# Patient Record
Sex: FEMALE | Race: WHITE | Employment: FULL TIME | URBAN - METROPOLITAN AREA
[De-identification: names, ages, dates, MRNs, and addresses within clinical notes are randomized per-mention and may not be internally consistent; named-entity substitution may affect disease eponyms.]

---

## 2017-05-04 DIAGNOSIS — E03.9 HYPOTHYROIDISM, UNSPECIFIED TYPE: Primary | ICD-10-CM

## 2017-05-04 RX ORDER — LEVOTHYROXINE SODIUM 0.12 MG/1
TABLET ORAL
Qty: 30 TABLET | Refills: 2 | Status: SHIPPED | OUTPATIENT
Start: 2017-05-04 | End: 2017-06-14 | Stop reason: ALTCHOICE

## 2017-06-14 ENCOUNTER — OFFICE VISIT (OUTPATIENT)
Dept: INTERNAL MEDICINE CLINIC | Facility: CLINIC | Age: 62
End: 2017-06-14

## 2017-06-14 VITALS
OXYGEN SATURATION: 96 % | BODY MASS INDEX: 37.11 KG/M2 | WEIGHT: 189 LBS | SYSTOLIC BLOOD PRESSURE: 124 MMHG | HEIGHT: 60 IN | RESPIRATION RATE: 17 BRPM | DIASTOLIC BLOOD PRESSURE: 86 MMHG | HEART RATE: 81 BPM

## 2017-06-14 DIAGNOSIS — Z72.0 TOBACCO USE: ICD-10-CM

## 2017-06-14 DIAGNOSIS — E03.9 HYPOTHYROIDISM, UNSPECIFIED TYPE: ICD-10-CM

## 2017-06-14 DIAGNOSIS — C50.911 MALIGNANT NEOPLASM OF RIGHT FEMALE BREAST, UNSPECIFIED ESTROGEN RECEPTOR STATUS, UNSPECIFIED SITE OF BREAST (HCC): Primary | ICD-10-CM

## 2017-06-14 DIAGNOSIS — R07.81 RIB PAIN ON RIGHT SIDE: ICD-10-CM

## 2017-06-14 DIAGNOSIS — G89.29 CHRONIC BILATERAL LOW BACK PAIN WITHOUT SCIATICA: ICD-10-CM

## 2017-06-14 DIAGNOSIS — E78.5 HYPERLIPIDEMIA, UNSPECIFIED HYPERLIPIDEMIA TYPE: ICD-10-CM

## 2017-06-14 DIAGNOSIS — M54.50 CHRONIC BILATERAL LOW BACK PAIN WITHOUT SCIATICA: ICD-10-CM

## 2017-06-14 PROCEDURE — 99214 OFFICE O/P EST MOD 30 MIN: CPT | Performed by: FAMILY MEDICINE

## 2017-06-14 PROCEDURE — 80061 LIPID PANEL: CPT | Performed by: FAMILY MEDICINE

## 2017-06-14 PROCEDURE — 81015 MICROSCOPIC EXAM OF URINE: CPT | Performed by: FAMILY MEDICINE

## 2017-06-14 PROCEDURE — 80050 GENERAL HEALTH PANEL: CPT | Performed by: FAMILY MEDICINE

## 2017-06-14 NOTE — PATIENT INSTRUCTIONS
Please make appt with oncology- Dr Gerber Adams or Dr Denisa Sethi [facility])    Suspect these pains are muscular and arthritis of lower spine, but want you to please start with xrays/ mammogram and then I might order CT chest    Please also make appt for full physical

## 2017-06-14 NOTE — PROGRESS NOTES
Yusuf Acosta is a 63year old female. CC:  Patient presents with:  Lump: Pt presents to clinic for c/o lump on right side of body near ribcage x 4wks. Low Back Pain: Pt also c/o ongoing low back pain->currently 3/10/      HPI:    1.  Lump R side ches artery stenosis      R , s/p radiation   • History of blood transfusion 2005   • Biliary calculus 1978     Removed gallbladder   • Cirrhosis of liver ([facility] [address] 2013   • Stroke [facility]) 2010 and 2014     TIA   • Disorder of liver    • Obesity    • Thyroid disease lesions  EXTREMITIES:  Bilaterally warm, no edema, no rashes  LYMPH:  No cervical lymphadenopathy  MUSCULOSKELETAL: No abnormalities on inspection. No spinal tenderness. +ttp over lumbar muscles on left and right. No tenderness over SI joint.  Negative str Future  - Comp Metabolic Panel (14) [E]  - Lipid Panel [E]    Pt to make appt for full PE and pap   There are no diagnoses linked to this encounter. No orders of the defined types were placed in this encounter.      NAKIA SCREENING BILAT (CPT=77067)

## 2017-06-19 NOTE — PROGRESS NOTES
Quick Note:    D/w pt  Liver enyzmes stable- hx cirrhosis s/p chemo  LDL not at goal- pt admits not taking statin regularly. Has hx TIA and carotid stenosis, LDL goal 70. Pt will take daily now and recheck in 2 mos.  Refill of atorvastatin 40 sent  ______

## 2017-07-18 ENCOUNTER — TELEPHONE (OUTPATIENT)
Dept: INTERNAL MEDICINE CLINIC | Facility: CLINIC | Age: 62
End: 2017-07-18

## 2017-07-19 ENCOUNTER — HOSPITAL ENCOUNTER (OUTPATIENT)
Dept: GENERAL RADIOLOGY | Age: 62
Discharge: HOME | End: 2017-07-19
Attending: FAMILY MEDICINE
Payer: COMMERCIAL

## 2017-07-19 DIAGNOSIS — R07.81 RIB PAIN ON RIGHT SIDE: ICD-10-CM

## 2017-07-19 DIAGNOSIS — M54.50 CHRONIC BILATERAL LOW BACK PAIN WITHOUT SCIATICA: ICD-10-CM

## 2017-07-19 DIAGNOSIS — G89.29 CHRONIC BILATERAL LOW BACK PAIN WITHOUT SCIATICA: ICD-10-CM

## 2017-07-19 PROCEDURE — 71101 X-RAY EXAM UNILAT RIBS/CHEST: CPT | Performed by: FAMILY MEDICINE

## 2017-07-19 PROCEDURE — 72110 X-RAY EXAM L-2 SPINE 4/>VWS: CPT | Performed by: FAMILY MEDICINE

## 2017-07-20 NOTE — PROGRESS NOTES
Pt also working on making appt with oncology. Pt also having various new pains- collar bone, low back, R side, neck. Asked to please make appt to go over new pains.  Would still recommend CT

## 2017-08-05 DIAGNOSIS — E03.9 HYPOTHYROIDISM, UNSPECIFIED TYPE: ICD-10-CM

## 2017-08-05 RX ORDER — LEVOTHYROXINE SODIUM 0.12 MG/1
TABLET ORAL
Qty: 30 TABLET | Refills: 2 | Status: SHIPPED | OUTPATIENT
Start: 2017-08-05 | End: 2017-11-02

## 2017-08-10 ENCOUNTER — TELEPHONE (OUTPATIENT)
Dept: HEMATOLOGY/ONCOLOGY | Facility: HOSPITAL | Age: 62
End: 2017-08-10

## 2017-08-17 ENCOUNTER — TELEPHONE (OUTPATIENT)
Dept: HEMATOLOGY/ONCOLOGY | Facility: HOSPITAL | Age: 62
End: 2017-08-17

## 2017-08-17 ENCOUNTER — TELEPHONE (OUTPATIENT)
Dept: INTERNAL MEDICINE CLINIC | Facility: CLINIC | Age: 62
End: 2017-08-17

## 2017-08-17 NOTE — TELEPHONE ENCOUNTER
Spoke to Pancho Rea at Dr. Will Frost' office and informed her that we have made multiple attempts (7/11 - initial intake with patient stating she would drop off records, 8/1, 8/10, 8/16) and left messages for the patient to schedule her consult with Dr. Tyler Gauthier.   We hav

## 2017-08-17 NOTE — TELEPHONE ENCOUNTER
The [facility] has been trying to reach the patient to set up an appt without success. They will no longer be trying to reach the patient.

## 2017-08-17 NOTE — TELEPHONE ENCOUNTER
Pt's  verified  \Called Pt and n/a l/m to reach out to Cancer center to schedule her appt with them and to call our office if she has any questions

## 2017-08-17 NOTE — TELEPHONE ENCOUNTER
Can you please call pt and tell her cancer center has been trying to reach her. I would really like her to get seen there so ask her to please call back and sched appt. Thank you!

## 2017-09-12 ENCOUNTER — TELEPHONE (OUTPATIENT)
Dept: HEMATOLOGY/ONCOLOGY | Facility: HOSPITAL | Age: 62
End: 2017-09-12

## 2017-09-12 NOTE — TELEPHONE ENCOUNTER
I attempted to reach the patient today. I left a voice mail message asking her to please call me back. I need to speak with her regarding her history of right breast cancer. I need specifics so we may obtain her records.  We will need her records/imaging be

## 2017-09-20 ENCOUNTER — TELEPHONE (OUTPATIENT)
Dept: HEMATOLOGY/ONCOLOGY | Facility: HOSPITAL | Age: 62
End: 2017-09-20

## 2017-09-20 NOTE — TELEPHONE ENCOUNTER
I attempted to reach [facility] regarding scheduling a consult appt with Dr Matt Glass. I left a  msg asking her to please call me back. I am holding an appt on 9/29/2017 at 1pm on Dr Santiago Seals schedule.

## 2017-09-21 ENCOUNTER — HOSPITAL ENCOUNTER (OUTPATIENT)
Dept: CT IMAGING | Facility: HOSPITAL | Age: 62
Discharge: HOME | End: 2017-09-21
Attending: FAMILY MEDICINE
Payer: COMMERCIAL

## 2017-09-21 ENCOUNTER — APPOINTMENT (OUTPATIENT)
Dept: MAMMOGRAPHY | Age: 62
End: 2017-09-21
Attending: FAMILY MEDICINE
Payer: COMMERCIAL

## 2017-09-21 DIAGNOSIS — M54.6 ACUTE RIGHT-SIDED THORACIC BACK PAIN: ICD-10-CM

## 2017-09-21 PROBLEM — K74.69 OTHER CIRRHOSIS OF LIVER (HCC): Status: ACTIVE | Noted: 2017-09-21

## 2017-09-21 LAB — CREAT BLD-MCNC: 0.7 MG/DL (ref 0.5–1.5)

## 2017-09-21 PROCEDURE — 74177 CT ABD & PELVIS W/CONTRAST: CPT | Performed by: FAMILY MEDICINE

## 2017-09-21 PROCEDURE — 82565 ASSAY OF CREATININE: CPT

## 2017-09-21 PROCEDURE — 71260 CT THORAX DX C+: CPT | Performed by: FAMILY MEDICINE

## 2017-09-22 NOTE — PROGRESS NOTES
Henrique Mccabe,    I just left you a voicemail. CT is overall stable with no signs of metastatic disease which is good. Please 1. Make appt with oncology as you should be seeing them yearly. 2. Please make appt at my office for fasting blood draw.  You are due

## 2017-09-28 ENCOUNTER — HOSPITAL ENCOUNTER (OUTPATIENT)
Dept: MAMMOGRAPHY | Age: 62
Discharge: HOME | End: 2017-09-28
Attending: FAMILY MEDICINE
Payer: COMMERCIAL

## 2017-09-28 DIAGNOSIS — C50.911 MALIGNANT NEOPLASM OF RIGHT FEMALE BREAST, UNSPECIFIED ESTROGEN RECEPTOR STATUS, UNSPECIFIED SITE OF BREAST (HCC): ICD-10-CM

## 2017-09-28 PROCEDURE — 77067 SCR MAMMO BI INCL CAD: CPT | Performed by: FAMILY MEDICINE

## 2017-10-12 PROBLEM — Z17.0 MALIGNANT NEOPLASM OF RIGHT BREAST IN FEMALE, ESTROGEN RECEPTOR POSITIVE  (HCC): Status: ACTIVE | Noted: 2017-10-12

## 2017-10-12 PROBLEM — Z17.0 MALIGNANT NEOPLASM OF RIGHT BREAST IN FEMALE, ESTROGEN RECEPTOR POSITIVE: Status: ACTIVE | Noted: 2017-10-12

## 2017-10-12 PROBLEM — C50.911 MALIGNANT NEOPLASM OF RIGHT BREAST IN FEMALE, ESTROGEN RECEPTOR POSITIVE (HCC): Status: ACTIVE | Noted: 2017-10-12

## 2017-10-12 PROBLEM — C50.911 MALIGNANT NEOPLASM OF RIGHT BREAST IN FEMALE, ESTROGEN RECEPTOR POSITIVE: Status: ACTIVE | Noted: 2017-10-12

## 2017-10-12 PROBLEM — Z17.0 MALIGNANT NEOPLASM OF RIGHT BREAST IN FEMALE, ESTROGEN RECEPTOR POSITIVE (HCC): Status: ACTIVE | Noted: 2017-10-12

## 2017-10-12 PROBLEM — C50.911 MALIGNANT NEOPLASM OF RIGHT BREAST IN FEMALE, ESTROGEN RECEPTOR POSITIVE  (HCC): Status: ACTIVE | Noted: 2017-10-12

## 2017-10-13 ENCOUNTER — OFFICE VISIT (OUTPATIENT)
Dept: HEMATOLOGY/ONCOLOGY | Facility: HOSPITAL | Age: 62
End: 2017-10-13
Attending: INTERNAL MEDICINE
Payer: COMMERCIAL

## 2017-10-13 VITALS
RESPIRATION RATE: 16 BRPM | HEIGHT: 60 IN | BODY MASS INDEX: 37.69 KG/M2 | DIASTOLIC BLOOD PRESSURE: 56 MMHG | SYSTOLIC BLOOD PRESSURE: 132 MMHG | WEIGHT: 192 LBS | TEMPERATURE: 98 F | HEART RATE: 91 BPM

## 2017-10-13 DIAGNOSIS — Z17.0 MALIGNANT NEOPLASM OF RIGHT BREAST IN FEMALE, ESTROGEN RECEPTOR POSITIVE, UNSPECIFIED SITE OF BREAST (HCC): Primary | ICD-10-CM

## 2017-10-13 DIAGNOSIS — C50.911 MALIGNANT NEOPLASM OF RIGHT BREAST IN FEMALE, ESTROGEN RECEPTOR POSITIVE, UNSPECIFIED SITE OF BREAST (HCC): Primary | ICD-10-CM

## 2017-10-13 DIAGNOSIS — Z90.11 STATUS POST RIGHT MASTECTOMY: ICD-10-CM

## 2017-10-13 DIAGNOSIS — Z12.31 SCREENING MAMMOGRAM, ENCOUNTER FOR: ICD-10-CM

## 2017-10-13 PROCEDURE — 99244 OFF/OP CNSLTJ NEW/EST MOD 40: CPT | Performed by: INTERNAL MEDICINE

## 2017-10-13 NOTE — PROGRESS NOTES
HPI     Ruthann So is a 63year old female here for establishing care for Malignant neoplasm of right breast in female, estrogen receptor positive, unspecified site of breast (hcc)  (primary encounter diagnosis)    OSH records reviewed and summarized Decreased vision, has not had eyes checked in a while. Respiratory: Positive for cough (due to smoking.) and shortness of breath (DEVINE with activity. ). Negative for chest tightness and wheezing.     Cardiovascular: Negative for chest pain, palpitation during a mastectomy.   Penicillins                 Past Medical History:   Diagnosis Date   • Biliary calculus 1978    Removed gallbladder   • Breast cancer ([facility] [address]    • Carotid artery stenosis     R , s/p radiation   • Cirrhosis of liver (HCC) 201 Left Ear: External ear normal.   Nose: Nose normal.   Mouth/Throat: Oropharynx is clear and moist.   Eyes: Conjunctivae and EOM are normal. Pupils are equal, round, and reactive to light. No scleral icterus. Neck: Normal range of motion. Neck supple.  No Yearly surveillance with L breast mammogram due end of Sept of 2018. Monthly SBE and chest wall inspection. Yearly CBE. Call if new symptoms or changes, will have imaging then. Diet and exercise and smoking cessation recommended.     Discussed w VASCULATURE:            Normal variant four-vessel configuration with the left vertebral artery arising directly from the arch is seen; no dissection is evident. Right common carotid artery stent is partially imaged.   LUNGS/PLEURA:           Peripheral ret PELVIC ORGANS:         No visible mass. Pelvic organs appropriate for patient age. Deep pelvic calcifications likely represent phleboliths. VASCULATURE:            No aneurysm is detected.  Heavy atherosclerotic vascular calcifications of the abdominal 9. Presumed postsurgical changes along the right lower quadrant abdominal wall with focal loss of subcutaneous soft tissue. 10. Predominately facet related degenerative changes involving the lower lumbar spine. 11. Lesser incidental findings as above.   P

## 2017-11-02 DIAGNOSIS — E03.9 HYPOTHYROIDISM, UNSPECIFIED TYPE: ICD-10-CM

## 2017-11-02 RX ORDER — LEVOTHYROXINE SODIUM 0.12 MG/1
TABLET ORAL
Qty: 30 TABLET | Refills: 2 | Status: SHIPPED | OUTPATIENT
Start: 2017-11-02 | End: 2018-02-01

## 2018-02-01 DIAGNOSIS — E03.9 HYPOTHYROIDISM, UNSPECIFIED TYPE: ICD-10-CM

## 2018-02-01 RX ORDER — LEVOTHYROXINE SODIUM 0.12 MG/1
TABLET ORAL
Qty: 30 TABLET | Refills: 2 | Status: SHIPPED | OUTPATIENT
Start: 2018-02-01 | End: 2018-05-08

## 2018-03-07 ENCOUNTER — TELEPHONE (OUTPATIENT)
Dept: HEMATOLOGY/ONCOLOGY | Facility: HOSPITAL | Age: 63
End: 2018-03-07

## 2018-03-07 NOTE — TELEPHONE ENCOUNTER
Edna Joaquin would like to speak with the nurse. She stated she has discovered a new lump which is sore and tender to the touch. She is concerned about the lump and is seeking further instructions before she schedules an appointment to see Dr Steph Reyna.  Please Advise

## 2018-03-07 NOTE — TELEPHONE ENCOUNTER
Returned phone call. Pt states \" I am nervous because I now have a pebble sized area under my left armpit and it is very tender to touch. I am concerned because this is how felt and started when I was diagnosed with right breast cancer. \" Emotional suppor

## 2018-03-08 ENCOUNTER — OFFICE VISIT (OUTPATIENT)
Dept: HEMATOLOGY/ONCOLOGY | Facility: HOSPITAL | Age: 63
End: 2018-03-08
Attending: INTERNAL MEDICINE
Payer: COMMERCIAL

## 2018-03-08 VITALS
DIASTOLIC BLOOD PRESSURE: 64 MMHG | HEART RATE: 99 BPM | HEIGHT: 60 IN | RESPIRATION RATE: 16 BRPM | BODY MASS INDEX: 37.89 KG/M2 | TEMPERATURE: 99 F | SYSTOLIC BLOOD PRESSURE: 145 MMHG | WEIGHT: 193 LBS

## 2018-03-08 DIAGNOSIS — R22.32 AXILLARY MASS, LEFT: Primary | ICD-10-CM

## 2018-03-08 DIAGNOSIS — C50.411 MALIGNANT NEOPLASM OF UPPER-OUTER QUADRANT OF RIGHT BREAST IN FEMALE, ESTROGEN RECEPTOR POSITIVE (HCC): ICD-10-CM

## 2018-03-08 DIAGNOSIS — Z17.0 MALIGNANT NEOPLASM OF UPPER-OUTER QUADRANT OF RIGHT BREAST IN FEMALE, ESTROGEN RECEPTOR POSITIVE (HCC): ICD-10-CM

## 2018-03-08 PROCEDURE — 99213 OFFICE O/P EST LOW 20 MIN: CPT | Performed by: PHYSICIAN ASSISTANT

## 2018-03-08 NOTE — PATIENT INSTRUCTIONS
1.  Schedule left breast ultrasound by calling [phone]    2. Follow-up appt (after US) in approx 2 weeks    3.   Call as needed

## 2018-03-08 NOTE — PROGRESS NOTES
S:  63year old female reports palpating a left axillary mass x 8 weeks. As time passes, she doesn't notice that the mass is enlarging, but notes the area is more tender. She denies new trauma, change in razor or change in topical tolietries.       Uzair

## 2018-03-09 PROBLEM — R22.32 AXILLARY MASS, LEFT: Status: ACTIVE | Noted: 2018-03-09

## 2018-03-13 ENCOUNTER — HOSPITAL ENCOUNTER (OUTPATIENT)
Dept: MAMMOGRAPHY | Facility: HOSPITAL | Age: 63
Discharge: HOME | End: 2018-03-13
Attending: PHYSICIAN ASSISTANT
Payer: COMMERCIAL

## 2018-03-13 ENCOUNTER — HOSPITAL ENCOUNTER (OUTPATIENT)
Dept: ULTRASOUND IMAGING | Facility: HOSPITAL | Age: 63
Discharge: HOME | End: 2018-03-13
Attending: PHYSICIAN ASSISTANT
Payer: COMMERCIAL

## 2018-03-13 DIAGNOSIS — C50.911 MALIGNANT NEOPLASM OF RIGHT BREAST IN FEMALE, ESTROGEN RECEPTOR POSITIVE, UNSPECIFIED SITE OF BREAST (HCC): Primary | ICD-10-CM

## 2018-03-13 DIAGNOSIS — Z17.0 MALIGNANT NEOPLASM OF RIGHT BREAST IN FEMALE, ESTROGEN RECEPTOR POSITIVE, UNSPECIFIED SITE OF BREAST (HCC): ICD-10-CM

## 2018-03-13 DIAGNOSIS — Z17.0 MALIGNANT NEOPLASM OF RIGHT BREAST IN FEMALE, ESTROGEN RECEPTOR POSITIVE, UNSPECIFIED SITE OF BREAST (HCC): Primary | ICD-10-CM

## 2018-03-13 DIAGNOSIS — C50.911 MALIGNANT NEOPLASM OF RIGHT BREAST IN FEMALE, ESTROGEN RECEPTOR POSITIVE, UNSPECIFIED SITE OF BREAST (HCC): ICD-10-CM

## 2018-03-13 DIAGNOSIS — R22.32 AXILLARY MASS, LEFT: ICD-10-CM

## 2018-03-13 PROCEDURE — 77065 DX MAMMO INCL CAD UNI: CPT | Performed by: PHYSICIAN ASSISTANT

## 2018-03-13 PROCEDURE — 76642 ULTRASOUND BREAST LIMITED: CPT | Performed by: PHYSICIAN ASSISTANT

## 2018-05-07 ENCOUNTER — TELEPHONE (OUTPATIENT)
Dept: INTERNAL MEDICINE CLINIC | Facility: CLINIC | Age: 63
End: 2018-05-07

## 2018-05-07 DIAGNOSIS — Z00.00 PHYSICAL EXAM: Primary | ICD-10-CM

## 2018-05-07 RX ORDER — ACETAMINOPHEN AND CODEINE PHOSPHATE 300; 30 MG/1; MG/1
1-2 TABLET ORAL EVERY 6 HOURS PRN
Qty: 30 TABLET | Refills: 0 | Status: SHIPPED | OUTPATIENT
Start: 2018-05-07 | End: 2018-10-31

## 2018-05-07 NOTE — TELEPHONE ENCOUNTER
Patient states she is due for labwork, wants everything ordered inclding thyroid, please advise, she wants to come tomorrow.

## 2018-05-08 DIAGNOSIS — E03.9 HYPOTHYROIDISM, UNSPECIFIED TYPE: ICD-10-CM

## 2018-05-08 RX ORDER — LEVOTHYROXINE SODIUM 0.12 MG/1
TABLET ORAL
Qty: 90 TABLET | Refills: 1 | Status: SHIPPED | OUTPATIENT
Start: 2018-05-08 | End: 2018-10-31

## 2018-05-09 ENCOUNTER — NURSE ONLY (OUTPATIENT)
Dept: INTERNAL MEDICINE CLINIC | Facility: CLINIC | Age: 63
End: 2018-05-09

## 2018-05-09 DIAGNOSIS — Z00.00 PHYSICAL EXAM: ICD-10-CM

## 2018-05-09 PROCEDURE — 80050 GENERAL HEALTH PANEL: CPT | Performed by: FAMILY MEDICINE

## 2018-05-09 PROCEDURE — 82105 ALPHA-FETOPROTEIN SERUM: CPT | Performed by: FAMILY MEDICINE

## 2018-05-09 PROCEDURE — 80061 LIPID PANEL: CPT | Performed by: FAMILY MEDICINE

## 2018-05-09 PROCEDURE — 36415 COLL VENOUS BLD VENIPUNCTURE: CPT | Performed by: FAMILY MEDICINE

## 2018-05-14 PROBLEM — D69.6 THROMBOCYTOPENIA (HCC): Status: ACTIVE | Noted: 2018-05-14

## 2018-07-02 ENCOUNTER — TELEPHONE (OUTPATIENT)
Dept: GASTROENTEROLOGY | Facility: CLINIC | Age: 63
End: 2018-07-02

## 2018-07-02 NOTE — TELEPHONE ENCOUNTER
----- Message from Iliana Ambrocio RN sent at 1/4/2016  1:24 PM CST -----  Regarding: colonoscopy recall  Colonoscopy recall for Aug 2018

## 2018-10-31 ENCOUNTER — OFFICE VISIT (OUTPATIENT)
Dept: INTERNAL MEDICINE CLINIC | Facility: CLINIC | Age: 63
End: 2018-10-31
Payer: COMMERCIAL

## 2018-10-31 VITALS
SYSTOLIC BLOOD PRESSURE: 140 MMHG | BODY MASS INDEX: 36.71 KG/M2 | DIASTOLIC BLOOD PRESSURE: 60 MMHG | HEIGHT: 60 IN | OXYGEN SATURATION: 98 % | WEIGHT: 187 LBS | HEART RATE: 90 BPM

## 2018-10-31 DIAGNOSIS — H92.02 EAR PAIN, LEFT: ICD-10-CM

## 2018-10-31 DIAGNOSIS — Z12.11 SCREENING FOR COLON CANCER: ICD-10-CM

## 2018-10-31 DIAGNOSIS — Z00.00 PHYSICAL EXAM: ICD-10-CM

## 2018-10-31 DIAGNOSIS — D69.6 THROMBOCYTOPENIA (HCC): ICD-10-CM

## 2018-10-31 DIAGNOSIS — R03.0 ELEVATED BLOOD PRESSURE READING: ICD-10-CM

## 2018-10-31 DIAGNOSIS — E78.5 HYPERLIPIDEMIA, UNSPECIFIED HYPERLIPIDEMIA TYPE: Primary | ICD-10-CM

## 2018-10-31 DIAGNOSIS — E03.9 HYPOTHYROIDISM, UNSPECIFIED TYPE: ICD-10-CM

## 2018-10-31 PROCEDURE — 80061 LIPID PANEL: CPT | Performed by: FAMILY MEDICINE

## 2018-10-31 PROCEDURE — 80050 GENERAL HEALTH PANEL: CPT | Performed by: FAMILY MEDICINE

## 2018-10-31 PROCEDURE — 99214 OFFICE O/P EST MOD 30 MIN: CPT | Performed by: FAMILY MEDICINE

## 2018-10-31 RX ORDER — ATORVASTATIN CALCIUM 40 MG/1
40 TABLET, FILM COATED ORAL NIGHTLY
Qty: 90 TABLET | Refills: 3 | Status: SHIPPED | OUTPATIENT
Start: 2018-10-31 | End: 2019-08-15

## 2018-10-31 RX ORDER — LEVOTHYROXINE SODIUM 0.12 MG/1
TABLET ORAL
Qty: 90 TABLET | Refills: 0 | Status: SHIPPED | OUTPATIENT
Start: 2018-10-31 | End: 2019-02-05

## 2018-10-31 RX ORDER — NAPROXEN 500 MG/1
500 TABLET ORAL 2 TIMES DAILY PRN
Qty: 20 TABLET | Refills: 0 | Status: SHIPPED | OUTPATIENT
Start: 2018-10-31 | End: 2018-11-06 | Stop reason: ALTCHOICE

## 2018-10-31 RX ORDER — CEFDINIR 300 MG/1
300 CAPSULE ORAL 2 TIMES DAILY
Qty: 20 CAPSULE | Refills: 0 | Status: SHIPPED | OUTPATIENT
Start: 2018-10-31 | End: 2019-08-15 | Stop reason: ALTCHOICE

## 2018-10-31 NOTE — PROGRESS NOTES
Franky Valencia is a 66year old female.     CC:  Patient presents with:  Ear Problem: c/o bump in left ear and painful behind the ear x4wks      HPI:    Lump inside  Left but hurts behind left ear x 4 weeks  Ear feels clogged but not painful   When presses (transient ischemic attack)       Past Surgical History:   Procedure Laterality Date   • BREAST RECONSTRUCTION  2005    R Side, Multiple Procedures    • CHEMOTHERAPY     • CHOLECYSTECTOMY  1978   • MASTECTOMY RIGHT     • OTHER      masectomy   • RADIATION bilaterally  PSYCH:  Alert and oriented x 3, affect appropriate  SKIN: No rashes, no lesions  EXTREMITIES:  Bilaterally warm, no edema, no rashes  LYMPH:  No cervical lymphadenopathy  MUSCULOSKELETAL: Normal ambulation and movement  NEURO: A & O x 3, no fo

## 2018-11-06 ENCOUNTER — OFFICE VISIT (OUTPATIENT)
Dept: HEMATOLOGY/ONCOLOGY | Facility: HOSPITAL | Age: 63
End: 2018-11-06
Attending: INTERNAL MEDICINE
Payer: COMMERCIAL

## 2018-11-06 VITALS
HEART RATE: 81 BPM | RESPIRATION RATE: 18 BRPM | BODY MASS INDEX: 36.91 KG/M2 | HEIGHT: 60 IN | SYSTOLIC BLOOD PRESSURE: 146 MMHG | WEIGHT: 188 LBS | TEMPERATURE: 98 F | DIASTOLIC BLOOD PRESSURE: 61 MMHG

## 2018-11-06 DIAGNOSIS — D69.6 THROMBOCYTOPENIA (HCC): ICD-10-CM

## 2018-11-06 DIAGNOSIS — Z12.31 SCREENING MAMMOGRAM, ENCOUNTER FOR: ICD-10-CM

## 2018-11-06 DIAGNOSIS — R16.1 SPLENOMEGALY: ICD-10-CM

## 2018-11-06 DIAGNOSIS — Z90.11 STATUS POST RIGHT MASTECTOMY: ICD-10-CM

## 2018-11-06 DIAGNOSIS — Z17.0 MALIGNANT NEOPLASM OF RIGHT BREAST IN FEMALE, ESTROGEN RECEPTOR POSITIVE, UNSPECIFIED SITE OF BREAST (HCC): Primary | ICD-10-CM

## 2018-11-06 DIAGNOSIS — C50.911 MALIGNANT NEOPLASM OF RIGHT BREAST IN FEMALE, ESTROGEN RECEPTOR POSITIVE, UNSPECIFIED SITE OF BREAST (HCC): Primary | ICD-10-CM

## 2018-11-06 PROCEDURE — 99214 OFFICE O/P EST MOD 30 MIN: CPT | Performed by: INTERNAL MEDICINE

## 2018-11-06 RX ORDER — ASPIRIN 325 MG
325 TABLET ORAL DAILY
COMMUNITY

## 2018-11-06 NOTE — PROGRESS NOTES
JERRY Rajan is a 66year old female here for Malignant neoplasm of right breast in female, estrogen receptor positive, unspecified site of breast (hcc)  (primary encounter diagnosis)  Screening mammogram, encounter for  Status post right mas Misc. Devices [facility]) Does not apply Misc Please provide Four (4) Bras to patient per year Disp: 4 each Rfl: 0     Allergies:     Blue Dye                ANAPHYLAXIS    Comment:Patient reports not IV dye used during CT             angiogram/Cerebr Alcohol use: No        Alcohol/week: 0.0 oz      Drug use: No      Sexual activity: Not on file    Other Topics      Concerns:         Service: Not Asked        Blood Transfusions: Not Asked        Caffeine Concern: Yes          Coffee        O R chest mastectomy with skin graft. No skin nodules. Abdominal: Soft. Bowel sounds are normal. She exhibits no distension and no mass. There is no hepatosplenomegaly. There is tenderness (LUQ). There is no rebound and no guarding.    Musculoskeletal: Sh Thrombocytopenia: Present since at least 2016. No other cytopenias. Elevated MPV. Discussed with the patient that likely secondary to ITP. Recommend to have follow-up with CBC every 6 months. Patient instructed to call if any unusual bleeding.   She al A radiopaque triangle marker locates the palpable abnormality in the left axilla. A single compression view in the MLO plane could be obtained. The skin marker overlies an incompletely visualized 3.5 cm fatty replaced lymph node.  This lymph no 80.0 - 100.0 fL 90.1 90.0 90.9 91.8   MCH      27.0 - 32.0 pg 31.0 30.0 30.1 30.2   MCHC      32.0 - 37.0 g/dl 34.4 33.4 33.2 32.9   RDW      11.0 - 15.0 % 13.5 13.5 13.4    Platelet Count      845 - 400 K/ (L) 90 (L) 122 (L) 130 (L)   MEAN PLATE

## 2018-11-15 ENCOUNTER — OFFICE VISIT (OUTPATIENT)
Dept: AUDIOLOGY | Facility: CLINIC | Age: 63
End: 2018-11-15
Payer: COMMERCIAL

## 2018-11-15 ENCOUNTER — OFFICE VISIT (OUTPATIENT)
Dept: OTOLARYNGOLOGY | Facility: CLINIC | Age: 63
End: 2018-11-15
Payer: COMMERCIAL

## 2018-11-15 VITALS
WEIGHT: 188 LBS | BODY MASS INDEX: 36.91 KG/M2 | DIASTOLIC BLOOD PRESSURE: 90 MMHG | TEMPERATURE: 97 F | HEIGHT: 60 IN | SYSTOLIC BLOOD PRESSURE: 130 MMHG

## 2018-11-15 DIAGNOSIS — H90.3 SENSORINEURAL HEARING LOSS, BILATERAL: ICD-10-CM

## 2018-11-15 DIAGNOSIS — R42 DIZZINESS: Primary | ICD-10-CM

## 2018-11-15 DIAGNOSIS — R42 DIZZINESS: ICD-10-CM

## 2018-11-15 PROCEDURE — 99243 OFF/OP CNSLTJ NEW/EST LOW 30: CPT | Performed by: OTOLARYNGOLOGY

## 2018-11-15 PROCEDURE — 92557 COMPREHENSIVE HEARING TEST: CPT | Performed by: AUDIOLOGIST

## 2018-11-15 PROCEDURE — 99212 OFFICE O/P EST SF 10 MIN: CPT | Performed by: OTOLARYNGOLOGY

## 2018-11-15 PROCEDURE — 92567 TYMPANOMETRY: CPT | Performed by: AUDIOLOGIST

## 2018-11-15 RX ORDER — MELOXICAM 15 MG/1
15 TABLET ORAL DAILY
Qty: 30 TABLET | Refills: 3 | Status: SHIPPED | OUTPATIENT
Start: 2018-11-15 | End: 2019-08-15 | Stop reason: ALTCHOICE

## 2018-11-15 NOTE — PROGRESS NOTES
Liudmila Haywood is a 66year old female.   Patient presents with:  Ear Problem: left ear pain for for 4 weeks  Dizziness: on and off dizziness since august      HISTORY OF PRESENT ILLNESS  She presents with a history of left ear pain for the past 4 weeks wit Blood Transfusions: Not Asked        Caffeine Concern: Yes          Coffee        Occupational Exposure: Not Asked        Hobby Hazards: Not Asked        Sleep Concern: Not Asked        Stress Concern: Not Asked        Weight Concern: Not Asked        Sp pigment change and rash. Hema/Lymph Negative Easy bleeding and easy bruising.            PHYSICAL EXAM    /90   Temp 97.4 °F (36.3 °C) (Tympanic)   Ht 5' (1.524 m)   Wt 188 lb (85.3 kg)   BMI 36.72 kg/m²        Constitutional Normal Overall appearan Misc, Please provide Four (4) Bras to patient per year, Disp: 4 each, Rfl: 0  •  cefdinir 300 MG Oral Cap, Take 1 capsule (300 mg total) by mouth 2 (two) times daily. , Disp: 20 capsule, Rfl: 0  ASSESSMENT AND PLAN    1.  Dizziness  Left ear pain appears to

## 2018-11-15 NOTE — PROGRESS NOTES
AUDIOGRAM     Becky Palomino was referred for testing by Stanford Michel for dizziness. 9/12/1955  [id number]      Otoscopic inspection: right ear no cerumen; left ear no cerumen.        Tests/Procedures  Patient was tested via  standard insert ea

## 2018-11-29 ENCOUNTER — HOSPITAL ENCOUNTER (OUTPATIENT)
Dept: ULTRASOUND IMAGING | Age: 63
Discharge: HOME | End: 2018-11-29
Attending: INTERNAL MEDICINE
Payer: COMMERCIAL

## 2018-11-29 DIAGNOSIS — R16.1 SPLENOMEGALY: ICD-10-CM

## 2018-11-29 PROCEDURE — 76705 ECHO EXAM OF ABDOMEN: CPT | Performed by: INTERNAL MEDICINE

## 2018-12-04 ENCOUNTER — TELEPHONE (OUTPATIENT)
Dept: HEMATOLOGY/ONCOLOGY | Facility: HOSPITAL | Age: 63
End: 2018-12-04

## 2018-12-04 NOTE — TELEPHONE ENCOUNTER
Returned phone call per Dr. Araceli Darby request and left message that ultrasound showed spleen normal in size. Instructed to call [phone] if has further questions or concerns.

## 2018-12-04 NOTE — TELEPHONE ENCOUNTER
Please let Salina Michelle know that the Ultrasound of the abdomen showed that the spleen is normal in size.

## 2019-02-05 DIAGNOSIS — E03.9 HYPOTHYROIDISM, UNSPECIFIED TYPE: ICD-10-CM

## 2019-02-05 RX ORDER — LEVOTHYROXINE SODIUM 0.12 MG/1
TABLET ORAL
Qty: 90 TABLET | Refills: 0 | Status: SHIPPED | OUTPATIENT
Start: 2019-02-05 | End: 2019-05-08

## 2019-05-08 DIAGNOSIS — E03.9 HYPOTHYROIDISM, UNSPECIFIED TYPE: ICD-10-CM

## 2019-05-08 RX ORDER — LEVOTHYROXINE SODIUM 0.12 MG/1
TABLET ORAL
Qty: 90 TABLET | Refills: 0 | Status: SHIPPED | OUTPATIENT
Start: 2019-05-08 | End: 2019-08-06

## 2019-08-06 DIAGNOSIS — E03.9 HYPOTHYROIDISM, UNSPECIFIED TYPE: ICD-10-CM

## 2019-08-06 RX ORDER — LEVOTHYROXINE SODIUM 0.12 MG/1
TABLET ORAL
Qty: 90 TABLET | Refills: 0 | Status: SHIPPED | OUTPATIENT
Start: 2019-08-06 | End: 2019-11-07

## 2019-08-15 ENCOUNTER — OFFICE VISIT (OUTPATIENT)
Dept: INTERNAL MEDICINE CLINIC | Facility: CLINIC | Age: 64
End: 2019-08-15
Payer: COMMERCIAL

## 2019-08-15 VITALS
DIASTOLIC BLOOD PRESSURE: 78 MMHG | BODY MASS INDEX: 37.89 KG/M2 | WEIGHT: 193 LBS | TEMPERATURE: 98 F | HEART RATE: 90 BPM | HEIGHT: 60 IN | OXYGEN SATURATION: 99 % | SYSTOLIC BLOOD PRESSURE: 128 MMHG

## 2019-08-15 DIAGNOSIS — R07.9 CHEST PAIN, UNSPECIFIED TYPE: ICD-10-CM

## 2019-08-15 DIAGNOSIS — R53.83 OTHER FATIGUE: ICD-10-CM

## 2019-08-15 DIAGNOSIS — R06.02 SHORTNESS OF BREATH: ICD-10-CM

## 2019-08-15 DIAGNOSIS — E78.5 HYPERLIPIDEMIA, UNSPECIFIED HYPERLIPIDEMIA TYPE: ICD-10-CM

## 2019-08-15 DIAGNOSIS — M25.551 RIGHT HIP PAIN: ICD-10-CM

## 2019-08-15 DIAGNOSIS — M54.50 LOW BACK PAIN, UNSPECIFIED BACK PAIN LATERALITY, UNSPECIFIED CHRONICITY, UNSPECIFIED WHETHER SCIATICA PRESENT: Primary | ICD-10-CM

## 2019-08-15 LAB
ALBUMIN SERPL-MCNC: 3.9 G/DL (ref 3.4–5)
ALBUMIN/GLOB SERPL: 1 {RATIO} (ref 1–2)
ALP LIVER SERPL-CCNC: 176 U/L (ref 50–130)
ALT SERPL-CCNC: 60 U/L (ref 13–56)
ANION GAP SERPL CALC-SCNC: 7 MMOL/L (ref 0–18)
AST SERPL-CCNC: 55 U/L (ref 15–37)
BASOPHILS # BLD AUTO: 0.04 X10(3) UL (ref 0–0.2)
BASOPHILS NFR BLD AUTO: 0.7 %
BILIRUB SERPL-MCNC: 0.5 MG/DL (ref 0.1–2)
BLOOD URINE: NEGATIVE
BUN BLD-MCNC: 19 MG/DL (ref 7–18)
BUN/CREAT SERPL: 29.2 (ref 10–20)
CALCIUM BLD-MCNC: 9.4 MG/DL (ref 8.5–10.1)
CHLORIDE SERPL-SCNC: 109 MMOL/L (ref 98–112)
CHOLEST SMN-MCNC: 203 MG/DL (ref ?–200)
CO2 SERPL-SCNC: 25 MMOL/L (ref 21–32)
CONTROL RUN WITHIN 24 HOURS?: YES
CREAT BLD-MCNC: 0.65 MG/DL (ref 0.55–1.02)
DEPRECATED RDW RBC AUTO: 44.5 FL (ref 35.1–46.3)
EOSINOPHIL # BLD AUTO: 0.21 X10(3) UL (ref 0–0.7)
EOSINOPHIL NFR BLD AUTO: 3.7 %
ERYTHROCYTE [DISTWIDTH] IN BLOOD BY AUTOMATED COUNT: 13.1 % (ref 11–15)
EST. AVERAGE GLUCOSE BLD GHB EST-MCNC: 103 MG/DL (ref 68–126)
GLOBULIN PLAS-MCNC: 4 G/DL (ref 2.8–4.4)
GLUCOSE BLD-MCNC: 99 MG/DL (ref 70–99)
GLUCOSE URINE: NEGATIVE
HBA1C MFR BLD HPLC: 5.2 % (ref ?–5.7)
HCT VFR BLD AUTO: 46.7 % (ref 35–48)
HDLC SERPL-MCNC: 57 MG/DL (ref 40–59)
HGB BLD-MCNC: 15.5 G/DL (ref 12–16)
IMM GRANULOCYTES # BLD AUTO: 0.02 X10(3) UL (ref 0–1)
IMM GRANULOCYTES NFR BLD: 0.4 %
LDLC SERPL CALC-MCNC: 129 MG/DL (ref ?–100)
LYMPHOCYTES # BLD AUTO: 0.75 X10(3) UL (ref 1–4)
LYMPHOCYTES NFR BLD AUTO: 13.4 %
M PROTEIN MFR SERPL ELPH: 7.9 G/DL (ref 6.4–8.2)
MCH RBC QN AUTO: 30.5 PG (ref 26–34)
MCHC RBC AUTO-ENTMCNC: 33.2 G/DL (ref 31–37)
MCV RBC AUTO: 91.9 FL (ref 80–100)
MONOCYTES # BLD AUTO: 0.46 X10(3) UL (ref 0.1–1)
MONOCYTES NFR BLD AUTO: 8.2 %
NEUTROPHILS # BLD AUTO: 4.13 X10 (3) UL (ref 1.5–7.7)
NEUTROPHILS # BLD AUTO: 4.13 X10(3) UL (ref 1.5–7.7)
NEUTROPHILS NFR BLD AUTO: 73.6 %
NITRITE URINE: NEGATIVE
NONHDLC SERPL-MCNC: 146 MG/DL (ref ?–130)
OSMOLALITY SERPL CALC.SUM OF ELEC: 294 MOSM/KG (ref 275–295)
PATIENT FASTING: YES
PATIENT FASTING: YES
PH URINE: 5 (ref 5–8)
PLATELET # BLD AUTO: 105 10(3)UL (ref 150–450)
POTASSIUM SERPL-SCNC: 4 MMOL/L (ref 3.5–5.1)
RBC # BLD AUTO: 5.08 X10(6)UL (ref 3.8–5.3)
SODIUM SERPL-SCNC: 141 MMOL/L (ref 136–145)
SPEC GRAVITY: 1.03 (ref 1–1.03)
TRIGL SERPL-MCNC: 84 MG/DL (ref 30–149)
TSI SER-ACNC: 0.91 MIU/ML (ref 0.36–3.74)
UROBILINOGEN URINE: 0.2
VLDLC SERPL CALC-MCNC: 17 MG/DL (ref 0–30)
WBC # BLD AUTO: 5.6 X10(3) UL (ref 4–11)

## 2019-08-15 PROCEDURE — 80050 GENERAL HEALTH PANEL: CPT | Performed by: FAMILY MEDICINE

## 2019-08-15 PROCEDURE — 83036 HEMOGLOBIN GLYCOSYLATED A1C: CPT | Performed by: FAMILY MEDICINE

## 2019-08-15 PROCEDURE — 36415 COLL VENOUS BLD VENIPUNCTURE: CPT | Performed by: FAMILY MEDICINE

## 2019-08-15 PROCEDURE — 80061 LIPID PANEL: CPT | Performed by: FAMILY MEDICINE

## 2019-08-15 PROCEDURE — 93000 ELECTROCARDIOGRAM COMPLETE: CPT | Performed by: FAMILY MEDICINE

## 2019-08-15 PROCEDURE — 87086 URINE CULTURE/COLONY COUNT: CPT | Performed by: FAMILY MEDICINE

## 2019-08-15 PROCEDURE — 99214 OFFICE O/P EST MOD 30 MIN: CPT | Performed by: FAMILY MEDICINE

## 2019-08-15 RX ORDER — ATORVASTATIN CALCIUM 40 MG/1
40 TABLET, FILM COATED ORAL NIGHTLY
Qty: 90 TABLET | Refills: 3 | Status: SHIPPED | OUTPATIENT
Start: 2019-08-15 | End: 2021-06-10

## 2019-08-15 NOTE — PATIENT INSTRUCTIONS
TO ER IF CHEST PAIN, SHORT OF BREATH OR FEELING WORSE    Pt tolerated blood draw well in left arm.  CV

## 2019-08-15 NOTE — PROGRESS NOTES
Elaina Benitez is a 66year old female.     CC:  Patient presents with:  Back Pain: patient presents for low back pain/ at times into shoulders-  Fever: fever off & on x 4 wks      HPI:  Feels like has fever but doesn't x 4 weeks  Different than hot flashes [facility]) 2013   • Disorder of liver    • History of blood transfusion 2005   • Obesity    • Other and unspecified hyperlipidemia    • S/P TRAM (transverse rectus abdominis muscle) flap breast reconstruction    • Stroke ([facility] [address] 2010 and 2014    TIA   • Thyroid d and lids normal  HENT: Moist oral mucosa, normal teeth  NECK: No lymphadenopathy or masses  CAR:  RRR, no murmurs, S1 and S2 normal  PULM: Clear to auscultation bilaterally  GI: Soft, non-tender, no rebound, no guarding.  Surgical scars  PSYCH:  Alert and o DIFFERENTIAL WITH PLATELET  - TSH W REFLEX TO FREE T4  - HEMOGLOBIN A1C  - CBC W/ DIFFERENTIAL  - XR LUMBAR SPINE (MIN 4 VIEWS) (CPT=72110); Future    5.  Hyperlipidemia, unspecified hyperlipidemia type  Needs to restart statin    - atorvastatin 40 MG Oral

## 2019-09-03 ENCOUNTER — HOSPITAL ENCOUNTER (OUTPATIENT)
Dept: GENERAL RADIOLOGY | Age: 64
Discharge: HOME | End: 2019-09-03
Attending: FAMILY MEDICINE
Payer: COMMERCIAL

## 2019-09-03 ENCOUNTER — HOSPITAL ENCOUNTER (OUTPATIENT)
Dept: MAMMOGRAPHY | Age: 64
Discharge: HOME | End: 2019-09-03
Attending: INTERNAL MEDICINE
Payer: COMMERCIAL

## 2019-09-03 DIAGNOSIS — Z90.11 STATUS POST RIGHT MASTECTOMY: ICD-10-CM

## 2019-09-03 DIAGNOSIS — Z12.31 SCREENING MAMMOGRAM, ENCOUNTER FOR: ICD-10-CM

## 2019-09-03 DIAGNOSIS — M25.551 RIGHT HIP PAIN: ICD-10-CM

## 2019-09-03 DIAGNOSIS — M54.50 LOW BACK PAIN, UNSPECIFIED BACK PAIN LATERALITY, UNSPECIFIED CHRONICITY, UNSPECIFIED WHETHER SCIATICA PRESENT: ICD-10-CM

## 2019-09-03 DIAGNOSIS — R53.83 OTHER FATIGUE: ICD-10-CM

## 2019-09-03 PROCEDURE — 77063 BREAST TOMOSYNTHESIS BI: CPT | Performed by: INTERNAL MEDICINE

## 2019-09-03 PROCEDURE — 73502 X-RAY EXAM HIP UNI 2-3 VIEWS: CPT | Performed by: FAMILY MEDICINE

## 2019-09-03 PROCEDURE — 72110 X-RAY EXAM L-2 SPINE 4/>VWS: CPT | Performed by: FAMILY MEDICINE

## 2019-09-03 PROCEDURE — 77067 SCR MAMMO BI INCL CAD: CPT | Performed by: INTERNAL MEDICINE

## 2019-09-03 PROCEDURE — 71046 X-RAY EXAM CHEST 2 VIEWS: CPT | Performed by: FAMILY MEDICINE

## 2019-11-07 DIAGNOSIS — E03.9 HYPOTHYROIDISM, UNSPECIFIED TYPE: ICD-10-CM

## 2019-11-07 RX ORDER — LEVOTHYROXINE SODIUM 125 UG/1
TABLET ORAL
Qty: 90 TABLET | Refills: 0 | Status: SHIPPED | OUTPATIENT
Start: 2019-11-07 | End: 2020-01-30

## 2020-01-30 DIAGNOSIS — E03.9 HYPOTHYROIDISM, UNSPECIFIED TYPE: ICD-10-CM

## 2020-01-30 RX ORDER — LEVOTHYROXINE SODIUM 125 UG/1
TABLET ORAL
Qty: 90 TABLET | Refills: 0 | Status: SHIPPED | OUTPATIENT
Start: 2020-01-30 | End: 2020-04-28

## 2020-03-28 ENCOUNTER — TELEPHONE (OUTPATIENT)
Dept: FAMILY MEDICINE CLINIC | Facility: CLINIC | Age: 65
End: 2020-03-28

## 2020-03-28 DIAGNOSIS — R07.81 RIB PAIN ON RIGHT SIDE: Primary | ICD-10-CM

## 2020-03-28 PROCEDURE — 99213 OFFICE O/P EST LOW 20 MIN: CPT | Performed by: FAMILY MEDICINE

## 2020-03-28 NOTE — TELEPHONE ENCOUNTER
Virtual/Telephone Check-In    Ruben Cole verbally consents to a Virtual/Telephone Check-In service on 03/28/20. Patient understands and accepts financial responsibility for any deductible, co-insurance and/or co-pays associated with this service.     D

## 2020-03-28 NOTE — TELEPHONE ENCOUNTER
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  Per patient she has left side rib cage pain that started Eng

## 2020-04-28 DIAGNOSIS — E03.9 HYPOTHYROIDISM, UNSPECIFIED TYPE: ICD-10-CM

## 2020-04-28 RX ORDER — LEVOTHYROXINE SODIUM 125 UG/1
TABLET ORAL
Qty: 90 TABLET | Refills: 0 | Status: SHIPPED | OUTPATIENT
Start: 2020-04-28 | End: 2020-07-29

## 2020-07-29 DIAGNOSIS — E03.9 HYPOTHYROIDISM, UNSPECIFIED TYPE: ICD-10-CM

## 2020-07-29 RX ORDER — LEVOTHYROXINE SODIUM 125 UG/1
TABLET ORAL
Qty: 90 TABLET | Refills: 0 | Status: SHIPPED | OUTPATIENT
Start: 2020-07-29 | End: 2020-10-27

## 2020-08-31 ENCOUNTER — APPOINTMENT (OUTPATIENT)
Dept: CT IMAGING | Facility: HOSPITAL | Age: 65
End: 2020-08-31
Attending: EMERGENCY MEDICINE
Payer: COMMERCIAL

## 2020-08-31 ENCOUNTER — HOSPITAL ENCOUNTER (EMERGENCY)
Facility: HOSPITAL | Age: 65
Discharge: HOME | End: 2020-08-31
Attending: EMERGENCY MEDICINE
Payer: COMMERCIAL

## 2020-08-31 VITALS
HEIGHT: 60 IN | OXYGEN SATURATION: 99 % | RESPIRATION RATE: 18 BRPM | DIASTOLIC BLOOD PRESSURE: 65 MMHG | BODY MASS INDEX: 36.12 KG/M2 | TEMPERATURE: 98 F | SYSTOLIC BLOOD PRESSURE: 128 MMHG | HEART RATE: 84 BPM | WEIGHT: 184 LBS

## 2020-08-31 DIAGNOSIS — M54.59 INTRACTABLE LOW BACK PAIN: Primary | ICD-10-CM

## 2020-08-31 LAB
ALBUMIN SERPL-MCNC: 3.8 G/DL (ref 3.4–5)
ALP LIVER SERPL-CCNC: 195 U/L (ref 50–130)
ALT SERPL-CCNC: 46 U/L (ref 13–56)
ANION GAP SERPL CALC-SCNC: 4 MMOL/L (ref 0–18)
AST SERPL-CCNC: 49 U/L (ref 15–37)
BASOPHILS # BLD AUTO: 0.05 X10(3) UL (ref 0–0.2)
BASOPHILS NFR BLD AUTO: 0.8 %
BILIRUB DIRECT SERPL-MCNC: 0.1 MG/DL (ref 0–0.2)
BILIRUB SERPL-MCNC: 0.5 MG/DL (ref 0.1–2)
BILIRUB UR QL: NEGATIVE
BUN BLD-MCNC: 17 MG/DL (ref 7–18)
BUN/CREAT SERPL: 25.8 (ref 10–20)
CALCIUM BLD-MCNC: 10 MG/DL (ref 8.5–10.1)
CHLORIDE SERPL-SCNC: 107 MMOL/L (ref 98–112)
CO2 SERPL-SCNC: 28 MMOL/L (ref 21–32)
COLOR UR: YELLOW
CREAT BLD-MCNC: 0.66 MG/DL (ref 0.55–1.02)
DEPRECATED RDW RBC AUTO: 42.2 FL (ref 35.1–46.3)
EOSINOPHIL # BLD AUTO: 0.22 X10(3) UL (ref 0–0.7)
EOSINOPHIL NFR BLD AUTO: 3.7 %
ERYTHROCYTE [DISTWIDTH] IN BLOOD BY AUTOMATED COUNT: 12.8 % (ref 11–15)
GLUCOSE BLD-MCNC: 90 MG/DL (ref 70–99)
GLUCOSE UR-MCNC: NEGATIVE MG/DL
HCT VFR BLD AUTO: 47.7 % (ref 35–48)
HGB BLD-MCNC: 16 G/DL (ref 12–16)
HGB UR QL STRIP.AUTO: NEGATIVE
IMM GRANULOCYTES # BLD AUTO: 0.02 X10(3) UL (ref 0–1)
IMM GRANULOCYTES NFR BLD: 0.3 %
KETONES UR-MCNC: NEGATIVE MG/DL
LYMPHOCYTES # BLD AUTO: 0.97 X10(3) UL (ref 1–4)
LYMPHOCYTES NFR BLD AUTO: 16.2 %
M PROTEIN MFR SERPL ELPH: 8.4 G/DL (ref 6.4–8.2)
MCH RBC QN AUTO: 30.3 PG (ref 26–34)
MCHC RBC AUTO-ENTMCNC: 33.5 G/DL (ref 31–37)
MCV RBC AUTO: 90.3 FL (ref 80–100)
MONOCYTES # BLD AUTO: 0.52 X10(3) UL (ref 0.1–1)
MONOCYTES NFR BLD AUTO: 8.7 %
NEUTROPHILS # BLD AUTO: 4.22 X10 (3) UL (ref 1.5–7.7)
NEUTROPHILS # BLD AUTO: 4.22 X10(3) UL (ref 1.5–7.7)
NEUTROPHILS NFR BLD AUTO: 70.3 %
NITRITE UR QL STRIP.AUTO: NEGATIVE
OSMOLALITY SERPL CALC.SUM OF ELEC: 289 MOSM/KG (ref 275–295)
PH UR: 5 [PH] (ref 5–8)
PLATELET # BLD AUTO: 115 10(3)UL (ref 150–450)
POTASSIUM SERPL-SCNC: 4 MMOL/L (ref 3.5–5.1)
PROT UR-MCNC: NEGATIVE MG/DL
RBC # BLD AUTO: 5.28 X10(6)UL (ref 3.8–5.3)
RBC #/AREA URNS AUTO: 2 /HPF
SODIUM SERPL-SCNC: 139 MMOL/L (ref 136–145)
SP GR UR STRIP: 1.02 (ref 1–1.03)
UROBILINOGEN UR STRIP-ACNC: <2
WBC # BLD AUTO: 6 X10(3) UL (ref 4–11)
WBC #/AREA URNS AUTO: 3 /HPF

## 2020-08-31 PROCEDURE — 85025 COMPLETE CBC W/AUTO DIFF WBC: CPT | Performed by: EMERGENCY MEDICINE

## 2020-08-31 PROCEDURE — 74177 CT ABD & PELVIS W/CONTRAST: CPT | Performed by: EMERGENCY MEDICINE

## 2020-08-31 PROCEDURE — 72128 CT CHEST SPINE W/O DYE: CPT | Performed by: EMERGENCY MEDICINE

## 2020-08-31 PROCEDURE — 99284 EMERGENCY DEPT VISIT MOD MDM: CPT

## 2020-08-31 PROCEDURE — 80048 BASIC METABOLIC PNL TOTAL CA: CPT | Performed by: EMERGENCY MEDICINE

## 2020-08-31 PROCEDURE — 80076 HEPATIC FUNCTION PANEL: CPT | Performed by: EMERGENCY MEDICINE

## 2020-08-31 PROCEDURE — 81001 URINALYSIS AUTO W/SCOPE: CPT | Performed by: EMERGENCY MEDICINE

## 2020-08-31 PROCEDURE — 36415 COLL VENOUS BLD VENIPUNCTURE: CPT

## 2020-08-31 RX ORDER — LIDOCAINE 50 MG/G
1 PATCH TOPICAL EVERY 24 HOURS
Qty: 7 PATCH | Refills: 0 | Status: SHIPPED | OUTPATIENT
Start: 2020-08-31 | End: 2020-09-07

## 2020-08-31 NOTE — ED INITIAL ASSESSMENT (HPI)
Patient presents to ER with c/o lower back pain x 1 week. States the back pressure/pain is causing her to feel like she always have to have a bowel movement or urinate. Denies incontinence. Denies injury or trauma.

## 2020-08-31 NOTE — ED PROVIDER NOTES
Patient Seen in: [facility] AND [facility] Emergency Department      History   Patient presents with:  Back Pain    Stated Complaint: lower back pain     HPI    Patient is a 61-year-old female who presents with mid to lower back pain x1 week.   She states it is c History    Tobacco Use      Smoking status: Light Tobacco Smoker        Years: 30.00      Smokeless tobacco: Never Used    Alcohol use: No      Alcohol/week: 0.0 standard drinks    Drug use:  No             Review of Systems    Positive for stated complaint Abnormal; Notable for the following components:    .0 (*)     Lymphocyte Absolute 0.97 (*)     All other components within normal limits   CBC WITH DIFFERENTIAL WITH PLATELET    Narrative:      The following orders were created for panel order CBC WI morphology with sequela of portal venous hypertension including mild splenomegaly and increasing perisplenic varices. No ascites. No discrete suspicious liver lesions. 3. Uncomplicated colonic diverticulosis.   4. No biliary ductal dilatation in this post

## 2020-10-26 DIAGNOSIS — E03.9 HYPOTHYROIDISM, UNSPECIFIED TYPE: ICD-10-CM

## 2020-10-27 RX ORDER — LEVOTHYROXINE SODIUM 125 UG/1
TABLET ORAL
Qty: 90 TABLET | Refills: 0 | Status: SHIPPED | OUTPATIENT
Start: 2020-10-27 | End: 2021-01-27

## 2021-01-26 DIAGNOSIS — E03.9 HYPOTHYROIDISM, UNSPECIFIED TYPE: ICD-10-CM

## 2021-01-27 RX ORDER — LEVOTHYROXINE SODIUM 125 UG/1
TABLET ORAL
Qty: 90 TABLET | Refills: 0 | Status: SHIPPED | OUTPATIENT
Start: 2021-01-27 | End: 2021-04-27

## 2021-03-11 ENCOUNTER — TELEPHONE (OUTPATIENT)
Dept: INTERNAL MEDICINE CLINIC | Facility: CLINIC | Age: 66
End: 2021-03-11

## 2021-03-11 DIAGNOSIS — Z12.31 ENCOUNTER FOR SCREENING MAMMOGRAM FOR MALIGNANT NEOPLASM OF BREAST: Primary | ICD-10-CM

## 2021-03-11 NOTE — TELEPHONE ENCOUNTER
Patient missed getting her mammogram due to Covid. She's asking if a mammogram order could please be placed.

## 2021-03-17 DIAGNOSIS — Z23 NEED FOR VACCINATION: ICD-10-CM

## 2021-03-18 ENCOUNTER — HOSPITAL ENCOUNTER (OUTPATIENT)
Dept: MAMMOGRAPHY | Age: 66
Discharge: HOME | End: 2021-03-18
Attending: FAMILY MEDICINE
Payer: COMMERCIAL

## 2021-03-18 DIAGNOSIS — Z12.31 ENCOUNTER FOR SCREENING MAMMOGRAM FOR MALIGNANT NEOPLASM OF BREAST: ICD-10-CM

## 2021-03-18 PROCEDURE — 77067 SCR MAMMO BI INCL CAD: CPT | Performed by: FAMILY MEDICINE

## 2021-03-18 PROCEDURE — 77063 BREAST TOMOSYNTHESIS BI: CPT | Performed by: FAMILY MEDICINE

## 2021-03-25 ENCOUNTER — HOSPITAL ENCOUNTER (OUTPATIENT)
Dept: ULTRASOUND IMAGING | Facility: HOSPITAL | Age: 66
Discharge: HOME | End: 2021-03-25
Attending: FAMILY MEDICINE
Payer: COMMERCIAL

## 2021-03-25 ENCOUNTER — HOSPITAL ENCOUNTER (OUTPATIENT)
Dept: MAMMOGRAPHY | Facility: HOSPITAL | Age: 66
Discharge: HOME | End: 2021-03-25
Attending: FAMILY MEDICINE
Payer: COMMERCIAL

## 2021-03-25 DIAGNOSIS — R92.8 ABNORMAL MAMMOGRAM: ICD-10-CM

## 2021-03-25 PROCEDURE — 76642 ULTRASOUND BREAST LIMITED: CPT | Performed by: FAMILY MEDICINE

## 2021-03-25 PROCEDURE — 77065 DX MAMMO INCL CAD UNI: CPT | Performed by: FAMILY MEDICINE

## 2021-03-25 PROCEDURE — 77061 BREAST TOMOSYNTHESIS UNI: CPT | Performed by: FAMILY MEDICINE

## 2021-04-12 ENCOUNTER — OFFICE VISIT (OUTPATIENT)
Dept: INTERNAL MEDICINE CLINIC | Facility: CLINIC | Age: 66
End: 2021-04-12
Payer: COMMERCIAL

## 2021-04-12 ENCOUNTER — HOSPITAL ENCOUNTER (OUTPATIENT)
Dept: GENERAL RADIOLOGY | Age: 66
Discharge: HOME | End: 2021-04-12
Attending: FAMILY MEDICINE
Payer: COMMERCIAL

## 2021-04-12 VITALS
BODY MASS INDEX: 38.6 KG/M2 | SYSTOLIC BLOOD PRESSURE: 128 MMHG | OXYGEN SATURATION: 98 % | DIASTOLIC BLOOD PRESSURE: 70 MMHG | HEART RATE: 81 BPM | HEIGHT: 60 IN | WEIGHT: 196.63 LBS

## 2021-04-12 DIAGNOSIS — Z72.0 TOBACCO USE: ICD-10-CM

## 2021-04-12 DIAGNOSIS — Z85.3 HISTORY OF BREAST CANCER: ICD-10-CM

## 2021-04-12 DIAGNOSIS — Z00.00 PHYSICAL EXAM: Primary | ICD-10-CM

## 2021-04-12 DIAGNOSIS — E03.9 HYPOTHYROIDISM, UNSPECIFIED TYPE: ICD-10-CM

## 2021-04-12 DIAGNOSIS — Z86.73 HISTORY OF TRANSIENT ISCHEMIC ATTACK (TIA): ICD-10-CM

## 2021-04-12 DIAGNOSIS — E78.5 HYPERLIPIDEMIA, UNSPECIFIED HYPERLIPIDEMIA TYPE: ICD-10-CM

## 2021-04-12 DIAGNOSIS — R07.89 STERNAL PAIN: ICD-10-CM

## 2021-04-12 DIAGNOSIS — E66.9 CLASS 2 OBESITY WITHOUT SERIOUS COMORBIDITY WITH BODY MASS INDEX (BMI) OF 38.0 TO 38.9 IN ADULT, UNSPECIFIED OBESITY TYPE: ICD-10-CM

## 2021-04-12 DIAGNOSIS — I65.23 BILATERAL CAROTID ARTERY STENOSIS: ICD-10-CM

## 2021-04-12 DIAGNOSIS — Z12.11 COLON CANCER SCREENING: ICD-10-CM

## 2021-04-12 PROCEDURE — 80061 LIPID PANEL: CPT | Performed by: FAMILY MEDICINE

## 2021-04-12 PROCEDURE — 82306 VITAMIN D 25 HYDROXY: CPT | Performed by: FAMILY MEDICINE

## 2021-04-12 PROCEDURE — 99396 PREV VISIT EST AGE 40-64: CPT | Performed by: FAMILY MEDICINE

## 2021-04-12 PROCEDURE — 3008F BODY MASS INDEX DOCD: CPT | Performed by: FAMILY MEDICINE

## 2021-04-12 PROCEDURE — 3074F SYST BP LT 130 MM HG: CPT | Performed by: FAMILY MEDICINE

## 2021-04-12 PROCEDURE — 36415 COLL VENOUS BLD VENIPUNCTURE: CPT | Performed by: FAMILY MEDICINE

## 2021-04-12 PROCEDURE — 71046 X-RAY EXAM CHEST 2 VIEWS: CPT | Performed by: FAMILY MEDICINE

## 2021-04-12 PROCEDURE — 93000 ELECTROCARDIOGRAM COMPLETE: CPT | Performed by: FAMILY MEDICINE

## 2021-04-12 PROCEDURE — 3078F DIAST BP <80 MM HG: CPT | Performed by: FAMILY MEDICINE

## 2021-04-12 PROCEDURE — 82977 ASSAY OF GGT: CPT | Performed by: FAMILY MEDICINE

## 2021-04-12 PROCEDURE — 80050 GENERAL HEALTH PANEL: CPT | Performed by: FAMILY MEDICINE

## 2021-04-12 PROCEDURE — 99406 BEHAV CHNG SMOKING 3-10 MIN: CPT | Performed by: FAMILY MEDICINE

## 2021-04-12 NOTE — PROGRESS NOTES
HPI:   Liudmila Haywood is a 63year old female who presents for a complete physical exam.  Last mammo: UTD  Last pap:  Overdue x 8 years   Previous colonoscopy:  Had one   Exercise: no but on feet at work     HL- off cholesterol medicine  Is on aspirin Value   08/15/2019 57   10/31/2018 59   05/09/2018 60   06/14/2017 51     HDL CHOLESTEROL (P) (mg/dL)   Date Value   08/26/2016 52     LDL Cholesterol (mg/dL)   Date Value   08/15/2019 129 (H)   10/31/2018 133 (H)   05/09/2018 138 (H)   06/14/2017 120 (H) noted in the the HPI      EXAM:   /70 (BP Location: Left arm, Patient Position: Sitting, Cuff Size: adult)   Pulse 81   Ht 5' (1.524 m)   Wt 196 lb 9.6 oz (89.2 kg)   LMP 01/01/2005 (Exact Date)   SpO2 98%   BMI 38.40 kg/m²   Body mass index is 38.4 plan.  · The patient is asked to return for CPX in 1 yr. Orders Placed This Encounter      CBC With Differential With Platelet      Comp Metabolic Panel (14)      TSH W Reflex To Free T4      Vitamin D, 25-Hydroxy      Lipid Panel    1.  Physical exam  Alycia Gan months. She is at risk of  stroke and heart attack given her medical issues. Patient voices understanding of this. - US CAROTID DOPPLER BILAT - DIAG IMG (CPT=93880); Future  - CT CALCIUM SCORING; Future    8.  Bilateral carotid artery stenosis      - US

## 2021-04-16 NOTE — PROGRESS NOTES
LVM for patient. Want to make sure she understands instructions from my MyChart message and see if she has any questions. Emphasized the importance of making these follow-up appointments with the specialist and with myself.   I can see that she read my my

## 2021-04-26 DIAGNOSIS — E03.9 HYPOTHYROIDISM, UNSPECIFIED TYPE: ICD-10-CM

## 2021-04-27 RX ORDER — LEVOTHYROXINE SODIUM 125 UG/1
TABLET ORAL
Qty: 90 TABLET | Refills: 0 | Status: SHIPPED | OUTPATIENT
Start: 2021-04-27 | End: 2021-07-27

## 2021-05-06 ENCOUNTER — HOSPITAL ENCOUNTER (OUTPATIENT)
Dept: ULTRASOUND IMAGING | Age: 66
Discharge: HOME | End: 2021-05-06
Attending: FAMILY MEDICINE
Payer: COMMERCIAL

## 2021-05-06 DIAGNOSIS — Z87.19 HISTORY OF CIRRHOSIS: ICD-10-CM

## 2021-05-06 DIAGNOSIS — R16.1 SPLENOMEGALY: ICD-10-CM

## 2021-05-06 DIAGNOSIS — R74.8 ELEVATED LIVER ENZYMES: ICD-10-CM

## 2021-05-06 PROCEDURE — 76700 US EXAM ABDOM COMPLETE: CPT | Performed by: FAMILY MEDICINE

## 2021-06-10 ENCOUNTER — HOSPITAL ENCOUNTER (OUTPATIENT)
Dept: GENERAL RADIOLOGY | Age: 66
Discharge: HOME | End: 2021-06-10
Attending: FAMILY MEDICINE
Payer: COMMERCIAL

## 2021-06-10 ENCOUNTER — OFFICE VISIT (OUTPATIENT)
Dept: INTERNAL MEDICINE CLINIC | Facility: CLINIC | Age: 66
End: 2021-06-10
Payer: COMMERCIAL

## 2021-06-10 VITALS
DIASTOLIC BLOOD PRESSURE: 86 MMHG | OXYGEN SATURATION: 99 % | SYSTOLIC BLOOD PRESSURE: 124 MMHG | BODY MASS INDEX: 36.71 KG/M2 | WEIGHT: 187 LBS | RESPIRATION RATE: 17 BRPM | HEIGHT: 60 IN | HEART RATE: 86 BPM

## 2021-06-10 DIAGNOSIS — M76.61 TENDONITIS, ACHILLES, RIGHT: ICD-10-CM

## 2021-06-10 DIAGNOSIS — Z85.3 HISTORY OF BREAST CANCER: ICD-10-CM

## 2021-06-10 DIAGNOSIS — R05.3 CHRONIC COUGH: ICD-10-CM

## 2021-06-10 DIAGNOSIS — M79.671 RIGHT FOOT PAIN: ICD-10-CM

## 2021-06-10 DIAGNOSIS — F17.200 SMOKER: ICD-10-CM

## 2021-06-10 DIAGNOSIS — R05.9 COUGH: ICD-10-CM

## 2021-06-10 DIAGNOSIS — E78.5 HYPERLIPIDEMIA, UNSPECIFIED HYPERLIPIDEMIA TYPE: ICD-10-CM

## 2021-06-10 DIAGNOSIS — Z86.73 HISTORY OF TRANSIENT ISCHEMIC ATTACK (TIA): ICD-10-CM

## 2021-06-10 DIAGNOSIS — D69.6 THROMBOCYTOPENIA (HCC): Primary | ICD-10-CM

## 2021-06-10 DIAGNOSIS — R06.00 DYSPNEA ON EXERTION: ICD-10-CM

## 2021-06-10 PROCEDURE — 3074F SYST BP LT 130 MM HG: CPT | Performed by: FAMILY MEDICINE

## 2021-06-10 PROCEDURE — 99215 OFFICE O/P EST HI 40 MIN: CPT | Performed by: FAMILY MEDICINE

## 2021-06-10 PROCEDURE — 3008F BODY MASS INDEX DOCD: CPT | Performed by: FAMILY MEDICINE

## 2021-06-10 PROCEDURE — 3079F DIAST BP 80-89 MM HG: CPT | Performed by: FAMILY MEDICINE

## 2021-06-10 PROCEDURE — 71046 X-RAY EXAM CHEST 2 VIEWS: CPT | Performed by: FAMILY MEDICINE

## 2021-06-10 RX ORDER — ATORVASTATIN CALCIUM 20 MG/1
20 TABLET, FILM COATED ORAL NIGHTLY
Qty: 90 TABLET | Refills: 0 | Status: SHIPPED | OUTPATIENT
Start: 2021-06-10

## 2021-06-10 RX ORDER — AZITHROMYCIN 250 MG/1
TABLET, FILM COATED ORAL
Qty: 6 TABLET | Refills: 0 | Status: SHIPPED | OUTPATIENT
Start: 2021-06-10 | End: 2021-06-15

## 2021-06-10 RX ORDER — ALBUTEROL SULFATE 90 UG/1
2 AEROSOL, METERED RESPIRATORY (INHALATION) EVERY 4 HOURS PRN
Qty: 1 EACH | Refills: 0 | Status: SHIPPED | OUTPATIENT
Start: 2021-06-10 | End: 2022-06-10

## 2021-06-10 NOTE — PROGRESS NOTES
Shi Adan is a 63year old female. CC:  Patient presents with: Follow - Up: Here to discuss recent labs. Foot Injury: F/up on right foot injury?  Finished steroid pack to no relief from podiatrist.      HPI:      Pt with hx breast cancer, liver ci kg)  04/12/21 : 196 lb 9.6 oz (89.2 kg)  08/31/20 : 184 lb (83.5 kg)  08/15/19 : 193 lb (87.5 kg)  11/15/18 : 188 lb (85.3 kg)  11/06/18 : 188 lb (85.3 kg)          Patient Active Problem List:     TIA (transient ischemic attack)     Hypothyroid     Histor • Carotid artery stenosis     R , s/p radiation   • Cirrhosis of liver ([facility] [address] 2013   • Disorder of liver    • History of blood transfusion 2005   • Obesity    • Other and unspecified hyperlipidemia    • S/P TRAM (transverse rectus abdominis muscle) flap b cough  EYE: Bilateral conjunctiva and lids normal  HENT: Moist oral mucosa, normal teeth  NECK: No lymphadenopathy or masses  CAR:  RRR, no murmurs, S1 and S2 normal  PULM: + scattered wheezes more R upper lung   PSYCH:  Alert and oriented x 3, affect appr day, THEN 1 tablet (250 mg total) daily for 4 days. Dispense: 6 tablet; Refill: 0    7.  Dyspnea on exertion  Given her dyspnea on exertion for the past few months I recommend stress echo to look for any cardiac etiology and also pulmonary consult and PFTs INTERNAL  PULMONARY - INTERNAL  CARD ECHO STRESS ECHO/REST AND STRESS(CPT=93350/78937 [facility] [address])

## 2021-06-10 NOTE — PATIENT INSTRUCTIONS
- start atorvastatin  Recheck fasting labs in 6 weeks  ( will add anti inflammatory markers also)    Follow up in 2 mos- will do pap then-     Call if breathing worse

## 2021-06-17 ENCOUNTER — OFFICE VISIT (OUTPATIENT)
Dept: INTERNAL MEDICINE CLINIC | Facility: CLINIC | Age: 66
End: 2021-06-17
Payer: COMMERCIAL

## 2021-06-17 VITALS
HEIGHT: 60 IN | BODY MASS INDEX: 36.85 KG/M2 | HEART RATE: 86 BPM | SYSTOLIC BLOOD PRESSURE: 128 MMHG | DIASTOLIC BLOOD PRESSURE: 81 MMHG | WEIGHT: 187.69 LBS | OXYGEN SATURATION: 96 %

## 2021-06-17 DIAGNOSIS — F17.200 SMOKER: ICD-10-CM

## 2021-06-17 DIAGNOSIS — R06.2 WHEEZING: ICD-10-CM

## 2021-06-17 DIAGNOSIS — E78.5 HYPERLIPIDEMIA, UNSPECIFIED HYPERLIPIDEMIA TYPE: ICD-10-CM

## 2021-06-17 DIAGNOSIS — R06.00 DYSPNEA ON EXERTION: ICD-10-CM

## 2021-06-17 DIAGNOSIS — R92.8 ABNORMAL MAMMOGRAM: ICD-10-CM

## 2021-06-17 DIAGNOSIS — J40 BRONCHITIS: Primary | ICD-10-CM

## 2021-06-17 DIAGNOSIS — M79.671 RIGHT FOOT PAIN: ICD-10-CM

## 2021-06-17 DIAGNOSIS — R05.3 CHRONIC COUGH: ICD-10-CM

## 2021-06-17 PROCEDURE — 3079F DIAST BP 80-89 MM HG: CPT | Performed by: FAMILY MEDICINE

## 2021-06-17 PROCEDURE — 3008F BODY MASS INDEX DOCD: CPT | Performed by: FAMILY MEDICINE

## 2021-06-17 PROCEDURE — 99214 OFFICE O/P EST MOD 30 MIN: CPT | Performed by: FAMILY MEDICINE

## 2021-06-17 PROCEDURE — 3074F SYST BP LT 130 MM HG: CPT | Performed by: FAMILY MEDICINE

## 2021-06-17 RX ORDER — ERGOCALCIFEROL 1.25 MG/1
50000 CAPSULE ORAL WEEKLY
COMMUNITY
Start: 2021-06-12

## 2021-06-17 RX ORDER — METHYLPREDNISOLONE 4 MG/1
TABLET ORAL
Qty: 1 EACH | Refills: 0 | Status: SHIPPED | OUTPATIENT
Start: 2021-06-17

## 2021-06-17 NOTE — PROGRESS NOTES
Gil Racer is a 63year old female. CC:  Patient presents with:   Follow - Up: sore throat/cough  Foot Pain: right foot pain      HPI:      Pt with hx breast cancer, liver cirrhosis, TIA, hypothyroid here for fu  F/u from multiple issues last week  H ANAPHYLAXIS    Comment:Pt also had blue dye at same time and is not sure             which caused her rxn. Sulfa Antibiotics       ANAPHYLAXIS    Comment:SOME TYPE OF BLUE DYE USED IN BIOPSY.  Pt had pcn             at same time and is not sure which MASTECTOMY RIGHT     • OTHER      masectomy   • RADIATION RIGHT     • REMOVAL GALLBLADDER     • STENT PLACEMT RETRO CAROTID      2011--right carotid      Family History   Problem Relation Age of Onset   • Other (Other) Mother         Aneurysm 1974   • Canchris coughing  Cont albuterol prn  Start zpak  If still not better start medrol dose pack  - methylPREDNISolone (MEDROL) 4 MG Oral Tablet Therapy Pack; As directed. Dispense: 1 each; Refill: 0    2.  Wheezing  See#1  Exam improved   No hx asthma  - methylPREDNI

## 2021-06-17 NOTE — PATIENT INSTRUCTIONS
1. Start antibiotic and use inhaler if needed. If breathing not better also start medrol pack   2. I want you to set up an appoint with the pulmonologist.  I placed a referral.  Try to see Dr. Bridgette May or Dr. Taz Romero. Their number is [phone].   You may h

## 2021-06-24 ENCOUNTER — TELEPHONE (OUTPATIENT)
Dept: GASTROENTEROLOGY | Facility: CLINIC | Age: 66
End: 2021-06-24

## 2021-06-24 ENCOUNTER — OFFICE VISIT (OUTPATIENT)
Dept: GASTROENTEROLOGY | Facility: CLINIC | Age: 66
End: 2021-06-24
Payer: COMMERCIAL

## 2021-06-24 VITALS
DIASTOLIC BLOOD PRESSURE: 83 MMHG | SYSTOLIC BLOOD PRESSURE: 142 MMHG | HEIGHT: 60 IN | BODY MASS INDEX: 36.52 KG/M2 | HEART RATE: 94 BPM | WEIGHT: 186 LBS

## 2021-06-24 DIAGNOSIS — R74.8 ABNORMAL LIVER ENZYMES: Primary | ICD-10-CM

## 2021-06-24 PROCEDURE — 3079F DIAST BP 80-89 MM HG: CPT | Performed by: INTERNAL MEDICINE

## 2021-06-24 PROCEDURE — 3077F SYST BP >= 140 MM HG: CPT | Performed by: INTERNAL MEDICINE

## 2021-06-24 PROCEDURE — 3008F BODY MASS INDEX DOCD: CPT | Performed by: INTERNAL MEDICINE

## 2021-06-24 PROCEDURE — 99203 OFFICE O/P NEW LOW 30 MIN: CPT | Performed by: INTERNAL MEDICINE

## 2021-06-24 NOTE — PROGRESS NOTES
Rosales Linares is a 63year old female.     HPI:   Patient presents with:  Consult: elevated liver enzymes    The patient is a 43-year-old female who has a history of biliary calculus, breast cancer on prior tamoxifen therapy, obesity, thyroid disease who h 30.00      Smokeless tobacco: Never Used    Alcohol use: No      Alcohol/week: 0.0 standard drinks    Drug use: No       Medications (Active prior to today's visit):  Current Outpatient Medications   Medication Sig Dispense Refill   • ergocalciferol 1.25 M gallop  Abdomen- Soft and nontender, nondistended  Ext- no clubbing or cyanosis  Skin- no rashes or lesions  Neuro- appropriate response, alert, no confusion  .   ASSESSMENT/PLAN:   Assessment   NAFLD/cirrhosis     The patient likely has underlying fatty li

## 2021-06-24 NOTE — TELEPHONE ENCOUNTER
Scheduled for:  EGD 20109  Provider Name:  Dr Polly Ulrich  Date:  08/31/2021  Location:  [facility]  Sedation:  MAC  Time:  11:00 (pt is aware to arrive at 1000)  Prep:  NPO after midnight  Meds/Allergies Reconciled?:  Physician reviewed  Diagnosis with codes:  Abnorm

## 2021-06-24 NOTE — PATIENT INSTRUCTIONS
Abnormal liver tests  - cirrhosis noted on ultasound and low platelets  - lab work every 6 months + AFP and ultrasound   - special ultrasound now  - healthy weight and diet, exercise  - EGD now with MAC sedation/evaluation for varices  - see me in office a

## 2021-06-28 ENCOUNTER — LAB ENCOUNTER (OUTPATIENT)
Dept: LAB | Age: 66
End: 2021-06-28
Attending: INTERNAL MEDICINE
Payer: COMMERCIAL

## 2021-06-28 DIAGNOSIS — R74.8 ABNORMAL LIVER ENZYMES: ICD-10-CM

## 2021-06-28 LAB
AFP-TM SERPL-MCNC: 4 NG/ML (ref ?–8)
ALBUMIN SERPL-MCNC: 3.8 G/DL (ref 3.4–5)
ALP LIVER SERPL-CCNC: 169 U/L
ALT SERPL-CCNC: 52 U/L
AST SERPL-CCNC: 53 U/L (ref 15–37)
BILIRUB DIRECT SERPL-MCNC: 0.2 MG/DL (ref 0–0.2)
BILIRUB SERPL-MCNC: 0.6 MG/DL (ref 0.1–2)
HAV AB SER QL IA: NONREACTIVE
HBV CORE AB SERPL QL IA: NONREACTIVE
HBV SURFACE AB SER QL: NONREACTIVE
HBV SURFACE AB SERPL IA-ACNC: <3.1 MIU/ML
HBV SURFACE AG SERPL QL IA: NONREACTIVE
HCV AB SERPL QL IA: NONREACTIVE
INR BLD: 1.08 (ref 0.9–1.2)
M PROTEIN MFR SERPL ELPH: 7.4 G/DL (ref 6.4–8.2)
PROTHROMBIN TIME: 13.8 SECONDS (ref 11.8–14.5)

## 2021-06-28 PROCEDURE — 86038 ANTINUCLEAR ANTIBODIES: CPT

## 2021-06-28 PROCEDURE — 84080 ASSAY ALKALINE PHOSPHATASES: CPT | Performed by: INTERNAL MEDICINE

## 2021-06-28 PROCEDURE — 86706 HEP B SURFACE ANTIBODY: CPT

## 2021-06-28 PROCEDURE — 86704 HEP B CORE ANTIBODY TOTAL: CPT

## 2021-06-28 PROCEDURE — 80076 HEPATIC FUNCTION PANEL: CPT

## 2021-06-28 PROCEDURE — 86039 ANTINUCLEAR ANTIBODIES (ANA): CPT

## 2021-06-28 PROCEDURE — 85610 PROTHROMBIN TIME: CPT

## 2021-06-28 PROCEDURE — 80500 HEPATITIS A B + C PROFILE: CPT

## 2021-06-28 PROCEDURE — 86803 HEPATITIS C AB TEST: CPT

## 2021-06-28 PROCEDURE — 82105 ALPHA-FETOPROTEIN SERUM: CPT

## 2021-06-28 PROCEDURE — 86376 MICROSOMAL ANTIBODY EACH: CPT

## 2021-06-28 PROCEDURE — 36415 COLL VENOUS BLD VENIPUNCTURE: CPT

## 2021-06-28 PROCEDURE — 87340 HEPATITIS B SURFACE AG IA: CPT

## 2021-06-28 PROCEDURE — 86255 FLUORESCENT ANTIBODY SCREEN: CPT

## 2021-06-28 PROCEDURE — 86708 HEPATITIS A ANTIBODY: CPT

## 2021-06-28 PROCEDURE — 84075 ASSAY ALKALINE PHOSPHATASE: CPT | Performed by: INTERNAL MEDICINE

## 2021-06-29 LAB
LKM-1 AB SER-ACNC: <20
MITOCHONDRIA AB TITR SER: <20 {TITER}

## 2021-07-01 LAB — NUCLEAR IGG TITR SER IF: POSITIVE {TITER}

## 2021-07-02 LAB
ALK-PHOSPHATASE BONE CALC: 62 U/L
ALK-PHOSPHATASE LIVER CALC: 116 U/L
ALK-PHOSPHATASE OTHER CALC: 0 U/L
ALKALINE PHOSPHATASE: 178 U/L

## 2021-07-03 LAB — ANA NUCLEOLAR TITR SER IF: 160 {TITER}

## 2021-07-08 ENCOUNTER — TELEPHONE (OUTPATIENT)
Dept: GASTROENTEROLOGY | Facility: CLINIC | Age: 66
End: 2021-07-08

## 2021-07-08 ENCOUNTER — TELEPHONE (OUTPATIENT)
Dept: INTERNAL MEDICINE CLINIC | Facility: CLINIC | Age: 66
End: 2021-07-08

## 2021-07-08 NOTE — TELEPHONE ENCOUNTER
Dr. Martinez Gonzalez    Patient had blood drawn 6/28/2021. She is calling for results.     Thank you

## 2021-07-08 NOTE — TELEPHONE ENCOUNTER
Called pt back  rec wait for Dr Tacos Mejia to get back with all results- overall looks ok so far  Pt has ?s re positive LADONNA. Discussed this is nonspecific.  She has been having a lot of joint pains so I recommend she do go see rheumatology for eval  Referral inf

## 2021-07-13 NOTE — TELEPHONE ENCOUNTER
msg left on vm, pt to call back, lab work looks about the same, stable. Slight elevation in alk phos from possible bone however most of this elevation appears to be from liver.   Continue to follow liver tests every 6 months plus alpha-fetoprotein and ultr

## 2021-07-13 NOTE — TELEPHONE ENCOUNTER
Patient contacted, verified and message from Dr. Sofia Villegas given. Patient voiced understanding. Patient is currently out of town but will schedule her ultrasound as soon as she returns.

## 2021-07-26 DIAGNOSIS — E03.9 HYPOTHYROIDISM, UNSPECIFIED TYPE: ICD-10-CM

## 2021-07-27 RX ORDER — LEVOTHYROXINE SODIUM 125 UG/1
TABLET ORAL
Qty: 90 TABLET | Refills: 0 | Status: SHIPPED | OUTPATIENT
Start: 2021-07-27 | End: 2021-10-25

## 2021-08-03 ENCOUNTER — TELEPHONE (OUTPATIENT)
Dept: GASTROENTEROLOGY | Facility: CLINIC | Age: 66
End: 2021-08-03

## 2021-08-12 ENCOUNTER — TELEPHONE (OUTPATIENT)
Dept: GASTROENTEROLOGY | Facility: CLINIC | Age: 66
End: 2021-08-12

## 2021-08-12 DIAGNOSIS — R74.8 ABNORMAL LIVER ENZYMES: Primary | ICD-10-CM

## 2021-08-12 NOTE — TELEPHONE ENCOUNTER
Cancelled for:  EGD 86850  Provider Name:  Dr Paddy Soto  Date:  08/31/2021  Location:  [facility]  Sedation:  MAC  Time:  1100    Prep:  NPO after midnight  Meds/Allergies Reconciled?:  Physician reviewed  Diagnosis with codes:  Abnormal Liver Enzymes R74.8  Was pat

## 2021-09-03 ENCOUNTER — HOSPITAL ENCOUNTER (OUTPATIENT)
Dept: ULTRASOUND IMAGING | Age: 66
Discharge: HOME | End: 2021-09-03
Attending: INTERNAL MEDICINE
Payer: COMMERCIAL

## 2021-09-03 DIAGNOSIS — R74.8 ABNORMAL LIVER ENZYMES: ICD-10-CM

## 2021-09-03 PROCEDURE — 76705 ECHO EXAM OF ABDOMEN: CPT | Performed by: INTERNAL MEDICINE

## 2021-09-03 PROCEDURE — 76981 USE PARENCHYMA: CPT | Performed by: INTERNAL MEDICINE

## 2021-09-20 ENCOUNTER — HOSPITAL ENCOUNTER (OUTPATIENT)
Dept: MAMMOGRAPHY | Facility: HOSPITAL | Age: 66
Discharge: HOME | End: 2021-09-20
Attending: FAMILY MEDICINE
Payer: COMMERCIAL

## 2021-09-20 DIAGNOSIS — R92.8 ABNORMAL MAMMOGRAM: ICD-10-CM

## 2021-09-20 PROCEDURE — 77061 BREAST TOMOSYNTHESIS UNI: CPT | Performed by: FAMILY MEDICINE

## 2021-09-20 PROCEDURE — 77065 DX MAMMO INCL CAD UNI: CPT | Performed by: FAMILY MEDICINE

## 2021-09-27 ENCOUNTER — TELEPHONE (OUTPATIENT)
Dept: GASTROENTEROLOGY | Facility: CLINIC | Age: 66
End: 2021-09-27

## 2021-09-27 DIAGNOSIS — R74.8 ABNORMAL LIVER ENZYMES: Primary | ICD-10-CM

## 2021-09-27 NOTE — TELEPHONE ENCOUNTER
Pt is calling to talk to  about the results of her [address] she had done on 9-3-21.  She has not heard anything, please call

## 2021-09-30 NOTE — TELEPHONE ENCOUNTER
Dr. Aminta Sanches note for US says to see TE. Did you talk to the patient about the ultrasound results?     Thank you

## 2021-10-04 NOTE — TELEPHONE ENCOUNTER
Ultrasound results reviewed with the patient on September 28. She is seeing rheumatology for input regarding autoimmune marker elevation.   Follow-up labs--ordered liver panel for October

## 2021-10-06 NOTE — TELEPHONE ENCOUNTER
Noted.  I mailed lab order and recall letter for repeat lab due this month (October) to patient's home address.     Thank you

## 2021-10-24 DIAGNOSIS — E03.9 HYPOTHYROIDISM, UNSPECIFIED TYPE: ICD-10-CM

## 2021-10-25 RX ORDER — LEVOTHYROXINE SODIUM 125 UG/1
TABLET ORAL
Qty: 90 TABLET | Refills: 0 | Status: SHIPPED | OUTPATIENT
Start: 2021-10-25 | End: 2022-01-24

## 2021-10-25 NOTE — TELEPHONE ENCOUNTER
Left message for patient to call back to let her know that she is due for a Hepatic Function Panel (order already placed).

## 2021-11-03 NOTE — TELEPHONE ENCOUNTER
Left detailed message (ok per FYI) informing patient that she is due for repeat lab work and how to do this. I asked that she return my call. CSS:  Please transfer to RN if patient calls back. Thank you.

## 2021-11-04 NOTE — TELEPHONE ENCOUNTER
I spoke to the patient. Aware she is due for a hepatic function panel. She told me that she will get this done in the next day or two.

## 2021-12-07 ENCOUNTER — LAB ENCOUNTER (OUTPATIENT)
Dept: LAB | Age: 66
End: 2021-12-07
Attending: INTERNAL MEDICINE
Payer: COMMERCIAL

## 2021-12-07 DIAGNOSIS — R74.8 ABNORMAL LIVER ENZYMES: ICD-10-CM

## 2021-12-07 PROCEDURE — 80076 HEPATIC FUNCTION PANEL: CPT

## 2021-12-07 PROCEDURE — 36415 COLL VENOUS BLD VENIPUNCTURE: CPT

## 2022-01-04 ENCOUNTER — TELEPHONE (OUTPATIENT)
Dept: GASTROENTEROLOGY | Facility: CLINIC | Age: 67
End: 2022-01-04

## 2022-01-04 DIAGNOSIS — K74.60 CIRRHOSIS OF LIVER WITHOUT ASCITES, UNSPECIFIED HEPATIC CIRRHOSIS TYPE (HCC): Primary | ICD-10-CM

## 2022-01-05 ENCOUNTER — TELEPHONE (OUTPATIENT)
Dept: GASTROENTEROLOGY | Facility: CLINIC | Age: 67
End: 2022-01-05

## 2022-01-05 ENCOUNTER — TELEMEDICINE (OUTPATIENT)
Dept: INTERNAL MEDICINE CLINIC | Facility: CLINIC | Age: 67
End: 2022-01-05
Payer: COMMERCIAL

## 2022-01-05 DIAGNOSIS — U07.1 COVID: Primary | ICD-10-CM

## 2022-01-05 DIAGNOSIS — K08.89 PAIN, DENTAL: ICD-10-CM

## 2022-01-05 PROCEDURE — 99213 OFFICE O/P EST LOW 20 MIN: CPT | Performed by: FAMILY MEDICINE

## 2022-01-05 RX ORDER — CEFDINIR 300 MG/1
300 CAPSULE ORAL 2 TIMES DAILY
Qty: 14 CAPSULE | Refills: 0 | Status: SHIPPED | OUTPATIENT
Start: 2022-01-05 | End: 2022-01-12

## 2022-01-05 NOTE — TELEPHONE ENCOUNTER
----- Message from Noemi Ballard MD sent at 1/4/2022  5:57 PM CST -----  Lab work stable with continued elevation in AP and mild elevation in AST  Repeat in 6 months with AFP and due for repeat liver ultrasound in March- order placed.

## 2022-01-05 NOTE — PROGRESS NOTES
Virtual Telephone Check-In  Patient verbally consents to a Virtual/Telephone Check-In visit on 1/5/22   Patient understands and accepts financial responsibility for any deductible, co-insurance and/or co-pays associated with this service.     Duration of th Tab Take 1 tablet (20 mg total) by mouth nightly. 90 tablet 0   • Albuterol Sulfate HFA (PROAIR HFA) 108 (90 Base) MCG/ACT Inhalation Aero Soln Inhale 2 puffs into the lungs every 4 (four) hours as needed for Wheezing.  1 each 0   • aspirin 325 MG Oral Tab guidelines  Still rec booster when out of isolation     2.  Pain, dental  ? Related to inflammation from covid  rec make appt with dentist/ brush/ floss/ warm compresses  If worsening pain/ swelling - start antibiotic ( questionable allergy to pcn but has t

## 2022-01-05 NOTE — TELEPHONE ENCOUNTER
Recall for ultrasound due March 2022 entered into patient outreach in [address] [place] [address] Rd. See other telephone encounter for lab recall/further documentation.

## 2022-01-05 NOTE — TELEPHONE ENCOUNTER
----- Message from Chepe Young MD sent at 1/4/2022  5:57 PM CST -----  Lab work stable with continued elevation in AP and mild elevation in AST  Repeat in 6 months with AFP and due for repeat liver ultrasound in March- order placed.

## 2022-01-06 NOTE — TELEPHONE ENCOUNTER
Patient contacted, I reviewed below complete result note over the phone and she voiced understanding. Aware of recalls. Lab recall for labs due 6/7/2022 entered in this encounter under patient outreach.     See other telephone encounter dated 1/5/2022 f

## 2022-01-23 DIAGNOSIS — E03.9 HYPOTHYROIDISM, UNSPECIFIED TYPE: ICD-10-CM

## 2022-01-24 RX ORDER — LEVOTHYROXINE SODIUM 125 UG/1
TABLET ORAL
Qty: 90 TABLET | Refills: 0 | Status: SHIPPED | OUTPATIENT
Start: 2022-01-24

## 2022-02-23 ENCOUNTER — TELEPHONE (OUTPATIENT)
Dept: GASTROENTEROLOGY | Facility: CLINIC | Age: 67
End: 2022-02-23

## 2022-02-23 NOTE — TELEPHONE ENCOUNTER
Left message for patient to call back. I want to let her know that she is due for repeat ultrasound in March and order already placed.

## 2022-02-23 NOTE — TELEPHONE ENCOUNTER
Patient outreach message received:      Recall for ultrasound due March 2022 entered into patient outreach in [address] See other telephone encounter for lab recall/further documentation.

## 2022-02-25 NOTE — TELEPHONE ENCOUNTER
Patient contacted. She is aware that she is due for Ultrasound and order placed.     She told me that she already has the number to central scheduling and will call them to schedule the Ultrasound as ordered by Dr. Sayda John and her mammogram.

## 2022-03-24 ENCOUNTER — HOSPITAL ENCOUNTER (OUTPATIENT)
Dept: MAMMOGRAPHY | Facility: HOSPITAL | Age: 67
Discharge: HOME | End: 2022-03-24
Attending: FAMILY MEDICINE
Payer: COMMERCIAL

## 2022-03-24 DIAGNOSIS — R92.8 ABNORMAL MAMMOGRAM: ICD-10-CM

## 2022-03-24 DIAGNOSIS — N63.0 MASS OF BREAST, UNSPECIFIED LATERALITY: ICD-10-CM

## 2022-03-24 PROCEDURE — 77061 BREAST TOMOSYNTHESIS UNI: CPT | Performed by: FAMILY MEDICINE

## 2022-03-24 PROCEDURE — 77065 DX MAMMO INCL CAD UNI: CPT | Performed by: FAMILY MEDICINE

## 2022-04-28 RX ORDER — LEVOTHYROXINE SODIUM 125 UG/1
TABLET ORAL
Qty: 90 TABLET | Refills: 0 | Status: SHIPPED | OUTPATIENT
Start: 2022-04-28

## 2022-05-20 ENCOUNTER — TELEPHONE (OUTPATIENT)
Dept: GASTROENTEROLOGY | Facility: CLINIC | Age: 67
End: 2022-05-20

## 2022-06-22 ENCOUNTER — APPOINTMENT (OUTPATIENT)
Dept: CT IMAGING | Age: 67
End: 2022-06-22
Attending: EMERGENCY MEDICINE
Payer: COMMERCIAL

## 2022-06-22 ENCOUNTER — HOSPITAL ENCOUNTER (OUTPATIENT)
Age: 67
Discharge: HOME | End: 2022-06-22
Attending: EMERGENCY MEDICINE
Payer: COMMERCIAL

## 2022-06-22 VITALS
HEART RATE: 90 BPM | DIASTOLIC BLOOD PRESSURE: 69 MMHG | RESPIRATION RATE: 18 BRPM | OXYGEN SATURATION: 98 % | SYSTOLIC BLOOD PRESSURE: 147 MMHG | TEMPERATURE: 98 F

## 2022-06-22 DIAGNOSIS — N30.01 ACUTE CYSTITIS WITH HEMATURIA: Primary | ICD-10-CM

## 2022-06-22 LAB
BILIRUB UR QL STRIP: NEGATIVE
GLUCOSE UR STRIP-MCNC: NEGATIVE MG/DL
KETONES UR STRIP-MCNC: NEGATIVE MG/DL
NITRITE UR QL STRIP: POSITIVE
PH UR STRIP: 5 [PH]
PROT UR STRIP-MCNC: 100 MG/DL
SP GR UR STRIP: >=1.03
UROBILINOGEN UR STRIP-ACNC: <2 MG/DL

## 2022-06-22 PROCEDURE — 99214 OFFICE O/P EST MOD 30 MIN: CPT

## 2022-06-22 PROCEDURE — 74176 CT ABD & PELVIS W/O CONTRAST: CPT | Performed by: EMERGENCY MEDICINE

## 2022-06-22 PROCEDURE — 81002 URINALYSIS NONAUTO W/O SCOPE: CPT

## 2022-06-22 PROCEDURE — 87077 CULTURE AEROBIC IDENTIFY: CPT | Performed by: EMERGENCY MEDICINE

## 2022-06-22 PROCEDURE — 87086 URINE CULTURE/COLONY COUNT: CPT | Performed by: EMERGENCY MEDICINE

## 2022-06-22 PROCEDURE — 87186 SC STD MICRODIL/AGAR DIL: CPT | Performed by: EMERGENCY MEDICINE

## 2022-06-22 RX ORDER — NITROFURANTOIN 25; 75 MG/1; MG/1
100 CAPSULE ORAL 2 TIMES DAILY
Qty: 14 CAPSULE | Refills: 0 | Status: SHIPPED | OUTPATIENT
Start: 2022-06-22 | End: 2022-06-29

## 2022-06-22 NOTE — ED INITIAL ASSESSMENT (HPI)
Patient states she started having urinary symptoms about 2 weeks ago, including cloudy urine, pain with urination, frequent urination, along with lower back pain and groin pain. Patient noted some pink color urine in the morning as well.

## 2022-07-21 DIAGNOSIS — E03.9 HYPOTHYROIDISM, UNSPECIFIED TYPE: ICD-10-CM

## 2022-07-21 RX ORDER — LEVOTHYROXINE SODIUM 125 UG/1
TABLET ORAL
Qty: 90 TABLET | Refills: 0 | OUTPATIENT
Start: 2022-07-21

## 2022-07-25 DIAGNOSIS — E03.9 HYPOTHYROIDISM, UNSPECIFIED TYPE: ICD-10-CM

## 2022-07-25 RX ORDER — LEVOTHYROXINE SODIUM 125 UG/1
TABLET ORAL
Qty: 90 TABLET | Refills: 0 | OUTPATIENT
Start: 2022-07-25

## 2022-07-26 RX ORDER — LEVOTHYROXINE SODIUM 0.12 MG/1
125 TABLET ORAL
Qty: 90 TABLET | Refills: 0 | OUTPATIENT
Start: 2022-07-26

## 2022-07-28 DIAGNOSIS — E03.9 HYPOTHYROIDISM, UNSPECIFIED TYPE: ICD-10-CM

## 2022-07-29 DIAGNOSIS — E03.9 HYPOTHYROIDISM, UNSPECIFIED TYPE: ICD-10-CM

## 2022-07-29 NOTE — TELEPHONE ENCOUNTER
Last OV:1-5-22  Last refill:4-28-22    Next Appt:    With [address] [place] [address] Cliff Velez MD)  10/06/2022 at 9:40 AM

## 2022-07-31 RX ORDER — LEVOTHYROXINE SODIUM 125 UG/1
TABLET ORAL
Qty: 90 TABLET | Refills: 0 | Status: SHIPPED | OUTPATIENT
Start: 2022-07-31

## 2022-07-31 RX ORDER — LEVOTHYROXINE SODIUM 0.12 MG/1
125 TABLET ORAL
Qty: 90 TABLET | Refills: 0 | OUTPATIENT
Start: 2022-07-31

## 2022-08-01 RX ORDER — LEVOTHYROXINE SODIUM 0.12 MG/1
125 TABLET ORAL
Qty: 90 TABLET | Refills: 0 | OUTPATIENT
Start: 2022-08-01

## 2022-10-06 ENCOUNTER — OFFICE VISIT (OUTPATIENT)
Dept: INTERNAL MEDICINE CLINIC | Facility: CLINIC | Age: 67
End: 2022-10-06
Payer: MEDICARE

## 2022-10-06 VITALS
RESPIRATION RATE: 17 BRPM | SYSTOLIC BLOOD PRESSURE: 138 MMHG | HEIGHT: 60 IN | OXYGEN SATURATION: 98 % | BODY MASS INDEX: 38.48 KG/M2 | HEART RATE: 97 BPM | WEIGHT: 196 LBS | DIASTOLIC BLOOD PRESSURE: 86 MMHG

## 2022-10-06 DIAGNOSIS — Z90.11 H/O RIGHT MASTECTOMY: ICD-10-CM

## 2022-10-06 DIAGNOSIS — Z85.3 HISTORY OF BILATERAL BREAST CANCER: Primary | ICD-10-CM

## 2022-10-06 DIAGNOSIS — G45.9 TIA (TRANSIENT ISCHEMIC ATTACK): ICD-10-CM

## 2022-10-06 DIAGNOSIS — Z00.00 ENCOUNTER FOR ANNUAL HEALTH EXAMINATION: ICD-10-CM

## 2022-10-06 DIAGNOSIS — Z23 NEED FOR VACCINATION: ICD-10-CM

## 2022-10-06 DIAGNOSIS — M25.50 ARTHRALGIA, UNSPECIFIED JOINT: ICD-10-CM

## 2022-10-06 DIAGNOSIS — K74.69 OTHER CIRRHOSIS OF LIVER (HCC): ICD-10-CM

## 2022-10-06 DIAGNOSIS — Z72.0 TOBACCO USE: ICD-10-CM

## 2022-10-06 DIAGNOSIS — R05.3 CHRONIC COUGH: ICD-10-CM

## 2022-10-06 DIAGNOSIS — R92.2 INCONCLUSIVE MAMMOGRAM: ICD-10-CM

## 2022-10-06 DIAGNOSIS — D69.6 THROMBOCYTOPENIA (HCC): ICD-10-CM

## 2022-10-06 DIAGNOSIS — R06.09 DYSPNEA ON EXERTION: ICD-10-CM

## 2022-10-06 DIAGNOSIS — R76.8 POSITIVE ANA (ANTINUCLEAR ANTIBODY): ICD-10-CM

## 2022-10-06 DIAGNOSIS — Z12.11 COLON CANCER SCREENING: ICD-10-CM

## 2022-10-06 DIAGNOSIS — Z23 FLU VACCINE NEED: ICD-10-CM

## 2022-10-06 DIAGNOSIS — Z78.0 POSTMENOPAUSAL: ICD-10-CM

## 2022-10-06 DIAGNOSIS — R92.8 ABNORMAL MAMMOGRAM: ICD-10-CM

## 2022-10-06 DIAGNOSIS — Z17.0 MALIGNANT NEOPLASM OF RIGHT BREAST IN FEMALE, ESTROGEN RECEPTOR POSITIVE, UNSPECIFIED SITE OF BREAST (HCC): ICD-10-CM

## 2022-10-06 DIAGNOSIS — I65.29 STENOSIS OF CAROTID ARTERY, UNSPECIFIED LATERALITY: ICD-10-CM

## 2022-10-06 DIAGNOSIS — Z13.6 SCREENING FOR CARDIOVASCULAR CONDITION: ICD-10-CM

## 2022-10-06 DIAGNOSIS — C50.911 MALIGNANT NEOPLASM OF RIGHT BREAST IN FEMALE, ESTROGEN RECEPTOR POSITIVE, UNSPECIFIED SITE OF BREAST (HCC): ICD-10-CM

## 2022-10-06 LAB
AFP-TM SERPL-MCNC: 4.7 NG/ML (ref ?–8)
ALBUMIN SERPL-MCNC: 3.8 G/DL (ref 3.4–5)
ALP LIVER SERPL-CCNC: 146 U/L
ALT SERPL-CCNC: 40 U/L
ANION GAP SERPL CALC-SCNC: 7 MMOL/L (ref 0–18)
AST SERPL-CCNC: 41 U/L (ref 15–37)
BASOPHILS # BLD AUTO: 0.04 X10(3) UL (ref 0–0.2)
BASOPHILS NFR BLD AUTO: 0.9 %
BILIRUB DIRECT SERPL-MCNC: 0.2 MG/DL (ref 0–0.2)
BILIRUB SERPL-MCNC: 0.6 MG/DL (ref 0.1–2)
BUN BLD-MCNC: 20 MG/DL (ref 7–18)
BUN/CREAT SERPL: 27.8 (ref 10–20)
CALCIUM BLD-MCNC: 9.1 MG/DL (ref 8.5–10.1)
CHLORIDE SERPL-SCNC: 110 MMOL/L (ref 98–112)
CHOLEST SERPL-MCNC: 195 MG/DL (ref ?–200)
CO2 SERPL-SCNC: 25 MMOL/L (ref 21–32)
CREAT BLD-MCNC: 0.72 MG/DL
DEPRECATED RDW RBC AUTO: 43.8 FL (ref 35.1–46.3)
EOSINOPHIL # BLD AUTO: 0.23 X10(3) UL (ref 0–0.7)
EOSINOPHIL NFR BLD AUTO: 5.5 %
ERYTHROCYTE [DISTWIDTH] IN BLOOD BY AUTOMATED COUNT: 12.6 % (ref 11–15)
ERYTHROCYTE [SEDIMENTATION RATE] IN BLOOD: 27 MM/HR
FASTING PATIENT LIPID ANSWER: YES
FASTING STATUS PATIENT QL REPORTED: YES
GFR SERPLBLD BASED ON 1.73 SQ M-ARVRAT: 93 ML/MIN/1.73M2 (ref 60–?)
GLUCOSE BLD-MCNC: 108 MG/DL (ref 70–99)
HCT VFR BLD AUTO: 46.1 %
HDLC SERPL-MCNC: 63 MG/DL (ref 40–59)
HGB BLD-MCNC: 14.7 G/DL
IMM GRANULOCYTES # BLD AUTO: 0.01 X10(3) UL (ref 0–1)
IMM GRANULOCYTES NFR BLD: 0.2 %
INR BLD: 1.11 (ref 0.85–1.16)
LDLC SERPL CALC-MCNC: 116 MG/DL (ref ?–100)
LYMPHOCYTES # BLD AUTO: 0.63 X10(3) UL (ref 1–4)
LYMPHOCYTES NFR BLD AUTO: 14.9 %
MCH RBC QN AUTO: 29.8 PG (ref 26–34)
MCHC RBC AUTO-ENTMCNC: 31.9 G/DL (ref 31–37)
MCV RBC AUTO: 93.5 FL
MONOCYTES # BLD AUTO: 0.33 X10(3) UL (ref 0.1–1)
MONOCYTES NFR BLD AUTO: 7.8 %
NEUTROPHILS # BLD AUTO: 2.98 X10 (3) UL (ref 1.5–7.7)
NEUTROPHILS # BLD AUTO: 2.98 X10(3) UL (ref 1.5–7.7)
NEUTROPHILS NFR BLD AUTO: 70.7 %
NONHDLC SERPL-MCNC: 132 MG/DL (ref ?–130)
OSMOLALITY SERPL CALC.SUM OF ELEC: 297 MOSM/KG (ref 275–295)
PLATELET # BLD AUTO: 98 10(3)UL (ref 150–450)
POTASSIUM SERPL-SCNC: 4.1 MMOL/L (ref 3.5–5.1)
PROT SERPL-MCNC: 7.7 G/DL (ref 6.4–8.2)
PROTHROMBIN TIME: 14.2 SECONDS (ref 11.6–14.8)
RBC # BLD AUTO: 4.93 X10(6)UL
RHEUMATOID FACT SERPL-ACNC: <10 IU/ML (ref ?–15)
SODIUM SERPL-SCNC: 142 MMOL/L (ref 136–145)
TRIGL SERPL-MCNC: 89 MG/DL (ref 30–149)
TSI SER-ACNC: 0.86 MIU/ML (ref 0.36–3.74)
VLDLC SERPL CALC-MCNC: 15 MG/DL (ref 0–30)
WBC # BLD AUTO: 4.2 X10(3) UL (ref 4–11)

## 2022-10-06 PROCEDURE — 80076 HEPATIC FUNCTION PANEL: CPT | Performed by: FAMILY MEDICINE

## 2022-10-06 PROCEDURE — 85610 PROTHROMBIN TIME: CPT | Performed by: FAMILY MEDICINE

## 2022-10-06 PROCEDURE — 85025 COMPLETE CBC W/AUTO DIFF WBC: CPT | Performed by: FAMILY MEDICINE

## 2022-10-06 PROCEDURE — 86431 RHEUMATOID FACTOR QUANT: CPT | Performed by: FAMILY MEDICINE

## 2022-10-06 PROCEDURE — 80048 BASIC METABOLIC PNL TOTAL CA: CPT | Performed by: FAMILY MEDICINE

## 2022-10-06 PROCEDURE — 80061 LIPID PANEL: CPT | Performed by: FAMILY MEDICINE

## 2022-10-06 PROCEDURE — 82105 ALPHA-FETOPROTEIN SERUM: CPT | Performed by: FAMILY MEDICINE

## 2022-10-06 PROCEDURE — 84443 ASSAY THYROID STIM HORMONE: CPT | Performed by: FAMILY MEDICINE

## 2022-10-06 PROCEDURE — 85652 RBC SED RATE AUTOMATED: CPT | Performed by: FAMILY MEDICINE

## 2022-10-26 NOTE — ADDENDUM NOTE
Addended by: Mauri Colin on: 10/6/2022 07:54 PM     Modules accepted: Orders From: Tunde Joyner  To: Anthony Lopez  Sent: 10/26/2022 9:18 AM CDT  Subject: Prescription Refill     About a year and half ago , you prescribed me 500 mg Valacyclovir for occasional cold sores . It is not on my medication list and I need to get it refilled . At the  [facility] that is listed in my chart

## 2022-11-02 DIAGNOSIS — E03.9 HYPOTHYROIDISM, UNSPECIFIED TYPE: ICD-10-CM

## 2022-11-02 RX ORDER — LEVOTHYROXINE SODIUM 0.12 MG/1
TABLET ORAL
Qty: 90 TABLET | Refills: 0 | Status: SHIPPED | OUTPATIENT
Start: 2022-11-02

## 2022-11-03 ENCOUNTER — HOSPITAL ENCOUNTER (OUTPATIENT)
Dept: CT IMAGING | Age: 67
Discharge: HOME | End: 2022-11-03
Attending: FAMILY MEDICINE
Payer: MEDICARE

## 2022-11-03 DIAGNOSIS — R05.3 CHRONIC COUGH: ICD-10-CM

## 2022-11-03 DIAGNOSIS — F17.200 SMOKER: ICD-10-CM

## 2022-11-03 DIAGNOSIS — Z85.3 HISTORY OF CANCER OF RIGHT BREAST: ICD-10-CM

## 2022-11-03 DIAGNOSIS — R06.02 SHORTNESS OF BREATH: ICD-10-CM

## 2022-11-03 PROCEDURE — 71250 CT THORAX DX C-: CPT | Performed by: FAMILY MEDICINE

## 2022-11-08 PROBLEM — J43.2 CENTRILOBULAR EMPHYSEMA (HCC): Status: ACTIVE | Noted: 2022-11-08

## 2022-11-10 ENCOUNTER — HOSPITAL ENCOUNTER (OUTPATIENT)
Dept: ULTRASOUND IMAGING | Age: 67
Discharge: HOME | End: 2022-11-10
Attending: INTERNAL MEDICINE
Payer: MEDICARE

## 2022-11-10 ENCOUNTER — APPOINTMENT (OUTPATIENT)
Dept: MAMMOGRAPHY | Facility: HOSPITAL | Age: 67
End: 2022-11-10
Attending: FAMILY MEDICINE
Payer: MEDICARE

## 2022-11-10 ENCOUNTER — OFFICE VISIT (OUTPATIENT)
Dept: INTERNAL MEDICINE CLINIC | Facility: CLINIC | Age: 67
End: 2022-11-10
Payer: MEDICARE

## 2022-11-10 VITALS
SYSTOLIC BLOOD PRESSURE: 136 MMHG | RESPIRATION RATE: 17 BRPM | OXYGEN SATURATION: 98 % | BODY MASS INDEX: 37.89 KG/M2 | WEIGHT: 193 LBS | HEIGHT: 60 IN | DIASTOLIC BLOOD PRESSURE: 80 MMHG | HEART RATE: 91 BPM

## 2022-11-10 DIAGNOSIS — M25.561 CHRONIC PAIN OF BOTH KNEES: ICD-10-CM

## 2022-11-10 DIAGNOSIS — R06.02 SHORTNESS OF BREATH: ICD-10-CM

## 2022-11-10 DIAGNOSIS — R07.9 CHEST PAIN, UNSPECIFIED TYPE: ICD-10-CM

## 2022-11-10 DIAGNOSIS — R35.0 URINARY FREQUENCY: ICD-10-CM

## 2022-11-10 DIAGNOSIS — K74.60 CIRRHOSIS OF LIVER WITHOUT ASCITES, UNSPECIFIED HEPATIC CIRRHOSIS TYPE (HCC): ICD-10-CM

## 2022-11-10 DIAGNOSIS — Z86.73 HISTORY OF TRANSIENT ISCHEMIC ATTACK (TIA): ICD-10-CM

## 2022-11-10 DIAGNOSIS — E03.9 HYPOTHYROIDISM, UNSPECIFIED TYPE: ICD-10-CM

## 2022-11-10 DIAGNOSIS — M25.562 CHRONIC PAIN OF BOTH KNEES: ICD-10-CM

## 2022-11-10 DIAGNOSIS — J43.2 CENTRILOBULAR EMPHYSEMA (HCC): Primary | ICD-10-CM

## 2022-11-10 DIAGNOSIS — G45.9 TIA (TRANSIENT ISCHEMIC ATTACK): ICD-10-CM

## 2022-11-10 DIAGNOSIS — E78.5 HYPERLIPIDEMIA, UNSPECIFIED HYPERLIPIDEMIA TYPE: ICD-10-CM

## 2022-11-10 DIAGNOSIS — Z72.0 TOBACCO USE: ICD-10-CM

## 2022-11-10 DIAGNOSIS — G89.29 CHRONIC PAIN OF BOTH KNEES: ICD-10-CM

## 2022-11-10 DIAGNOSIS — D69.6 THROMBOCYTOPENIA (HCC): ICD-10-CM

## 2022-11-10 LAB
BILIRUB UR QL: NEGATIVE
CLARITY UR: CLEAR
COLOR UR: YELLOW
GLUCOSE UR-MCNC: NEGATIVE MG/DL
HGB UR QL STRIP.AUTO: NEGATIVE
KETONES UR-MCNC: NEGATIVE MG/DL
NITRITE UR QL STRIP.AUTO: NEGATIVE
PH UR: 5 [PH] (ref 5–8)
PROT UR-MCNC: 30 MG/DL
SP GR UR STRIP: 1.03 (ref 1–1.03)
UROBILINOGEN UR STRIP-ACNC: <2
VIT C UR-MCNC: NEGATIVE MG/DL

## 2022-11-10 PROCEDURE — 76705 ECHO EXAM OF ABDOMEN: CPT | Performed by: INTERNAL MEDICINE

## 2022-11-10 PROCEDURE — 99215 OFFICE O/P EST HI 40 MIN: CPT | Performed by: FAMILY MEDICINE

## 2022-11-10 PROCEDURE — 87086 URINE CULTURE/COLONY COUNT: CPT | Performed by: FAMILY MEDICINE

## 2022-11-10 PROCEDURE — 93000 ELECTROCARDIOGRAM COMPLETE: CPT | Performed by: FAMILY MEDICINE

## 2022-11-10 PROCEDURE — 81001 URINALYSIS AUTO W/SCOPE: CPT | Performed by: FAMILY MEDICINE

## 2022-11-10 PROCEDURE — 99406 BEHAV CHNG SMOKING 3-10 MIN: CPT | Performed by: FAMILY MEDICINE

## 2022-11-10 RX ORDER — ATORVASTATIN CALCIUM 20 MG/1
20 TABLET, FILM COATED ORAL NIGHTLY
Qty: 90 TABLET | Refills: 0 | Status: SHIPPED | OUTPATIENT
Start: 2022-11-10

## 2022-11-10 RX ORDER — BUDESONIDE AND FORMOTEROL FUMARATE DIHYDRATE 160; 4.5 UG/1; UG/1
1 AEROSOL RESPIRATORY (INHALATION) 2 TIMES DAILY
Qty: 1 EACH | Refills: 1 | Status: SHIPPED | OUTPATIENT
Start: 2022-11-10

## 2022-11-10 RX ORDER — BUPROPION HYDROCHLORIDE 150 MG/1
150 TABLET, EXTENDED RELEASE ORAL 2 TIMES DAILY
Qty: 60 TABLET | Refills: 1 | Status: SHIPPED | OUTPATIENT
Start: 2022-11-10

## 2022-11-16 ENCOUNTER — TELEPHONE (OUTPATIENT)
Dept: GASTROENTEROLOGY | Facility: CLINIC | Age: 67
End: 2022-11-16

## 2022-11-16 DIAGNOSIS — K74.60 CIRRHOSIS OF LIVER WITHOUT ASCITES, UNSPECIFIED HEPATIC CIRRHOSIS TYPE (HCC): Primary | ICD-10-CM

## 2022-11-17 ENCOUNTER — HOSPITAL ENCOUNTER (OUTPATIENT)
Dept: MAMMOGRAPHY | Facility: HOSPITAL | Age: 67
Discharge: HOME | End: 2022-11-17
Attending: FAMILY MEDICINE
Payer: MEDICARE

## 2022-11-17 DIAGNOSIS — R92.8 ABNORMAL MAMMOGRAM: ICD-10-CM

## 2022-11-17 DIAGNOSIS — Z12.31 ENCOUNTER FOR SCREENING MAMMOGRAM FOR MALIGNANT NEOPLASM OF BREAST: ICD-10-CM

## 2022-11-17 PROCEDURE — 77061 BREAST TOMOSYNTHESIS UNI: CPT | Performed by: FAMILY MEDICINE

## 2022-11-17 PROCEDURE — 77065 DX MAMMO INCL CAD UNI: CPT | Performed by: FAMILY MEDICINE

## 2022-11-17 NOTE — TELEPHONE ENCOUNTER
----- Message from Broadus Ahumada, MD sent at 11/16/2022  5:35 PM CST -----  Liver ultrasound shows no mass or lesion, changes of cirrhosis noted     Repeat ultrasound in 6 months time.

## 2022-11-17 NOTE — TELEPHONE ENCOUNTER
6 month liver ultrasound recall entered into patient outreach in [address] [place] [address] Rd. Next due 5/10/2023.

## 2022-11-21 ENCOUNTER — HOSPITAL ENCOUNTER (OUTPATIENT)
Dept: GENERAL RADIOLOGY | Facility: HOSPITAL | Age: 67
Discharge: HOME | End: 2022-11-21
Attending: INTERNAL MEDICINE
Payer: MEDICARE

## 2022-11-21 ENCOUNTER — OFFICE VISIT (OUTPATIENT)
Dept: RHEUMATOLOGY | Facility: CLINIC | Age: 67
End: 2022-11-21
Payer: MEDICARE

## 2022-11-21 ENCOUNTER — LAB ENCOUNTER (OUTPATIENT)
Dept: LAB | Facility: HOSPITAL | Age: 67
End: 2022-11-21
Attending: INTERNAL MEDICINE
Payer: MEDICARE

## 2022-11-21 ENCOUNTER — HOSPITAL ENCOUNTER (OUTPATIENT)
Dept: GENERAL RADIOLOGY | Facility: HOSPITAL | Age: 67
Discharge: HOME | End: 2022-11-21
Attending: FAMILY MEDICINE
Payer: MEDICARE

## 2022-11-21 VITALS
HEART RATE: 99 BPM | DIASTOLIC BLOOD PRESSURE: 82 MMHG | HEIGHT: 60 IN | SYSTOLIC BLOOD PRESSURE: 157 MMHG | WEIGHT: 193 LBS | BODY MASS INDEX: 37.89 KG/M2

## 2022-11-21 DIAGNOSIS — E78.5 HYPERLIPIDEMIA, UNSPECIFIED HYPERLIPIDEMIA TYPE: ICD-10-CM

## 2022-11-21 DIAGNOSIS — G89.29 CHRONIC PAIN OF BOTH KNEES: ICD-10-CM

## 2022-11-21 DIAGNOSIS — R76.8 POSITIVE ANA (ANTINUCLEAR ANTIBODY): ICD-10-CM

## 2022-11-21 DIAGNOSIS — M25.561 CHRONIC PAIN OF BOTH KNEES: ICD-10-CM

## 2022-11-21 DIAGNOSIS — M25.562 CHRONIC PAIN OF BOTH KNEES: ICD-10-CM

## 2022-11-21 DIAGNOSIS — M25.50 POLYARTHRALGIA: ICD-10-CM

## 2022-11-21 DIAGNOSIS — R76.8 POSITIVE ANA (ANTINUCLEAR ANTIBODY): Primary | ICD-10-CM

## 2022-11-21 DIAGNOSIS — R31.29 OTHER MICROSCOPIC HEMATURIA: ICD-10-CM

## 2022-11-21 LAB
ALBUMIN SERPL-MCNC: 4.1 G/DL (ref 3.4–5)
ALBUMIN/GLOB SERPL: 0.9 {RATIO} (ref 1–2)
ALP LIVER SERPL-CCNC: 174 U/L
ALT SERPL-CCNC: 43 U/L
ANION GAP SERPL CALC-SCNC: 7 MMOL/L (ref 0–18)
AST SERPL-CCNC: 42 U/L (ref 15–37)
BILIRUB SERPL-MCNC: 0.6 MG/DL (ref 0.1–2)
BUN BLD-MCNC: 19 MG/DL (ref 7–18)
BUN/CREAT SERPL: 29.2 (ref 10–20)
CALCIUM BLD-MCNC: 9.7 MG/DL (ref 8.5–10.1)
CHLORIDE SERPL-SCNC: 107 MMOL/L (ref 98–112)
CHOLEST SERPL-MCNC: 192 MG/DL (ref ?–200)
CO2 SERPL-SCNC: 27 MMOL/L (ref 21–32)
CREAT BLD-MCNC: 0.65 MG/DL
CREAT UR-SCNC: 145 MG/DL
CRP SERPL-MCNC: 0.36 MG/DL (ref ?–0.3)
ERYTHROCYTE [SEDIMENTATION RATE] IN BLOOD: 31 MM/HR
FASTING PATIENT LIPID ANSWER: YES
FASTING STATUS PATIENT QL REPORTED: YES
GFR SERPLBLD BASED ON 1.73 SQ M-ARVRAT: 98 ML/MIN/1.73M2 (ref 60–?)
GLOBULIN PLAS-MCNC: 4.4 G/DL (ref 2.8–4.4)
GLUCOSE BLD-MCNC: 76 MG/DL (ref 70–99)
HDLC SERPL-MCNC: 68 MG/DL (ref 40–59)
LDLC SERPL CALC-MCNC: 108 MG/DL (ref ?–100)
NONHDLC SERPL-MCNC: 124 MG/DL (ref ?–130)
OSMOLALITY SERPL CALC.SUM OF ELEC: 293 MOSM/KG (ref 275–295)
POTASSIUM SERPL-SCNC: 3.8 MMOL/L (ref 3.5–5.1)
PROT SERPL-MCNC: 8.5 G/DL (ref 6.4–8.2)
PROT UR-MCNC: 7.3 MG/DL
PROT/CREAT UR-RTO: 0.05
SODIUM SERPL-SCNC: 141 MMOL/L (ref 136–145)
TRIGL SERPL-MCNC: 88 MG/DL (ref 30–149)
VLDLC SERPL CALC-MCNC: 15 MG/DL (ref 0–30)

## 2022-11-21 PROCEDURE — 85652 RBC SED RATE AUTOMATED: CPT | Performed by: INTERNAL MEDICINE

## 2022-11-21 PROCEDURE — 82570 ASSAY OF URINE CREATININE: CPT

## 2022-11-21 PROCEDURE — 80053 COMPREHEN METABOLIC PANEL: CPT

## 2022-11-21 PROCEDURE — 86200 CCP ANTIBODY: CPT | Performed by: INTERNAL MEDICINE

## 2022-11-21 PROCEDURE — 73630 X-RAY EXAM OF FOOT: CPT | Performed by: INTERNAL MEDICINE

## 2022-11-21 PROCEDURE — 73564 X-RAY EXAM KNEE 4 OR MORE: CPT | Performed by: FAMILY MEDICINE

## 2022-11-21 PROCEDURE — 80061 LIPID PANEL: CPT

## 2022-11-21 PROCEDURE — 81003 URINALYSIS AUTO W/O SCOPE: CPT

## 2022-11-21 PROCEDURE — 84156 ASSAY OF PROTEIN URINE: CPT

## 2022-11-21 PROCEDURE — 86235 NUCLEAR ANTIGEN ANTIBODY: CPT

## 2022-11-21 PROCEDURE — 99204 OFFICE O/P NEW MOD 45 MIN: CPT | Performed by: INTERNAL MEDICINE

## 2022-11-21 PROCEDURE — 86140 C-REACTIVE PROTEIN: CPT | Performed by: INTERNAL MEDICINE

## 2022-11-21 PROCEDURE — 86225 DNA ANTIBODY NATIVE: CPT

## 2022-11-21 PROCEDURE — 36415 COLL VENOUS BLD VENIPUNCTURE: CPT | Performed by: INTERNAL MEDICINE

## 2022-11-21 PROCEDURE — 81374 HLA I TYPING 1 ANTIGEN LR: CPT

## 2022-11-21 NOTE — PATIENT INSTRUCTIONS
You were seen for joint pain and a positive LADONNA  Lets get blood work and x-rays today  X-rays of the feet and the knees  Come back in about 1 to 2 weeks we discussed results

## 2022-11-22 LAB
BILIRUB UR QL: NEGATIVE
CLARITY UR: CLEAR
ENA RNP IGG SER IA-ACNC: 0.6 U/ML
ENA SM IGG SER IA-ACNC: <0.7 U/ML
ENA SS-A IGG SER IA-ACNC: 0.5 U/ML
ENA SS-B IGG SER IA-ACNC: 0.5 U/ML
GLUCOSE UR-MCNC: NEGATIVE MG/DL
HGB UR QL STRIP.AUTO: NEGATIVE
KETONES UR-MCNC: NEGATIVE MG/DL
LEUKOCYTE ESTERASE UR QL STRIP.AUTO: NEGATIVE
NITRITE UR QL STRIP.AUTO: NEGATIVE
PH UR: 5.5 [PH] (ref 5–8)
PROT UR-MCNC: NEGATIVE MG/DL
SP GR UR STRIP: >=1.03 (ref 1–1.03)
U1 SNRNP IGG SER IA-ACNC: 1.5 U/ML
UROBILINOGEN UR STRIP-ACNC: 0.2

## 2022-11-23 LAB
CCP IGG SERPL-ACNC: 1.1 U/ML (ref 0–6.9)
HLA-B27: NEGATIVE

## 2022-11-25 LAB — DSDNA AB TITR SER: <10 {TITER}

## 2022-11-28 ENCOUNTER — HOSPITAL ENCOUNTER (OUTPATIENT)
Dept: BONE DENSITY | Age: 67
Discharge: HOME | End: 2022-11-28
Attending: FAMILY MEDICINE
Payer: MEDICARE

## 2022-11-28 DIAGNOSIS — Z78.0 POSTMENOPAUSAL: ICD-10-CM

## 2022-11-28 PROCEDURE — 77080 DXA BONE DENSITY AXIAL: CPT | Performed by: FAMILY MEDICINE

## 2022-11-29 PROBLEM — M85.89 OSTEOPENIA OF MULTIPLE SITES: Status: ACTIVE | Noted: 2022-11-29

## 2022-12-05 ENCOUNTER — OFFICE VISIT (OUTPATIENT)
Dept: RHEUMATOLOGY | Facility: CLINIC | Age: 67
End: 2022-12-05
Payer: MEDICARE

## 2022-12-05 VITALS
WEIGHT: 193 LBS | HEIGHT: 60 IN | SYSTOLIC BLOOD PRESSURE: 140 MMHG | DIASTOLIC BLOOD PRESSURE: 84 MMHG | BODY MASS INDEX: 37.89 KG/M2 | HEART RATE: 90 BPM

## 2022-12-05 DIAGNOSIS — R76.8 POSITIVE ANA (ANTINUCLEAR ANTIBODY): Primary | ICD-10-CM

## 2022-12-05 DIAGNOSIS — M25.50 POLYARTHRALGIA: ICD-10-CM

## 2022-12-05 PROCEDURE — 99214 OFFICE O/P EST MOD 30 MIN: CPT | Performed by: INTERNAL MEDICINE

## 2022-12-05 RX ORDER — MELOXICAM 7.5 MG/1
7.5 TABLET ORAL 2 TIMES DAILY
Qty: 60 TABLET | Refills: 0 | Status: SHIPPED | OUTPATIENT
Start: 2022-12-05

## 2022-12-05 NOTE — PATIENT INSTRUCTIONS
You were seen today for joint pain  Work-up for autoimmune diseases were negative  Plan to start her meloxicam twice a day with food.   Do not take with Aleve or Motrin ibuprofen  May have to inject her knees at her next visit  If you continue to have pain we may have to repeat blood work at the next visit

## 2022-12-13 ENCOUNTER — HOSPITAL ENCOUNTER (OUTPATIENT)
Age: 67
Discharge: HOME | End: 2022-12-13
Payer: MEDICARE

## 2022-12-13 ENCOUNTER — APPOINTMENT (OUTPATIENT)
Dept: GENERAL RADIOLOGY | Age: 67
End: 2022-12-13
Payer: MEDICARE

## 2022-12-13 ENCOUNTER — APPOINTMENT (OUTPATIENT)
Dept: ULTRASOUND IMAGING | Age: 67
End: 2022-12-13
Payer: MEDICARE

## 2022-12-13 VITALS
DIASTOLIC BLOOD PRESSURE: 45 MMHG | SYSTOLIC BLOOD PRESSURE: 145 MMHG | TEMPERATURE: 98 F | RESPIRATION RATE: 18 BRPM | HEART RATE: 86 BPM | OXYGEN SATURATION: 99 %

## 2022-12-13 DIAGNOSIS — M25.561 CHRONIC PAIN OF RIGHT KNEE: Primary | ICD-10-CM

## 2022-12-13 DIAGNOSIS — G89.29 CHRONIC PAIN OF RIGHT KNEE: Primary | ICD-10-CM

## 2022-12-13 PROCEDURE — 99213 OFFICE O/P EST LOW 20 MIN: CPT

## 2022-12-13 PROCEDURE — 93971 EXTREMITY STUDY: CPT

## 2022-12-13 PROCEDURE — 99215 OFFICE O/P EST HI 40 MIN: CPT

## 2022-12-13 PROCEDURE — 73590 X-RAY EXAM OF LOWER LEG: CPT

## 2022-12-13 PROCEDURE — 73562 X-RAY EXAM OF KNEE 3: CPT

## 2022-12-13 NOTE — DISCHARGE INSTRUCTIONS
The x-rays were negative. Your ultrasound showed a small fluid collection in the knee, this may be a small effusion or a Baker's cyst.  You should follow-up with a orthopedic surgeon to discuss further treatment if needed. You should also make an appointment to follow-up with your primary care provider. If you develop any redness, swelling, fever or any acutely worsening complaints you could go to the emergency department. Otherwise please sure to follow-up with your primary care provider.

## 2022-12-13 NOTE — ED INITIAL ASSESSMENT (HPI)
Acute on chronic r knee pain with swelling since sept, was seen by specialist, had imaging done, denies numbness or tingling, no calf pain, diff ambulating, denies recent trauma or injury

## 2022-12-15 ENCOUNTER — OFFICE VISIT (OUTPATIENT)
Dept: ORTHOPEDICS CLINIC | Facility: CLINIC | Age: 67
End: 2022-12-15
Payer: MEDICARE

## 2022-12-15 VITALS
DIASTOLIC BLOOD PRESSURE: 76 MMHG | WEIGHT: 194 LBS | SYSTOLIC BLOOD PRESSURE: 145 MMHG | HEART RATE: 82 BPM | BODY MASS INDEX: 38.09 KG/M2 | HEIGHT: 60 IN

## 2022-12-15 DIAGNOSIS — M17.11 PRIMARY OSTEOARTHRITIS OF RIGHT KNEE: Primary | ICD-10-CM

## 2022-12-15 PROCEDURE — 20610 DRAIN/INJ JOINT/BURSA W/O US: CPT | Performed by: ORTHOPAEDIC SURGERY

## 2022-12-15 PROCEDURE — 99204 OFFICE O/P NEW MOD 45 MIN: CPT | Performed by: ORTHOPAEDIC SURGERY

## 2022-12-15 RX ORDER — TRIAMCINOLONE ACETONIDE 40 MG/ML
40 INJECTION, SUSPENSION INTRA-ARTICULAR; INTRAMUSCULAR ONCE
Status: COMPLETED | OUTPATIENT
Start: 2022-12-15 | End: 2022-12-15

## 2022-12-15 RX ADMIN — TRIAMCINOLONE ACETONIDE 40 MG: 40 INJECTION, SUSPENSION INTRA-ARTICULAR; INTRAMUSCULAR at 13:06:00

## 2022-12-15 NOTE — PROGRESS NOTES
Per verbal order from Dr. Pricilla Castro draw up 3ml of 0.5% Marcaine & 2ml 1% lidocaine and 1ml of Kenalog 40 for cortisone injection to right knee. Telma Vargas    Patient provided education handout for cortisone injection.

## 2023-01-19 ENCOUNTER — OFFICE VISIT (OUTPATIENT)
Dept: ORTHOPEDICS CLINIC | Facility: CLINIC | Age: 68
End: 2023-01-19

## 2023-01-19 DIAGNOSIS — S83.241D TEAR OF MEDIAL MENISCUS OF RIGHT KNEE, CURRENT, UNSPECIFIED TEAR TYPE, SUBSEQUENT ENCOUNTER: Primary | ICD-10-CM

## 2023-01-19 PROCEDURE — 99213 OFFICE O/P EST LOW 20 MIN: CPT | Performed by: ORTHOPAEDIC SURGERY

## 2023-01-30 ENCOUNTER — HOSPITAL ENCOUNTER (OUTPATIENT)
Dept: MRI IMAGING | Age: 68
Discharge: HOME | End: 2023-01-30
Attending: ORTHOPAEDIC SURGERY
Payer: MEDICARE

## 2023-01-30 DIAGNOSIS — S83.241D TEAR OF MEDIAL MENISCUS OF RIGHT KNEE, CURRENT, UNSPECIFIED TEAR TYPE, SUBSEQUENT ENCOUNTER: ICD-10-CM

## 2023-01-30 PROCEDURE — 73721 MRI JNT OF LWR EXTRE W/O DYE: CPT | Performed by: ORTHOPAEDIC SURGERY

## 2023-02-10 DIAGNOSIS — E03.9 HYPOTHYROIDISM, UNSPECIFIED TYPE: ICD-10-CM

## 2023-02-11 RX ORDER — LEVOTHYROXINE SODIUM 0.12 MG/1
TABLET ORAL
Qty: 90 TABLET | Refills: 0 | Status: SHIPPED | OUTPATIENT
Start: 2023-02-11

## 2023-03-02 ENCOUNTER — OFFICE VISIT (OUTPATIENT)
Dept: ORTHOPEDICS CLINIC | Facility: CLINIC | Age: 68
End: 2023-03-02

## 2023-03-02 DIAGNOSIS — M17.11 PRIMARY OSTEOARTHRITIS OF RIGHT KNEE: ICD-10-CM

## 2023-03-02 DIAGNOSIS — S83.241D TEAR OF MEDIAL MENISCUS OF RIGHT KNEE, CURRENT, UNSPECIFIED TEAR TYPE, SUBSEQUENT ENCOUNTER: Primary | ICD-10-CM

## 2023-03-02 PROCEDURE — 99213 OFFICE O/P EST LOW 20 MIN: CPT | Performed by: ORTHOPAEDIC SURGERY

## 2023-03-08 ENCOUNTER — TELEPHONE (OUTPATIENT)
Dept: INTERNAL MEDICINE CLINIC | Facility: CLINIC | Age: 68
End: 2023-03-08

## 2023-03-08 NOTE — TELEPHONE ENCOUNTER
Patient calling she states she had a mastectomy 15 years ago and she has a graft site. She has developed a lesion on the site. Patient states it has been going on for about a month. Started using Cortisone but has not gone away. No drainage, but there is a hole that keeps getting bigger. No available appointments.

## 2023-03-09 ENCOUNTER — TELEPHONE (OUTPATIENT)
Dept: ORTHOPEDICS CLINIC | Facility: CLINIC | Age: 68
End: 2023-03-09

## 2023-03-09 NOTE — TELEPHONE ENCOUNTER
Spoke with patient to inform her that I have sent a a request for surgery dates. As soon as I have them available, I will give her a call.

## 2023-03-13 ENCOUNTER — TELEPHONE (OUTPATIENT)
Dept: ORTHOPEDICS CLINIC | Facility: CLINIC | Age: 68
End: 2023-03-13

## 2023-03-14 ENCOUNTER — TELEPHONE (OUTPATIENT)
Dept: ORTHOPEDICS CLINIC | Facility: CLINIC | Age: 68
End: 2023-03-14

## 2023-03-14 DIAGNOSIS — S83.241D TEAR OF MEDIAL MENISCUS OF RIGHT KNEE, CURRENT, UNSPECIFIED TEAR TYPE, SUBSEQUENT ENCOUNTER: Primary | ICD-10-CM

## 2023-03-14 NOTE — TELEPHONE ENCOUNTER
Type of surgery:  Right knee arthroscopy, possible partial meniscectomy vs meniscal repair, possible debridement  Date: 5/1/23  Location: [facility]  Medical Clearance:      *Medical: Yes      *Dental: No      *Other:  Prior Authorization Status: Pending  Workers Comp:  Medacta/Hermila:  [place]: Yes  POV: 5/10/23

## 2023-03-15 ENCOUNTER — OFFICE VISIT (OUTPATIENT)
Dept: INTERNAL MEDICINE CLINIC | Facility: CLINIC | Age: 68
End: 2023-03-15
Payer: MEDICARE

## 2023-03-15 VITALS
BODY MASS INDEX: 38.09 KG/M2 | WEIGHT: 194 LBS | OXYGEN SATURATION: 98 % | HEART RATE: 74 BPM | HEIGHT: 60 IN | SYSTOLIC BLOOD PRESSURE: 134 MMHG | DIASTOLIC BLOOD PRESSURE: 78 MMHG

## 2023-03-15 DIAGNOSIS — Z87.891 SMOKING HISTORY: ICD-10-CM

## 2023-03-15 DIAGNOSIS — Z01.818 PRE-OP EXAM: ICD-10-CM

## 2023-03-15 DIAGNOSIS — L98.9 SKIN LESION: ICD-10-CM

## 2023-03-15 DIAGNOSIS — R06.02 SHORTNESS OF BREATH: ICD-10-CM

## 2023-03-15 DIAGNOSIS — L03.90 CELLULITIS, UNSPECIFIED CELLULITIS SITE: ICD-10-CM

## 2023-03-15 DIAGNOSIS — Z85.3 HISTORY OF BREAST CANCER: Primary | ICD-10-CM

## 2023-03-15 PROCEDURE — 99215 OFFICE O/P EST HI 40 MIN: CPT | Performed by: FAMILY MEDICINE

## 2023-03-15 RX ORDER — DOXYCYCLINE HYCLATE 100 MG
100 TABLET ORAL 2 TIMES DAILY
Qty: 20 TABLET | Refills: 0 | Status: SHIPPED | OUTPATIENT
Start: 2023-03-15

## 2023-03-22 ENCOUNTER — OFFICE VISIT (OUTPATIENT)
Dept: SURGERY | Facility: CLINIC | Age: 68
End: 2023-03-22
Payer: MEDICARE

## 2023-03-22 VITALS
DIASTOLIC BLOOD PRESSURE: 64 MMHG | BODY MASS INDEX: 38.09 KG/M2 | WEIGHT: 194 LBS | HEIGHT: 60 IN | HEART RATE: 85 BPM | TEMPERATURE: 98 F | SYSTOLIC BLOOD PRESSURE: 163 MMHG | RESPIRATION RATE: 16 BRPM | OXYGEN SATURATION: 96 %

## 2023-03-22 DIAGNOSIS — R22.2 CHEST MASS: ICD-10-CM

## 2023-03-22 DIAGNOSIS — C50.911 MALIGNANT NEOPLASM OF RIGHT BREAST IN FEMALE, ESTROGEN RECEPTOR POSITIVE, UNSPECIFIED SITE OF BREAST (HCC): Primary | ICD-10-CM

## 2023-03-22 DIAGNOSIS — Z17.0 MALIGNANT NEOPLASM OF RIGHT BREAST IN FEMALE, ESTROGEN RECEPTOR POSITIVE, UNSPECIFIED SITE OF BREAST (HCC): Primary | ICD-10-CM

## 2023-03-22 PROCEDURE — 88305 TISSUE EXAM BY PATHOLOGIST: CPT | Performed by: SURGERY

## 2023-03-22 PROCEDURE — 87205 SMEAR GRAM STAIN: CPT | Performed by: SURGERY

## 2023-03-22 PROCEDURE — 87186 SC STD MICRODIL/AGAR DIL: CPT | Performed by: SURGERY

## 2023-03-22 PROCEDURE — 87070 CULTURE OTHR SPECIMN AEROBIC: CPT | Performed by: SURGERY

## 2023-03-22 PROCEDURE — 99204 OFFICE O/P NEW MOD 45 MIN: CPT | Performed by: SURGERY

## 2023-03-22 PROCEDURE — 87077 CULTURE AEROBIC IDENTIFY: CPT | Performed by: SURGERY

## 2023-03-23 ENCOUNTER — TELEPHONE (OUTPATIENT)
Dept: SURGERY | Facility: CLINIC | Age: 68
End: 2023-03-23

## 2023-03-23 DIAGNOSIS — C50.911 MALIGNANT NEOPLASM OF RIGHT BREAST IN FEMALE, ESTROGEN RECEPTOR POSITIVE, UNSPECIFIED SITE OF BREAST (HCC): ICD-10-CM

## 2023-03-23 DIAGNOSIS — Z17.0 MALIGNANT NEOPLASM OF RIGHT BREAST IN FEMALE, ESTROGEN RECEPTOR POSITIVE, UNSPECIFIED SITE OF BREAST (HCC): ICD-10-CM

## 2023-03-23 DIAGNOSIS — R22.2 CHEST MASS: Primary | ICD-10-CM

## 2023-03-23 NOTE — TELEPHONE ENCOUNTER
Called patient to inform that per Dr Sailaja Rasmussen \"inform the patient that these pathology results were benign and we will help get her set up to see the wound care specialist\". Patient verbalized understanding.    -------------  Update 1400: Spoke with [facility] from [facility]. She will be reaching out to the patient to schedule an appointment.

## 2023-03-30 ENCOUNTER — HOSPITAL ENCOUNTER (OUTPATIENT)
Dept: GENERAL RADIOLOGY | Facility: HOSPITAL | Age: 68
Discharge: HOME | End: 2023-03-30
Attending: NURSE PRACTITIONER
Payer: MEDICARE

## 2023-03-30 ENCOUNTER — OFFICE VISIT (OUTPATIENT)
Dept: WOUND CARE | Facility: HOSPITAL | Age: 68
End: 2023-03-30
Attending: NURSE PRACTITIONER
Payer: MEDICARE

## 2023-03-30 VITALS
SYSTOLIC BLOOD PRESSURE: 164 MMHG | BODY MASS INDEX: 38.09 KG/M2 | HEIGHT: 60 IN | DIASTOLIC BLOOD PRESSURE: 84 MMHG | RESPIRATION RATE: 20 BRPM | WEIGHT: 194 LBS | OXYGEN SATURATION: 95 % | TEMPERATURE: 100 F | HEART RATE: 101 BPM

## 2023-03-30 DIAGNOSIS — L59.8 RADIATION-INDUCED FIBROSIS OF SOFT TISSUE FROM THERAPEUTIC PROCEDURE: ICD-10-CM

## 2023-03-30 DIAGNOSIS — S21.101A OPEN CHEST WOUND, RIGHT, INITIAL ENCOUNTER: Primary | ICD-10-CM

## 2023-03-30 DIAGNOSIS — Z85.3 HISTORY OF BREAST CANCER: ICD-10-CM

## 2023-03-30 DIAGNOSIS — S21.101A OPEN CHEST WOUND, RIGHT, INITIAL ENCOUNTER: ICD-10-CM
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
## 2023-03-30 DIAGNOSIS — Z90.11 STATUS POST RIGHT MASTECTOMY: ICD-10-CM

## 2023-03-30 PROCEDURE — 99215 OFFICE O/P EST HI 40 MIN: CPT | Performed by: NURSE PRACTITIONER

## 2023-03-30 PROCEDURE — 71046 X-RAY EXAM CHEST 2 VIEWS: CPT | Performed by: NURSE PRACTITIONER

## 2023-04-03 ENCOUNTER — OFFICE VISIT (OUTPATIENT)
Dept: INTERNAL MEDICINE CLINIC | Facility: CLINIC | Age: 68
End: 2023-04-03
Payer: MEDICARE

## 2023-04-03 ENCOUNTER — TELEPHONE (OUTPATIENT)
Dept: ORTHOPEDICS CLINIC | Facility: CLINIC | Age: 68
End: 2023-04-03

## 2023-04-03 VITALS
WEIGHT: 192 LBS | OXYGEN SATURATION: 98 % | DIASTOLIC BLOOD PRESSURE: 84 MMHG | HEART RATE: 89 BPM | BODY MASS INDEX: 37.69 KG/M2 | HEIGHT: 60 IN | SYSTOLIC BLOOD PRESSURE: 146 MMHG

## 2023-04-03 DIAGNOSIS — I10 HYPERTENSION, UNSPECIFIED TYPE: ICD-10-CM

## 2023-04-03 DIAGNOSIS — N64.9 BREAST LESION: ICD-10-CM

## 2023-04-03 DIAGNOSIS — Z01.818 PRE-OP EXAM: Primary | ICD-10-CM

## 2023-04-03 LAB
ALBUMIN SERPL-MCNC: 3.8 G/DL (ref 3.4–5)
ALBUMIN/GLOB SERPL: 0.9 {RATIO} (ref 1–2)
ALP LIVER SERPL-CCNC: 195 U/L
ALT SERPL-CCNC: 37 U/L
ANION GAP SERPL CALC-SCNC: 7 MMOL/L (ref 0–18)
AST SERPL-CCNC: 43 U/L (ref 15–37)
BASOPHILS # BLD AUTO: 0.07 X10(3) UL (ref 0–0.2)
BASOPHILS NFR BLD AUTO: 1.1 %
BILIRUB SERPL-MCNC: 0.7 MG/DL (ref 0.1–2)
BUN BLD-MCNC: 22 MG/DL (ref 7–18)
BUN/CREAT SERPL: 32.8 (ref 10–20)
CALCIUM BLD-MCNC: 9.3 MG/DL (ref 8.5–10.1)
CHLORIDE SERPL-SCNC: 108 MMOL/L (ref 98–112)
CO2 SERPL-SCNC: 27 MMOL/L (ref 21–32)
CREAT BLD-MCNC: 0.67 MG/DL
DEPRECATED RDW RBC AUTO: 43.3 FL (ref 35.1–46.3)
EOSINOPHIL # BLD AUTO: 0.39 X10(3) UL (ref 0–0.7)
EOSINOPHIL NFR BLD AUTO: 6.2 %
ERYTHROCYTE [DISTWIDTH] IN BLOOD BY AUTOMATED COUNT: 12.4 % (ref 11–15)
FASTING STATUS PATIENT QL REPORTED: NO
GFR SERPLBLD BASED ON 1.73 SQ M-ARVRAT: 97 ML/MIN/1.73M2 (ref 60–?)
GLOBULIN PLAS-MCNC: 4.3 G/DL (ref 2.8–4.4)
GLUCOSE BLD-MCNC: 75 MG/DL (ref 70–99)
HCT VFR BLD AUTO: 43.2 %
HGB BLD-MCNC: 14 G/DL
IMM GRANULOCYTES # BLD AUTO: 0.01 X10(3) UL (ref 0–1)
IMM GRANULOCYTES NFR BLD: 0.2 %
LYMPHOCYTES # BLD AUTO: 0.98 X10(3) UL (ref 1–4)
LYMPHOCYTES NFR BLD AUTO: 15.6 %
MCH RBC QN AUTO: 30.5 PG (ref 26–34)
MCHC RBC AUTO-ENTMCNC: 32.4 G/DL (ref 31–37)
MCV RBC AUTO: 94.1 FL
MONOCYTES # BLD AUTO: 0.55 X10(3) UL (ref 0.1–1)
MONOCYTES NFR BLD AUTO: 8.8 %
NEUTROPHILS # BLD AUTO: 4.27 X10 (3) UL (ref 1.5–7.7)
NEUTROPHILS # BLD AUTO: 4.27 X10(3) UL (ref 1.5–7.7)
NEUTROPHILS NFR BLD AUTO: 68.1 %
OSMOLALITY SERPL CALC.SUM OF ELEC: 296 MOSM/KG (ref 275–295)
PLATELET # BLD AUTO: 139 10(3)UL (ref 150–450)
POTASSIUM SERPL-SCNC: 3.8 MMOL/L (ref 3.5–5.1)
PROT SERPL-MCNC: 8.1 G/DL (ref 6.4–8.2)
RBC # BLD AUTO: 4.59 X10(6)UL
SODIUM SERPL-SCNC: 142 MMOL/L (ref 136–145)
WBC # BLD AUTO: 6.3 X10(3) UL (ref 4–11)

## 2023-04-03 PROCEDURE — 99214 OFFICE O/P EST MOD 30 MIN: CPT | Performed by: FAMILY MEDICINE

## 2023-04-03 PROCEDURE — 80053 COMPREHEN METABOLIC PANEL: CPT | Performed by: FAMILY MEDICINE

## 2023-04-03 PROCEDURE — 85025 COMPLETE CBC W/AUTO DIFF WBC: CPT | Performed by: FAMILY MEDICINE

## 2023-04-03 RX ORDER — LOSARTAN POTASSIUM 25 MG/1
25 TABLET ORAL DAILY
Qty: 90 TABLET | Refills: 0 | Status: SHIPPED | OUTPATIENT
Start: 2023-04-03

## 2023-04-03 NOTE — TELEPHONE ENCOUNTER
Good morning Dr Miquel Linton-    Patient will need the following for surgery with Dr Karla Baker, surgery date 5/1/23    CBC/CMP - Good for 3 months  EKG - Good for 1 year    Paperwork was given to patient in the office, to bring with to PCP appointment.     Thank you- Dottie Santo

## 2023-04-03 NOTE — TELEPHONE ENCOUNTER
Per PCP pt will be seen today for pre op visit, asking what tests will be needed. Please call thank you.

## 2023-04-03 NOTE — PATIENT INSTRUCTIONS
Start losartan 25 mg daily for blood pressure    Please let me know what next blood pressure reading is at wound clinic    Please sched stress test and echo

## 2023-04-06 ENCOUNTER — TELEPHONE (OUTPATIENT)
Dept: INTERNAL MEDICINE CLINIC | Facility: CLINIC | Age: 68
End: 2023-04-06

## 2023-04-06 PROBLEM — R74.8 ELEVATED ALKALINE PHOSPHATASE LEVEL: Status: ACTIVE | Noted: 2023-04-06

## 2023-04-10 ENCOUNTER — TELEPHONE (OUTPATIENT)
Dept: INTERNAL MEDICINE CLINIC | Facility: CLINIC | Age: 68
End: 2023-04-10

## 2023-04-11 ENCOUNTER — NURSE ONLY (OUTPATIENT)
Dept: WOUND CARE | Facility: HOSPITAL | Age: 68
End: 2023-04-11
Attending: NURSE PRACTITIONER
Payer: MEDICARE

## 2023-04-11 VITALS
SYSTOLIC BLOOD PRESSURE: 157 MMHG | DIASTOLIC BLOOD PRESSURE: 86 MMHG | TEMPERATURE: 98 F | RESPIRATION RATE: 20 BRPM | HEART RATE: 99 BPM | OXYGEN SATURATION: 96 %

## 2023-04-11 DIAGNOSIS — L59.8 RADIATION-INDUCED FIBROSIS OF SOFT TISSUE FROM THERAPEUTIC PROCEDURE: ICD-10-CM

## 2023-04-11 DIAGNOSIS — Z85.3 HISTORY OF BREAST CANCER: ICD-10-CM

## 2023-04-11 DIAGNOSIS — Z90.11 STATUS POST RIGHT MASTECTOMY: ICD-10-CM
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
## 2023-04-11 DIAGNOSIS — S21.101S: Primary | ICD-10-CM

## 2023-04-11 PROBLEM — S21.101A OPEN CHEST WOUND, RIGHT, INITIAL ENCOUNTER: Status: ACTIVE | Noted: 2023-04-11

## 2023-04-11 PROCEDURE — 99213 OFFICE O/P EST LOW 20 MIN: CPT | Performed by: NURSE PRACTITIONER

## 2023-04-12 ENCOUNTER — TELEPHONE (OUTPATIENT)
Dept: ORTHOPEDICS CLINIC | Facility: CLINIC | Age: 68
End: 2023-04-12

## 2023-04-12 NOTE — TELEPHONE ENCOUNTER
Dr Mar Joseph - Please advise    Patient called, she is wanting to keep us up to date with everything going on. Long story short - patient had breast cancer over 18 years ago, patient ended up with a skin graft after removal. In 2/2023 patient noticed a juliet on skin graft, that turned into a \"whole\". Patient is being seen at the wound clinic, she does not have an infection, a wound vac was placed on on 4/11/23. Patient is wondering if she is still able to have surgery for Right knee arthroscopy, possible partial meniscectomy vs meniscal repair scheduled for 5/1/23.

## 2023-04-14 NOTE — TELEPHONE ENCOUNTER
As long as there is no active infection, likely ok to proceed with knee arthroscopy. I would prefer no open wounds at time of knee surgery.

## 2023-04-17 ENCOUNTER — NURSE ONLY (OUTPATIENT)
Dept: WOUND CARE | Facility: HOSPITAL | Age: 68
End: 2023-04-17
Attending: NURSE PRACTITIONER
Payer: MEDICARE

## 2023-04-17 VITALS
TEMPERATURE: 98 F | DIASTOLIC BLOOD PRESSURE: 84 MMHG | OXYGEN SATURATION: 94 % | HEART RATE: 74 BPM | RESPIRATION RATE: 20 BRPM | SYSTOLIC BLOOD PRESSURE: 153 MMHG

## 2023-04-17 DIAGNOSIS — S21.101S: ICD-10-CM

## 2023-04-17 PROCEDURE — 97597 DBRDMT OPN WND 1ST 20 CM/<: CPT | Performed by: NURSE PRACTITIONER

## 2023-04-17 PROCEDURE — 97607 NEG PRS WND THR NDME<=50SQCM: CPT

## 2023-04-17 NOTE — TELEPHONE ENCOUNTER
Dr. Dereck Peralta team Caitlyn Nunes with patient advised per Dr. Jason Halsted long as there is no active infection, likely ok to proceed with knee arthroscopy. I would prefer no open wounds at time of knee surgery. \"      Patient reports she was told by PCP and wound clinic provider they do not feel she currently has an active bone infection to right breast wound. Reports her PCP Dr. Nirmala Ocampo is waiting for the results of her echo scheduled for 4/20/23 and then she should hopefully be cleared from PCP for upcoming 5/1/23 right knee surgery . Patient states she was told her wound may never completely close to right breast. Reports is following with wound clinic currently using wound vac system.

## 2023-04-18 ENCOUNTER — APPOINTMENT (OUTPATIENT)
Dept: WOUND CARE | Facility: HOSPITAL | Age: 68
End: 2023-04-18
Attending: NURSE PRACTITIONER
Payer: MEDICARE

## 2023-04-20 ENCOUNTER — HOSPITAL ENCOUNTER (OUTPATIENT)
Dept: NUCLEAR MEDICINE | Facility: HOSPITAL | Age: 68
Discharge: HOME | End: 2023-04-20
Attending: FAMILY MEDICINE
Payer: MEDICARE

## 2023-04-20 ENCOUNTER — HOSPITAL ENCOUNTER (OUTPATIENT)
Dept: CV DIAGNOSTICS | Facility: HOSPITAL | Age: 68
Discharge: HOME | End: 2023-04-20
Attending: FAMILY MEDICINE
Payer: MEDICARE

## 2023-04-20 DIAGNOSIS — R06.02 SHORTNESS OF BREATH: ICD-10-CM

## 2023-04-20 DIAGNOSIS — Z01.818 PRE-OP EXAM: ICD-10-CM

## 2023-04-20 LAB
% OF MAX PREDICTED HR: 100 %
MAX DIASTOLIC BP: 63 MMHG
MAX HEART RATE: 98 BPM
MAX PREDICTED HEART RATE: 155 BPM
MAX SYSTOLIC BP: 161 MMHG
MAX WORK LOAD: 10

## 2023-04-20 PROCEDURE — 93016 CV STRESS TEST SUPVJ ONLY: CPT | Performed by: INTERNAL MEDICINE

## 2023-04-20 PROCEDURE — 93018 CV STRESS TEST I&R ONLY: CPT | Performed by: INTERNAL MEDICINE

## 2023-04-20 PROCEDURE — 93306 TTE W/DOPPLER COMPLETE: CPT | Performed by: FAMILY MEDICINE

## 2023-04-20 PROCEDURE — 93017 CV STRESS TEST TRACING ONLY: CPT | Performed by: FAMILY MEDICINE

## 2023-04-20 PROCEDURE — 78452 HT MUSCLE IMAGE SPECT MULT: CPT | Performed by: FAMILY MEDICINE

## 2023-04-20 RX ORDER — REGADENOSON 0.08 MG/ML
INJECTION, SOLUTION INTRAVENOUS
Status: COMPLETED
Start: 2023-04-20 | End: 2023-04-20

## 2023-04-20 RX ADMIN — REGADENOSON 0.4 MG: 0.08 INJECTION, SOLUTION INTRAVENOUS at 11:11:00

## 2023-04-24 ENCOUNTER — NURSE ONLY (OUTPATIENT)
Dept: WOUND CARE | Facility: HOSPITAL | Age: 68
End: 2023-04-24
Attending: NURSE PRACTITIONER
Payer: MEDICARE

## 2023-04-24 ENCOUNTER — TELEPHONE (OUTPATIENT)
Dept: ORTHOPEDICS CLINIC | Facility: CLINIC | Age: 68
End: 2023-04-24

## 2023-04-24 VITALS
RESPIRATION RATE: 19 BRPM | HEART RATE: 100 BPM | OXYGEN SATURATION: 98 % | TEMPERATURE: 99 F | DIASTOLIC BLOOD PRESSURE: 79 MMHG | SYSTOLIC BLOOD PRESSURE: 157 MMHG

## 2023-04-24 DIAGNOSIS — S21.101S: ICD-10-CM

## 2023-04-24 PROCEDURE — 97607 NEG PRS WND THR NDME<=50SQCM: CPT

## 2023-04-24 NOTE — TELEPHONE ENCOUNTER
Received via fax STD paperwork from [facility]. Emailed and faxed to forms dept.

## 2023-04-25 ENCOUNTER — APPOINTMENT (OUTPATIENT)
Dept: WOUND CARE | Facility: HOSPITAL | Age: 68
End: 2023-04-25
Attending: NURSE PRACTITIONER
Payer: MEDICARE

## 2023-04-26 ENCOUNTER — TELEPHONE (OUTPATIENT)
Dept: ORTHOPEDICS CLINIC | Facility: CLINIC | Age: 68
End: 2023-04-26

## 2023-04-26 NOTE — TELEPHONE ENCOUNTER
Called and left a message for patient. Surgery was cancelled on 4/20/23. If she has any additional questions or concerns, she is to call us back at [phone].

## 2023-04-26 NOTE — TELEPHONE ENCOUNTER
Patient states she was just seen by cardiologist and it seems like surgery on 05/01 is being cancel.  Please advise

## 2023-05-01 ENCOUNTER — OFFICE VISIT (OUTPATIENT)
Dept: WOUND CARE | Facility: HOSPITAL | Age: 68
End: 2023-05-01
Attending: NURSE PRACTITIONER
Payer: MEDICARE

## 2023-05-01 ENCOUNTER — LAB ENCOUNTER (OUTPATIENT)
Dept: LAB | Facility: HOSPITAL | Age: 68
End: 2023-05-01
Attending: INTERNAL MEDICINE
Payer: MEDICARE

## 2023-05-01 VITALS
HEART RATE: 101 BPM | RESPIRATION RATE: 18 BRPM | TEMPERATURE: 97 F | DIASTOLIC BLOOD PRESSURE: 66 MMHG | SYSTOLIC BLOOD PRESSURE: 140 MMHG | OXYGEN SATURATION: 100 %

## 2023-05-01 DIAGNOSIS — S21.101S: ICD-10-CM

## 2023-05-01 DIAGNOSIS — S21.101A OPEN CHEST WOUND, RIGHT, INITIAL ENCOUNTER: ICD-10-CM

## 2023-05-01 DIAGNOSIS — Z01.818 PREOPERATIVE EXAMINATION, UNSPECIFIED: Primary | ICD-10-CM

## 2023-05-01 LAB
ANION GAP SERPL CALC-SCNC: 7 MMOL/L (ref 0–18)
BASOPHILS # BLD AUTO: 0.05 X10(3) UL (ref 0–0.2)
BASOPHILS NFR BLD AUTO: 1 %
BUN BLD-MCNC: 18 MG/DL (ref 7–18)
BUN/CREAT SERPL: 30 (ref 10–20)
CALCIUM BLD-MCNC: 9.1 MG/DL (ref 8.5–10.1)
CHLORIDE SERPL-SCNC: 109 MMOL/L (ref 98–112)
CO2 SERPL-SCNC: 25 MMOL/L (ref 21–32)
CREAT BLD-MCNC: 0.6 MG/DL
DEPRECATED RDW RBC AUTO: 42 FL (ref 35.1–46.3)
EOSINOPHIL # BLD AUTO: 0.35 X10(3) UL (ref 0–0.7)
EOSINOPHIL NFR BLD AUTO: 7.2 %
ERYTHROCYTE [DISTWIDTH] IN BLOOD BY AUTOMATED COUNT: 12.3 % (ref 11–15)
FASTING STATUS PATIENT QL REPORTED: NO
GFR SERPLBLD BASED ON 1.73 SQ M-ARVRAT: 100 ML/MIN/1.73M2 (ref 60–?)
GLUCOSE BLD-MCNC: 80 MG/DL (ref 70–99)
HCT VFR BLD AUTO: 44.2 %
HGB BLD-MCNC: 14.1 G/DL
IMM GRANULOCYTES # BLD AUTO: 0.02 X10(3) UL (ref 0–1)
IMM GRANULOCYTES NFR BLD: 0.4 %
INR BLD: 1.16 (ref 0.85–1.16)
LYMPHOCYTES # BLD AUTO: 0.79 X10(3) UL (ref 1–4)
LYMPHOCYTES NFR BLD AUTO: 16.4 %
MCH RBC QN AUTO: 29.6 PG (ref 26–34)
MCHC RBC AUTO-ENTMCNC: 31.9 G/DL (ref 31–37)
MCV RBC AUTO: 92.9 FL
MONOCYTES # BLD AUTO: 0.39 X10(3) UL (ref 0.1–1)
MONOCYTES NFR BLD AUTO: 8.1 %
NEUTROPHILS # BLD AUTO: 3.23 X10 (3) UL (ref 1.5–7.7)
NEUTROPHILS # BLD AUTO: 3.23 X10(3) UL (ref 1.5–7.7)
NEUTROPHILS NFR BLD AUTO: 66.9 %
OSMOLALITY SERPL CALC.SUM OF ELEC: 293 MOSM/KG (ref 275–295)
PLATELET # BLD AUTO: 111 10(3)UL (ref 150–450)
POTASSIUM SERPL-SCNC: 3.8 MMOL/L (ref 3.5–5.1)
PROTHROMBIN TIME: 14.7 SECONDS (ref 11.6–14.8)
RBC # BLD AUTO: 4.76 X10(6)UL
SODIUM SERPL-SCNC: 141 MMOL/L (ref 136–145)
WBC # BLD AUTO: 4.8 X10(3) UL (ref 4–11)

## 2023-05-01 PROCEDURE — 97597 DBRDMT OPN WND 1ST 20 CM/<: CPT | Performed by: NURSE PRACTITIONER

## 2023-05-01 PROCEDURE — 80048 BASIC METABOLIC PNL TOTAL CA: CPT

## 2023-05-01 PROCEDURE — 97607 NEG PRS WND THR NDME<=50SQCM: CPT

## 2023-05-01 PROCEDURE — 85025 COMPLETE CBC W/AUTO DIFF WBC: CPT

## 2023-05-01 PROCEDURE — 85610 PROTHROMBIN TIME: CPT

## 2023-05-01 PROCEDURE — 36415 COLL VENOUS BLD VENIPUNCTURE: CPT

## 2023-05-02 ENCOUNTER — APPOINTMENT (OUTPATIENT)
Dept: WOUND CARE | Facility: HOSPITAL | Age: 68
End: 2023-05-02
Attending: NURSE PRACTITIONER
Payer: MEDICARE

## 2023-05-05 ENCOUNTER — HOSPITAL ENCOUNTER (OUTPATIENT)
Dept: INTERVENTIONAL RADIOLOGY/VASCULAR | Facility: HOSPITAL | Age: 68
Discharge: HOME | End: 2023-05-05
Attending: INTERNAL MEDICINE | Admitting: INTERNAL MEDICINE
Payer: MEDICARE

## 2023-05-05 VITALS
HEIGHT: 60 IN | OXYGEN SATURATION: 97 % | WEIGHT: 193 LBS | RESPIRATION RATE: 17 BRPM | TEMPERATURE: 98 F | BODY MASS INDEX: 37.89 KG/M2 | DIASTOLIC BLOOD PRESSURE: 65 MMHG | SYSTOLIC BLOOD PRESSURE: 137 MMHG | HEART RATE: 83 BPM

## 2023-05-05 DIAGNOSIS — R94.39 ABNORMAL STRESS TEST: ICD-10-CM

## 2023-05-05 DIAGNOSIS — Z01.810 PRE-OPERATIVE CARDIOVASCULAR EXAMINATION: ICD-10-CM

## 2023-05-05 PROCEDURE — 93458 L HRT ARTERY/VENTRICLE ANGIO: CPT | Performed by: INTERNAL MEDICINE

## 2023-05-05 PROCEDURE — 36415 COLL VENOUS BLD VENIPUNCTURE: CPT

## 2023-05-05 PROCEDURE — B2151ZZ FLUOROSCOPY OF LEFT HEART USING LOW OSMOLAR CONTRAST: ICD-10-PCS | Performed by: INTERNAL MEDICINE

## 2023-05-05 PROCEDURE — 99152 MOD SED SAME PHYS/QHP 5/>YRS: CPT | Performed by: INTERNAL MEDICINE

## 2023-05-05 PROCEDURE — B2111ZZ FLUOROSCOPY OF MULTIPLE CORONARY ARTERIES USING LOW OSMOLAR CONTRAST: ICD-10-PCS | Performed by: INTERNAL MEDICINE

## 2023-05-05 PROCEDURE — 4A023N7 MEASUREMENT OF CARDIAC SAMPLING AND PRESSURE, LEFT HEART, PERCUTANEOUS APPROACH: ICD-10-PCS | Performed by: INTERNAL MEDICINE

## 2023-05-05 RX ORDER — VERAPAMIL HYDROCHLORIDE 2.5 MG/ML
INJECTION, SOLUTION INTRAVENOUS
Status: COMPLETED
Start: 2023-05-05 | End: 2023-05-05

## 2023-05-05 RX ORDER — SODIUM CHLORIDE 9 MG/ML
INJECTION, SOLUTION INTRAVENOUS
Status: COMPLETED | OUTPATIENT
Start: 2023-05-05 | End: 2023-05-05

## 2023-05-05 RX ORDER — MIDAZOLAM HYDROCHLORIDE 1 MG/ML
INJECTION INTRAMUSCULAR; INTRAVENOUS
Status: COMPLETED
Start: 2023-05-05 | End: 2023-05-05

## 2023-05-05 RX ORDER — LIDOCAINE HYDROCHLORIDE 20 MG/ML
INJECTION, SOLUTION EPIDURAL; INFILTRATION; INTRACAUDAL; PERINEURAL
Status: COMPLETED
Start: 2023-05-05 | End: 2023-05-05

## 2023-05-05 RX ORDER — NITROGLYCERIN 20 MG/100ML
INJECTION INTRAVENOUS
Status: COMPLETED
Start: 2023-05-05 | End: 2023-05-05

## 2023-05-05 RX ORDER — HEPARIN SODIUM 1000 [USP'U]/ML
INJECTION, SOLUTION INTRAVENOUS; SUBCUTANEOUS
Status: COMPLETED
Start: 2023-05-05 | End: 2023-05-05

## 2023-05-05 RX ADMIN — SODIUM CHLORIDE: 9 INJECTION, SOLUTION INTRAVENOUS at 10:00:00

## 2023-05-05 NOTE — IVS NOTE
DISCHARGE NOTE    1, PATIENT AWAKE AND ALERT X 4    2. ASHTON, VSS, NO PONV, NO PAIN    3. INSTRUCTIONS GIVEN TO PATIENT AND FAMILY    4. IV ACCESS WAS REMOVED BEFORE DISCHARGE    5. DR. Alicia          SPOKE WITH PATIENT AND FAMILY POST PROCEDURE    6. PATIENT ABLE TO DRINK, [address], VOID    7. PROCEDURAL SITE REMAINS DRY, INTACT WITH GOOD CIRCULATION,    MOVEMENT AND SENSATION arm placed on arm board and in sling    8. FOLLOW UP APPOINTMENT WITH Ravin  On 5/15    9. PATIENT DISCHARGED VIA WHEEL CHAIR TO main entrance    10.  NEW PRESCRIPTION: n/a

## 2023-05-05 NOTE — PROCEDURES
[facility]    Cardiac Cath Procedure Note  Erminio Splinter Patient Status:  Outpatient    1955 MRN [id number]   Location [facility] Attending Tong Cazares MD   Hosp Day # 0 PCP Rosendo Ordonez MD       Cardiologist: Shannon June MD  Primary Proceduralist: Shannon June MD  Procedure Performed: LHC and LV  Date of Procedure: 2023   Indication: Positive stress test    Summary of procedure:  Normal coronary anatomy      Assessment:  False positive stress test  Normal coronary anatomy      Recommendations:  May proceed to knee surgery without further cardiac testing  Continue aggressive risk factor modification      Left Ventriculography and hemodynamics:   LV EF not done  LV EDP 4 mmHg, 1 L IVF given  No gradient across aortic valve        Coronary Angiography  RCA:  Dominant and free of obstructive disease, supplies PDA and PL    Left main:  Free of obstructive disease    Left anterior descending:  Free of obstructive disease, supplies multiple diagonals which are non-obstructive    Circumflex:  Free of obstructive disease, supplies multiple OM branches which are patent        Summary of Case: After written informed consent was obtained from the patient, patient was brought to the cardiac catheterization laboratory. Patient was prepped and draped in the usual sterile fashion. Lidocaine 1% was used to infiltrate the right radial artery for local anesthesia and a 6 Yakut introducer sheath was inserted into the right radial artery. Selective coronary angiography performed with JR4 catheter for RCA and JL3.5 catheter for LCA. Angiography performed in standard projections. 6 Syriac JR4 catheter placed in LV for hemodynamics. Specimen sent to: No specimen collected  Estimated blood loss: 10 cc  Closure:  TR band      IV was maintained by RN and moderate conscious sedation of versed and fentanyl was given.   Patient was assessed and monitoring of oxygen, heart rate and blood pressure by nurse and myself during the exam 35 minutes.       Herb Colon MD  05/05/23

## 2023-05-05 NOTE — INTERVAL H&P NOTE
Pre-op Diagnosis: * No pre-op diagnosis entered *    The above referenced H&P was reviewed by Magy Frost MD on 5/5/2023, the patient was examined and no significant changes have occurred in the patient's condition since the H&P was performed. I discussed with the patient and/or legal representative the potential benefits, risks and side effects of this procedure; the likelihood of the patient achieving goals; and potential problems that might occur during recuperation. I discussed reasonable alternatives to the procedure, including risks, benefits and side effects related to the alternatives and risks related to not receiving this procedure. We will proceed with procedure as planned.

## 2023-05-05 NOTE — DISCHARGE INSTRUCTIONS
Transradial Angiogram Discharge Instructions    The following instructions are for patient who have had an angiogram, cardiac cath, angioplasty, or stent through the radial artery. Proper skin puncture site care is important during the healing period. This guideline should help to remind you of the verbal instructions you received from your physician or nurse. Site: right    Site Care: For 5 days after the procedure, make sure the wrist is not submerged in water or any liquid. Leave bandage in place for 24 hours. Then, gently wash with soap and water. Do no put any other bandage, ointment, powders, or creams to the site. For local swelling: apply ice  If bleeding occurs, elevate the hand above the heart and apply local pressure    Activity/Driving:  Avoid wrist flexion, extension, and fine motor activities (i.e. Texting, typing, using a computer mouse, etc.) for 24 hours. Do not drive for 24 hours. Do not lift or pull anything heavier than 10 pounnds with affected hand for 1 week. Additional Instructions:  Do not take glucophage/metformin containing products for at least 48 hours after procedure, unless otherwise directed by your physician. When to contact your physician  Call Dr. Saritha Morris at [phone] right away if you experience any of the following:    Swelling, pain, or bleeding at the site that is not relieved by applying ice or pressure  Signs of infection: redness, warmth, drainage at the site, chills, or temperature of 100.5 degrees or greater.   Changes in sensation, numbness, or tingling of affected hand

## 2023-05-08 ENCOUNTER — NURSE ONLY (OUTPATIENT)
Dept: WOUND CARE | Facility: HOSPITAL | Age: 68
End: 2023-05-08
Attending: NURSE PRACTITIONER
Payer: MEDICARE

## 2023-05-08 VITALS
SYSTOLIC BLOOD PRESSURE: 161 MMHG | HEART RATE: 92 BPM | DIASTOLIC BLOOD PRESSURE: 82 MMHG | OXYGEN SATURATION: 97 % | RESPIRATION RATE: 18 BRPM | TEMPERATURE: 99 F

## 2023-05-08 DIAGNOSIS — Z85.3 HISTORY OF BREAST CANCER: ICD-10-CM

## 2023-05-08 DIAGNOSIS — S21.101S: Primary | ICD-10-CM

## 2023-05-08 DIAGNOSIS — S21.101S: ICD-10-CM

## 2023-05-08 PROCEDURE — 97607 NEG PRS WND THR NDME<=50SQCM: CPT

## 2023-05-09 ENCOUNTER — TELEPHONE (OUTPATIENT)
Dept: ORTHOPEDICS CLINIC | Facility: CLINIC | Age: 68
End: 2023-05-09

## 2023-05-10 ENCOUNTER — OFFICE VISIT (OUTPATIENT)
Dept: INTERNAL MEDICINE CLINIC | Facility: CLINIC | Age: 68
End: 2023-05-10
Payer: MEDICARE

## 2023-05-10 VITALS
HEART RATE: 82 BPM | BODY MASS INDEX: 37.5 KG/M2 | DIASTOLIC BLOOD PRESSURE: 78 MMHG | WEIGHT: 191 LBS | HEIGHT: 60 IN | OXYGEN SATURATION: 98 % | SYSTOLIC BLOOD PRESSURE: 134 MMHG

## 2023-05-10 DIAGNOSIS — Z02.89 ENCOUNTER FOR COMPLETION OF FORM WITH PATIENT: ICD-10-CM

## 2023-05-10 DIAGNOSIS — N64.9 BREAST LESION: Primary | ICD-10-CM

## 2023-05-10 DIAGNOSIS — Z87.891 SMOKING HISTORY: ICD-10-CM

## 2023-05-10 DIAGNOSIS — M25.569 CHRONIC KNEE PAIN, UNSPECIFIED LATERALITY: ICD-10-CM

## 2023-05-10 DIAGNOSIS — Z85.3 HISTORY OF BREAST CANCER: ICD-10-CM

## 2023-05-10 DIAGNOSIS — J43.2 CENTRILOBULAR EMPHYSEMA (HCC): ICD-10-CM

## 2023-05-10 DIAGNOSIS — D69.6 THROMBOCYTOPENIA (HCC): ICD-10-CM

## 2023-05-10 DIAGNOSIS — I10 HYPERTENSION, UNSPECIFIED TYPE: ICD-10-CM

## 2023-05-10 DIAGNOSIS — G89.29 CHRONIC KNEE PAIN, UNSPECIFIED LATERALITY: ICD-10-CM

## 2023-05-10 DIAGNOSIS — R00.2 PALPITATIONS: ICD-10-CM

## 2023-05-10 PROCEDURE — 99215 OFFICE O/P EST HI 40 MIN: CPT | Performed by: FAMILY MEDICINE

## 2023-05-10 NOTE — TELEPHONE ENCOUNTER
Noted, will send request for surgery dates available. We will contact patient as soon as we get dates.

## 2023-05-11 ENCOUNTER — TELEPHONE (OUTPATIENT)
Dept: ORTHOPEDICS CLINIC | Facility: CLINIC | Age: 68
End: 2023-05-11

## 2023-05-11 NOTE — TELEPHONE ENCOUNTER
[facility] [phone]. We have surgery dates available, these dates are good for 48 hours.

## 2023-05-12 ENCOUNTER — OFFICE VISIT (OUTPATIENT)
Dept: SURGERY | Facility: CLINIC | Age: 68
End: 2023-05-12
Payer: MEDICARE

## 2023-05-12 VITALS
TEMPERATURE: 98 F | RESPIRATION RATE: 17 BRPM | WEIGHT: 191 LBS | HEART RATE: 92 BPM | SYSTOLIC BLOOD PRESSURE: 145 MMHG | DIASTOLIC BLOOD PRESSURE: 71 MMHG | OXYGEN SATURATION: 98 % | BODY MASS INDEX: 37 KG/M2

## 2023-05-12 DIAGNOSIS — C50.911 MALIGNANT NEOPLASM OF RIGHT BREAST IN FEMALE, ESTROGEN RECEPTOR POSITIVE, UNSPECIFIED SITE OF BREAST (HCC): Primary | ICD-10-CM

## 2023-05-12 DIAGNOSIS — Z17.0 MALIGNANT NEOPLASM OF RIGHT BREAST IN FEMALE, ESTROGEN RECEPTOR POSITIVE, UNSPECIFIED SITE OF BREAST (HCC): Primary | ICD-10-CM

## 2023-05-12 DIAGNOSIS — S21.101S: ICD-10-CM

## 2023-05-12 PROCEDURE — 87186 SC STD MICRODIL/AGAR DIL: CPT | Performed by: SURGERY

## 2023-05-12 PROCEDURE — 87077 CULTURE AEROBIC IDENTIFY: CPT | Performed by: SURGERY

## 2023-05-12 PROCEDURE — 99213 OFFICE O/P EST LOW 20 MIN: CPT | Performed by: SURGERY

## 2023-05-12 PROCEDURE — 87070 CULTURE OTHR SPECIMN AEROBIC: CPT | Performed by: SURGERY

## 2023-05-12 PROCEDURE — 87205 SMEAR GRAM STAIN: CPT | Performed by: SURGERY

## 2023-05-15 ENCOUNTER — NURSE ONLY (OUTPATIENT)
Dept: WOUND CARE | Facility: HOSPITAL | Age: 68
End: 2023-05-15
Attending: NURSE PRACTITIONER
Payer: MEDICARE

## 2023-05-15 ENCOUNTER — TELEPHONE (OUTPATIENT)
Dept: ORTHOPEDICS CLINIC | Facility: CLINIC | Age: 68
End: 2023-05-15

## 2023-05-15 VITALS
OXYGEN SATURATION: 96 % | HEART RATE: 89 BPM | SYSTOLIC BLOOD PRESSURE: 145 MMHG | TEMPERATURE: 98 F | DIASTOLIC BLOOD PRESSURE: 60 MMHG | RESPIRATION RATE: 20 BRPM

## 2023-05-15 DIAGNOSIS — Z85.3 HISTORY OF BREAST CANCER: Primary | ICD-10-CM

## 2023-05-15 DIAGNOSIS — S21.101S: ICD-10-CM

## 2023-05-15 DIAGNOSIS — Z90.11 STATUS POST RIGHT MASTECTOMY: ICD-10-CM

## 2023-05-15 PROCEDURE — 99213 OFFICE O/P EST LOW 20 MIN: CPT | Performed by: NURSE PRACTITIONER

## 2023-05-15 NOTE — TELEPHONE ENCOUNTER
Type of surgery:  Right knee arthroscopy, possible partial menisectomy or meniscal repair, possible debridement   Date: 6/19/23  Location: [facility]  Medical Clearance:      *Medical: Yes      *Dental: No      *Other:  Prior Authorization Status: Pending  Workers Comp:   Medacta/Hermila:  [place]: Yes  POV: 6/28/23

## 2023-05-19 DIAGNOSIS — E03.9 HYPOTHYROIDISM, UNSPECIFIED TYPE: ICD-10-CM

## 2023-05-20 RX ORDER — LEVOTHYROXINE SODIUM 0.12 MG/1
TABLET ORAL
Qty: 90 TABLET | Refills: 0 | Status: SHIPPED | OUTPATIENT
Start: 2023-05-20

## 2023-05-22 ENCOUNTER — TELEPHONE (OUTPATIENT)
Dept: SURGERY | Facility: CLINIC | Age: 68
End: 2023-05-22

## 2023-05-22 ENCOUNTER — NURSE ONLY (OUTPATIENT)
Dept: WOUND CARE | Facility: HOSPITAL | Age: 68
End: 2023-05-22
Attending: NURSE PRACTITIONER
Payer: MEDICARE

## 2023-05-22 VITALS
DIASTOLIC BLOOD PRESSURE: 67 MMHG | TEMPERATURE: 98 F | OXYGEN SATURATION: 98 % | HEART RATE: 86 BPM | SYSTOLIC BLOOD PRESSURE: 131 MMHG

## 2023-05-22 DIAGNOSIS — S21.101S: ICD-10-CM

## 2023-05-22 PROCEDURE — 97605 NEG PRS WND THER DME<=50SQCM: CPT

## 2023-05-22 NOTE — PROGRESS NOTES
05/22/23 1544   Negative Pressure Wound Therapy Breast Lower;Right   Placement Date/Time: 04/17/23 1534   Location: Breast  Wound Location Orientation: Lower;Right   Wound photographed/measured Yes   Machine Status (On) Yes   Site Assessment Moist;Pink;Yellow   Mesha-wound Assessment Dry; Intact;Fragile; Hyperpigmented;Pink   Unit Type LEYLA   Dressing Type White foam  (acticoat)   Number of Foam Pieces Used 1   Cycle Continuous   Drainage Description Yellow;Brown   Dressing Status Clean;Dry; Intact;Dressing Changed   Wound 03/30/23 Traumatic Breast Lower;Right   Date First Assessed/Time First Assessed: 03/30/23 0949   Primary Wound Type: Traumatic  Location: Breast  Wound Location Orientation: Lower;Right   Wound Image    Site Assessment Yellow;Pink;Moist;Painful   Drainage Amount Moderate   Drainage Description Yellow;Tan;Brown   Treatments Cleansed;Site Care;Topical (Barrier/Moisturizer/Ointment)   Dressing Wound vac sponge; Acticoat  (leyla)   Dressing Changed Changed   Dressing Status Clean;Dry; Intact   Wound Length (cm) 1.1 cm   Wound Width (cm) 4 cm   Wound Surface Area (cm^2) 4.4 cm^2   Wound Depth (cm) 0.2 cm   Wound Volume (cm^3) 0.88 cm^3   Margins Well-defined edges; Attached edges   Non-staged Wound Description Full thickness   Mesha-wound Assessment Fragile; Hyperpigmented;Pink   Wound Granulation Tissue Pink   Wound Bed Granulation (%) 70 %   Wound Bed Epithelium (%) 0 %   Wound Bed Slough (%) 30 %   Wound Bed Eschar (%) 0 %   Wound Odor None   Exposed Structure Bone  (bone palpable)   Wound Vac Brand LEYLA   State of Healing Non-healing;Early/partial granulation     Weekly Wound Education Note    Teaching Provided To: Patient  Training Topics: Cleasing and general instructions; Negative presssure therapy  Training Method: Explain/Verbal  Training Response: Patient responds and understands        Notes: Will be seing Dr. Kody Almeida tomorrow.

## 2023-05-22 NOTE — TELEPHONE ENCOUNTER
Called and talked to pt regarding new consult appt for tomorrow at [address] at 11am w\ Dr. Héctor Wilcox , pt confirmed her appt and she will arrive 30min early to fill out new consult packet

## 2023-05-23 ENCOUNTER — OFFICE VISIT (OUTPATIENT)
Dept: SURGERY | Facility: CLINIC | Age: 68
End: 2023-05-23
Payer: MEDICARE

## 2023-05-23 VITALS
HEART RATE: 93 BPM | HEIGHT: 59.06 IN | OXYGEN SATURATION: 97 % | TEMPERATURE: 98 F | SYSTOLIC BLOOD PRESSURE: 148 MMHG | DIASTOLIC BLOOD PRESSURE: 80 MMHG | RESPIRATION RATE: 16 BRPM | WEIGHT: 192.81 LBS | BODY MASS INDEX: 38.87 KG/M2

## 2023-05-23 DIAGNOSIS — S21.101S: Primary | ICD-10-CM

## 2023-05-25 ENCOUNTER — HOSPITAL ENCOUNTER (OUTPATIENT)
Dept: MAMMOGRAPHY | Facility: HOSPITAL | Age: 68
Discharge: HOME | End: 2023-05-25
Attending: FAMILY MEDICINE
Payer: MEDICARE

## 2023-05-25 DIAGNOSIS — R92.2 INCONCLUSIVE MAMMOGRAM: ICD-10-CM

## 2023-05-25 DIAGNOSIS — Z90.11 H/O RIGHT MASTECTOMY: ICD-10-CM

## 2023-05-25 PROCEDURE — 77065 DX MAMMO INCL CAD UNI: CPT | Performed by: FAMILY MEDICINE

## 2023-05-25 PROCEDURE — 77061 BREAST TOMOSYNTHESIS UNI: CPT | Performed by: FAMILY MEDICINE

## 2023-05-30 ENCOUNTER — APPOINTMENT (OUTPATIENT)
Dept: WOUND CARE | Facility: HOSPITAL | Age: 68
End: 2023-05-30
Attending: NURSE PRACTITIONER
Payer: MEDICARE

## 2023-05-30 VITALS
DIASTOLIC BLOOD PRESSURE: 74 MMHG | RESPIRATION RATE: 20 BRPM | SYSTOLIC BLOOD PRESSURE: 139 MMHG | TEMPERATURE: 98 F | OXYGEN SATURATION: 98 % | HEART RATE: 89 BPM

## 2023-05-30 DIAGNOSIS — S21.101A OPEN CHEST WOUND, RIGHT, INITIAL ENCOUNTER: ICD-10-CM

## 2023-05-30 PROCEDURE — 97607 NEG PRS WND THR NDME<=50SQCM: CPT

## 2023-05-30 NOTE — PROGRESS NOTES
05/30/23 1429   Negative Pressure Wound Therapy Breast Lower;Right   Placement Date/Time: 04/17/23 1534   Location: Breast  Wound Location Orientation: Lower;Right   Wound photographed/measured Yes   Machine Status (On) Yes   Site Assessment Moist;Pink;Yellow   Mesha-wound Assessment Dry; Intact;Fragile; Hyperpigmented;Pink   Unit Type LEYLA   Dressing Type White foam  (acticoat)   Number of Foam Pieces Used 1   Cycle Continuous   Drainage Description Yellow;Brown   Dressing Status Clean;Dry; Intact;Dressing Changed   Wound 03/30/23 Traumatic Breast Lower;Right   Date First Assessed/Time First Assessed: 03/30/23 0949   Primary Wound Type: Traumatic  Location: Breast  Wound Location Orientation: Lower;Right   Wound Image    Site Assessment Yellow;Pink;Moist;Painful   Drainage Amount Moderate   Drainage Description Yellow;Tan;Brown   Treatments Cleansed;Site Care;Topical (Barrier/Moisturizer/Ointment)   Dressing Wound vac sponge; Acticoat  (leyla)   Dressing Changed Changed   Dressing Status Clean;Dry; Intact   Wound Length (cm) 1.1 cm   Wound Width (cm) 4 cm   Wound Surface Area (cm^2) 4.4 cm^2   Wound Depth (cm) 0.2 cm   Wound Volume (cm^3) 0.88 cm^3   Margins Attached edges   Non-staged Wound Description Full thickness   Mesha-wound Assessment Fragile; Hyperpigmented;Pink   Wound Granulation Tissue Pink   Wound Bed Granulation (%) 30 %   Wound Bed Epithelium (%) 0 %   Wound Bed Slough (%) 70 %   Wound Bed Eschar (%) 0 %   Wound Odor None   Exposed Structure Bone   Wound Vac Brand LEYLA   State of Healing Non-healing;Early/partial granulation     Weekly Wound Education Note    Teaching Provided To: Patient  Training Topics: Cleasing and general instructions; Negative presssure therapy  Training Method: Explain/Verbal  Training Response: Patient responds and understands        Notes: Follow up appt with plastic surgeon Dr. Jose Martin Baker in 4 weeks.  Plastic surgeon wishing to do surgery but patient needs to quit smoking and lose weight.

## 2023-06-05 ENCOUNTER — OFFICE VISIT (OUTPATIENT)
Dept: INTERNAL MEDICINE CLINIC | Facility: CLINIC | Age: 68
End: 2023-06-05
Payer: MEDICARE

## 2023-06-05 VITALS
BODY MASS INDEX: 39 KG/M2 | HEART RATE: 85 BPM | DIASTOLIC BLOOD PRESSURE: 70 MMHG | SYSTOLIC BLOOD PRESSURE: 138 MMHG | WEIGHT: 193 LBS | OXYGEN SATURATION: 98 %

## 2023-06-05 DIAGNOSIS — D69.6 THROMBOCYTOPENIA (HCC): ICD-10-CM

## 2023-06-05 DIAGNOSIS — N64.9 BREAST LESION: ICD-10-CM

## 2023-06-05 DIAGNOSIS — Z72.0 TOBACCO USE: ICD-10-CM

## 2023-06-05 DIAGNOSIS — Z02.89 ENCOUNTER FOR COMPLETION OF FORM WITH PATIENT: ICD-10-CM

## 2023-06-05 DIAGNOSIS — Z01.818 PRE-OP EVALUATION: Primary | ICD-10-CM

## 2023-06-05 DIAGNOSIS — Z17.0 MALIGNANT NEOPLASM OF RIGHT BREAST IN FEMALE, ESTROGEN RECEPTOR POSITIVE, UNSPECIFIED SITE OF BREAST (HCC): ICD-10-CM

## 2023-06-05 DIAGNOSIS — E66.9 OBESITY (BMI 30-39.9): ICD-10-CM

## 2023-06-05 DIAGNOSIS — Z12.11 COLON CANCER SCREENING: ICD-10-CM

## 2023-06-05 DIAGNOSIS — C50.911 MALIGNANT NEOPLASM OF RIGHT BREAST IN FEMALE, ESTROGEN RECEPTOR POSITIVE, UNSPECIFIED SITE OF BREAST (HCC): ICD-10-CM

## 2023-06-05 DIAGNOSIS — G89.29 CHRONIC KNEE PAIN, UNSPECIFIED LATERALITY: ICD-10-CM

## 2023-06-05 DIAGNOSIS — M25.569 CHRONIC KNEE PAIN, UNSPECIFIED LATERALITY: ICD-10-CM

## 2023-06-06 ENCOUNTER — NURSE ONLY (OUTPATIENT)
Dept: WOUND CARE | Facility: HOSPITAL | Age: 68
End: 2023-06-06
Attending: NURSE PRACTITIONER
Payer: MEDICARE

## 2023-06-06 VITALS
DIASTOLIC BLOOD PRESSURE: 76 MMHG | TEMPERATURE: 98 F | OXYGEN SATURATION: 98 % | HEART RATE: 67 BPM | SYSTOLIC BLOOD PRESSURE: 146 MMHG | RESPIRATION RATE: 19 BRPM

## 2023-06-06 DIAGNOSIS — S21.101S: ICD-10-CM

## 2023-06-06 PROCEDURE — 97607 NEG PRS WND THR NDME<=50SQCM: CPT

## 2023-06-12 ENCOUNTER — NURSE ONLY (OUTPATIENT)
Dept: WOUND CARE | Facility: HOSPITAL | Age: 68
End: 2023-06-12
Attending: NURSE PRACTITIONER
Payer: MEDICARE

## 2023-06-12 ENCOUNTER — TELEPHONE (OUTPATIENT)
Dept: ORTHOPEDICS CLINIC | Facility: CLINIC | Age: 68
End: 2023-06-12

## 2023-06-12 VITALS
RESPIRATION RATE: 19 BRPM | OXYGEN SATURATION: 99 % | SYSTOLIC BLOOD PRESSURE: 129 MMHG | DIASTOLIC BLOOD PRESSURE: 55 MMHG | HEART RATE: 87 BPM | TEMPERATURE: 98 F

## 2023-06-12 DIAGNOSIS — Z85.3 HISTORY OF BREAST CANCER: ICD-10-CM

## 2023-06-12 DIAGNOSIS — S21.101S: Primary | ICD-10-CM

## 2023-06-12 DIAGNOSIS — Z90.11 STATUS POST RIGHT MASTECTOMY: ICD-10-CM
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
## 2023-06-12 DIAGNOSIS — L59.8 RADIATION-INDUCED FIBROSIS OF SOFT TISSUE FROM THERAPEUTIC PROCEDURE: ICD-10-CM

## 2023-06-12 PROCEDURE — 97607 NEG PRS WND THR NDME<=50SQCM: CPT

## 2023-06-12 NOTE — TELEPHONE ENCOUNTER
Spoke with patient to confirm that she is scheduled for surgery with Dr. Heidi Ames on Monday June 19th. She had questions about taking Aspirin. I informed her that she should hold any over the counter medications, any Anti-inflammatory medications, and any blood thinners.

## 2023-06-16 ENCOUNTER — APPOINTMENT (OUTPATIENT)
Dept: WOUND CARE | Facility: HOSPITAL | Age: 68
End: 2023-06-16
Attending: NURSE PRACTITIONER
Payer: MEDICARE

## 2023-06-19 ENCOUNTER — APPOINTMENT (OUTPATIENT)
Dept: WOUND CARE | Facility: HOSPITAL | Age: 68
End: 2023-06-19
Attending: NURSE PRACTITIONER
Payer: MEDICARE

## 2023-06-19 ENCOUNTER — HOSPITAL ENCOUNTER (OUTPATIENT)
Facility: HOSPITAL | Age: 68
Setting detail: HOSPITAL OUTPATIENT SURGERY
Discharge: HOME | End: 2023-06-19
Attending: ORTHOPAEDIC SURGERY | Admitting: ORTHOPAEDIC SURGERY
Payer: MEDICARE

## 2023-06-19 ENCOUNTER — ANESTHESIA (OUTPATIENT)
Dept: SURGERY | Facility: HOSPITAL | Age: 68
End: 2023-06-19
Payer: MEDICARE

## 2023-06-19 ENCOUNTER — ANESTHESIA EVENT (OUTPATIENT)
Dept: SURGERY | Facility: HOSPITAL | Age: 68
End: 2023-06-19
Payer: MEDICARE

## 2023-06-19 VITALS
WEIGHT: 183 LBS | DIASTOLIC BLOOD PRESSURE: 61 MMHG | HEIGHT: 60 IN | SYSTOLIC BLOOD PRESSURE: 120 MMHG | BODY MASS INDEX: 35.93 KG/M2 | RESPIRATION RATE: 18 BRPM | TEMPERATURE: 98 F | HEART RATE: 81 BPM | OXYGEN SATURATION: 96 %

## 2023-06-19 PROCEDURE — S0077 INJECTION, CLINDAMYCIN PHOSP: HCPCS

## 2023-06-19 PROCEDURE — 0SBC4ZZ EXCISION OF RIGHT KNEE JOINT, PERCUTANEOUS ENDOSCOPIC APPROACH: ICD-10-PCS | Performed by: ORTHOPAEDIC SURGERY

## 2023-06-19 RX ORDER — SODIUM CHLORIDE, SODIUM LACTATE, POTASSIUM CHLORIDE, CALCIUM CHLORIDE 600; 310; 30; 20 MG/100ML; MG/100ML; MG/100ML; MG/100ML
INJECTION, SOLUTION INTRAVENOUS CONTINUOUS
Status: DISCONTINUED | OUTPATIENT
Start: 2023-06-19 | End: 2023-06-19

## 2023-06-19 RX ORDER — HYDROMORPHONE HYDROCHLORIDE 1 MG/ML
0.2 INJECTION, SOLUTION INTRAMUSCULAR; INTRAVENOUS; SUBCUTANEOUS EVERY 5 MIN PRN
Status: DISCONTINUED | OUTPATIENT
Start: 2023-06-19 | End: 2023-06-19

## 2023-06-19 RX ORDER — MIDAZOLAM HYDROCHLORIDE 1 MG/ML
INJECTION INTRAMUSCULAR; INTRAVENOUS AS NEEDED
Status: DISCONTINUED | OUTPATIENT
Start: 2023-06-19 | End: 2023-06-19 | Stop reason: SURG

## 2023-06-19 RX ORDER — NALOXONE HYDROCHLORIDE 0.4 MG/ML
80 INJECTION, SOLUTION INTRAMUSCULAR; INTRAVENOUS; SUBCUTANEOUS AS NEEDED
Status: DISCONTINUED | OUTPATIENT
Start: 2023-06-19 | End: 2023-06-19

## 2023-06-19 RX ORDER — ACETAMINOPHEN 500 MG
1000 TABLET ORAL ONCE
Status: COMPLETED | OUTPATIENT
Start: 2023-06-19 | End: 2023-06-19

## 2023-06-19 RX ORDER — ONDANSETRON 2 MG/ML
INJECTION INTRAMUSCULAR; INTRAVENOUS AS NEEDED
Status: DISCONTINUED | OUTPATIENT
Start: 2023-06-19 | End: 2023-06-19 | Stop reason: SURG

## 2023-06-19 RX ORDER — CLINDAMYCIN PHOSPHATE 600 MG/50ML
600 INJECTION INTRAVENOUS
Status: COMPLETED | OUTPATIENT
Start: 2023-06-20 | End: 2023-06-19

## 2023-06-19 RX ORDER — BUPIVACAINE HYDROCHLORIDE AND EPINEPHRINE 5; 5 MG/ML; UG/ML
INJECTION, SOLUTION PERINEURAL AS NEEDED
Status: DISCONTINUED | OUTPATIENT
Start: 2023-06-19 | End: 2023-06-19 | Stop reason: HOSPADM

## 2023-06-19 RX ORDER — HYDROMORPHONE HYDROCHLORIDE 1 MG/ML
0.4 INJECTION, SOLUTION INTRAMUSCULAR; INTRAVENOUS; SUBCUTANEOUS EVERY 5 MIN PRN
Status: DISCONTINUED | OUTPATIENT
Start: 2023-06-19 | End: 2023-06-19

## 2023-06-19 RX ORDER — FAMOTIDINE 20 MG/1
20 TABLET, FILM COATED ORAL ONCE
Status: COMPLETED | OUTPATIENT
Start: 2023-06-19 | End: 2023-06-19

## 2023-06-19 RX ORDER — METOCLOPRAMIDE 10 MG/1
10 TABLET ORAL ONCE
Status: COMPLETED | OUTPATIENT
Start: 2023-06-19 | End: 2023-06-19

## 2023-06-19 RX ORDER — HYDROCODONE BITARTRATE AND ACETAMINOPHEN 5; 325 MG/1; MG/1
1 TABLET ORAL EVERY 6 HOURS PRN
Status: DISCONTINUED | OUTPATIENT
Start: 2023-06-19 | End: 2023-06-19

## 2023-06-19 RX ORDER — MORPHINE SULFATE 10 MG/ML
6 INJECTION, SOLUTION INTRAMUSCULAR; INTRAVENOUS EVERY 10 MIN PRN
Status: DISCONTINUED | OUTPATIENT
Start: 2023-06-19 | End: 2023-06-19

## 2023-06-19 RX ORDER — DEXAMETHASONE SODIUM PHOSPHATE 4 MG/ML
VIAL (ML) INJECTION AS NEEDED
Status: DISCONTINUED | OUTPATIENT
Start: 2023-06-19 | End: 2023-06-19 | Stop reason: SURG

## 2023-06-19 RX ORDER — MORPHINE SULFATE 4 MG/ML
2 INJECTION, SOLUTION INTRAMUSCULAR; INTRAVENOUS EVERY 10 MIN PRN
Status: DISCONTINUED | OUTPATIENT
Start: 2023-06-19 | End: 2023-06-19

## 2023-06-19 RX ORDER — HYDROMORPHONE HYDROCHLORIDE 1 MG/ML
0.6 INJECTION, SOLUTION INTRAMUSCULAR; INTRAVENOUS; SUBCUTANEOUS EVERY 5 MIN PRN
Status: DISCONTINUED | OUTPATIENT
Start: 2023-06-19 | End: 2023-06-19

## 2023-06-19 RX ORDER — HYDROCODONE BITARTRATE AND ACETAMINOPHEN 5; 325 MG/1; MG/1
1 TABLET ORAL EVERY 6 HOURS PRN
Qty: 10 TABLET | Refills: 0 | Status: SHIPPED | OUTPATIENT
Start: 2023-06-19

## 2023-06-19 RX ORDER — MORPHINE SULFATE 4 MG/ML
4 INJECTION, SOLUTION INTRAMUSCULAR; INTRAVENOUS EVERY 10 MIN PRN
Status: DISCONTINUED | OUTPATIENT
Start: 2023-06-19 | End: 2023-06-19

## 2023-06-19 RX ADMIN — DEXAMETHASONE SODIUM PHOSPHATE 4 MG: 4 MG/ML VIAL (ML) INJECTION at 18:21:00

## 2023-06-19 RX ADMIN — CLINDAMYCIN PHOSPHATE 600 MG: 600 INJECTION INTRAVENOUS at 18:17:00

## 2023-06-19 RX ADMIN — SODIUM CHLORIDE, SODIUM LACTATE, POTASSIUM CHLORIDE, CALCIUM CHLORIDE: 600; 310; 30; 20 INJECTION, SOLUTION INTRAVENOUS at 18:03:00

## 2023-06-19 RX ADMIN — MIDAZOLAM HYDROCHLORIDE 2 MG: 1 INJECTION INTRAMUSCULAR; INTRAVENOUS at 18:09:00

## 2023-06-19 RX ADMIN — ONDANSETRON 4 MG: 2 INJECTION INTRAMUSCULAR; INTRAVENOUS at 18:21:00

## 2023-06-19 NOTE — OPERATIVE REPORT
Operative Note    Patient Name: Anastasiya Patel    Preoperative Diagnosis: Tear of medial meniscus of right knee, current, unspecified tear type, subsequent encounter [S84.393D]    Postoperative Diagnosis: Tear of medial meniscus of right knee, current, unspecified tear type, subsequent encounter [S8.855D], chondral lesion patella/MFC    Primary Surgeon: Natali Burton MD     Assistant: Rashaun Lighter    Procedures: R knee arthroscopy, PMM, chondral debridement patella and [address]    Surgical Findings: above    Anesthesia: General    Complications: none    Specimen: none    Drains: none    Condition: stable to RR    Estimated Blood Loss: Tasha Kumari MD

## 2023-06-19 NOTE — ANESTHESIA PROCEDURE NOTES
Airway  Date/Time: 6/19/2023 6:15 PM  Urgency: Elective    Airway not difficult    General Information and Staff    Patient location during procedure: OR  Anesthesiologist: Jhonny Temple MD  Performed: anesthesiologist   Performed by: Jhonny Temple MD  Authorized by: Jhonny Temple MD      Indications and Patient Condition  Indications for airway management: anesthesia  Spontaneous Ventilation: absent  Sedation level: deep  Preoxygenated: yes  Patient position: sniffing  Mask difficulty assessment: 1 - vent by mask    Final Airway Details  Final airway type: supraglottic airway      Successful airway: classic  Size 4       Number of attempts at approach: 1  Ventilation between attempts: none  Number of other approaches attempted: 0

## 2023-06-20 NOTE — OPERATIVE REPORT
[facility]    PATIENT'S NAME: Phong Slaughter   ATTENDING PHYSICIAN: Frankie Sarkar MD   OPERATING PHYSICIAN: Frankie Sarkar MD   PATIENT ACCOUNT#:   [id number]    LOCATION:  [facility] [address] PACU 2 EMHP 10  MEDICAL RECORD #:   [id number]       YOB: 1955  ADMISSION DATE:       06/19/2023      OPERATION DATE:  06/19/2023    OPERATIVE REPORT      PREOPERATIVE DIAGNOSIS:  Right knee medial meniscus tear. POSTOPERATIVE DIAGNOSIS:    1. Right knee medial meniscus tear. 2.   Right knee medial femoral condyle and patella chondral lesions. PROCEDURE:    1. Right knee arthroscopy. 2.   Arthroscopic partial medial meniscectomy. 3.   Arthroscopic chondral debridement, medial femoral condyle and patella. ASSISTANT:  Fifi Cabrera PA-C. ANESTHESIA:  General.    COMPLICATIONS:  None. BLOOD LOSS:  10 mL. SPECIMEN:  None. DRAINS:  None. INDICATIONS:  The patient is a 72-year-old female with history of right knee pain localized in the medial aspect of the knee. Preoperative physical findings, imaging studies were consistent with a tear of the medial meniscus. She failed conservative treatments including anti-inflammatory medications, therapeutic exercise, intra-articular injections, and attempts at weight loss. After discussion with the patient, the risks and benefits of proceeding with operative treatment of the right knee, she wished to proceed with surgery. FINDINGS:  Examination under anesthesia, the patient had full passive range of motion when anesthetized. Lachman sign and posterior drawer were negative. The knee was stable to varus and valgus stress at 30 degrees and full extension. ARTHROSCOPIC FINDINGS:    1. Patellofemoral joint. The undersurface of the patella had diffuse grade 3 degenerative change with partial-thickness articular cartilage fibrillation. There was a small grade 4 lesion of the lateral patellar facet.   There was fibrillation throughout the entire lateral patellar facet, femoral trochlea grade 3 degenerative change with partial-thickness articular cartilage loss. Position of the patella with central tracking was central.  2.   Notch. The ACL and PCL were unremarkable. 3.   Medial compartment. The medial femoral condyle had diffuse grade 3 degenerative change with fibrillation throughout and partial-thickness loss. Medial tibial plateau had grade 1 degenerative softening. Medial meniscus had a complex tear involving the body with both horizontal and linear components. There was radial component as well. Anterior horn unremarkable. 4.   Lateral compartment. Lateral femoral condyle, tibial plateau and meniscus had degenerative changes but no discrete tearing. There was fibrillation of the undersurface of the body of the lateral meniscus. 5.   Gutters and suprapatellar pouch unremarkable. There were no significant loose bodies or plica. OPERATIVE TECHNIQUE:  The patient was identified in the preoperative holding area. The appropriate consents were obtained. She was taken to the operating room, placed in supine position on the operating room table. After adequate general anesthesia was obtained, tourniquet was placed in the right thigh. The right lower extremity was placed on the arthroscopic leg carreno. The left lower extremity was placed in the padded well leg carreno. All bony prominences were well padded. SCD device was placed in the left lower extremity. The patient was given preoperative IV antibiotics. Right knee was preinjected with 10 mL of 0.5% Marcaine with epinephrine. The knee was prepped and draped in a sterile fashion. The extremity was exsanguinated. The tourniquet was inflated to 250 mmHg. Anterior portals were established. A superolateral outflow cannula was inserted. The camera was inserted through the lateral portal and a thorough examination of the knee joint was performed. The findings were as stated. Next, using a combination of cutting instruments, motorized shaver and radiofrequency wand, a partial medial meniscectomy was performed. Approximately 50% to 60% of the body was resected back to stable meniscal rim. Loose delaminated and fibrillated articular cartilage was debrided from the medial femoral condyle and lateral patella with a motorized shaver and radiofrequency wand. The knee was suction irrigated. Attention was paid to hemostasis. The instruments were removed. The portals were closed with 4-0 nylon sutures. A sterile dressing was applied. The patient's anesthesia was reversed. She was extubated and taken recovery room in stable condition. All sponge and instrument counts were reported as correct. The attending physician, Dr. Betsy Pro, was present and performed all critical portions of the procedure. There were no complications. The first assistant was medically necessary for the surgery. She assisted with patient positioning, she operated the arthroscope during portions of the procedure, she assisted with hemostasis and wound closure. Without the aid of the assistant, the surgical procedure would not have been possible. Dictated By Katina Pro MD  d: 06/19/2023 18:53:19  t: 06/19/2023 19:22:53  Job [id number]  O/

## 2023-06-21 ENCOUNTER — APPOINTMENT (OUTPATIENT)
Dept: WOUND CARE | Facility: HOSPITAL | Age: 68
End: 2023-06-21
Attending: NURSE PRACTITIONER
Payer: MEDICARE

## 2023-06-28 ENCOUNTER — OFFICE VISIT (OUTPATIENT)
Dept: ORTHOPEDICS CLINIC | Facility: CLINIC | Age: 68
End: 2023-06-28

## 2023-06-28 ENCOUNTER — NURSE ONLY (OUTPATIENT)
Dept: WOUND CARE | Facility: HOSPITAL | Age: 68
End: 2023-06-28
Attending: NURSE PRACTITIONER
Payer: MEDICARE

## 2023-06-28 ENCOUNTER — HOSPITAL ENCOUNTER (OUTPATIENT)
Dept: ULTRASOUND IMAGING | Facility: HOSPITAL | Age: 68
Discharge: HOME | End: 2023-06-28
Payer: MEDICARE

## 2023-06-28 VITALS
DIASTOLIC BLOOD PRESSURE: 87 MMHG | RESPIRATION RATE: 19 BRPM | TEMPERATURE: 98 F | SYSTOLIC BLOOD PRESSURE: 149 MMHG | HEART RATE: 88 BPM | OXYGEN SATURATION: 98 %

## 2023-06-28 DIAGNOSIS — M79.661 RIGHT CALF PAIN: ICD-10-CM

## 2023-06-28 DIAGNOSIS — Z47.89 ORTHOPEDIC AFTERCARE: Primary | ICD-10-CM

## 2023-06-28 DIAGNOSIS — S21.101S: ICD-10-CM

## 2023-06-28 PROCEDURE — 99024 POSTOP FOLLOW-UP VISIT: CPT

## 2023-06-28 PROCEDURE — 93971 EXTREMITY STUDY: CPT

## 2023-06-28 PROCEDURE — 97607 NEG PRS WND THR NDME<=50SQCM: CPT

## 2023-06-29 ENCOUNTER — TELEPHONE (OUTPATIENT)
Dept: INTERNAL MEDICINE CLINIC | Facility: CLINIC | Age: 68
End: 2023-06-29

## 2023-06-29 RX ORDER — NITROFURANTOIN 25; 75 MG/1; MG/1
100 CAPSULE ORAL 2 TIMES DAILY
Qty: 14 CAPSULE | Refills: 0 | Status: SHIPPED | OUTPATIENT
Start: 2023-06-29 | End: 2023-07-06

## 2023-07-03 ENCOUNTER — TELEPHONE (OUTPATIENT)
Dept: PHYSICAL THERAPY | Facility: HOSPITAL | Age: 68
End: 2023-07-03

## 2023-07-05 ENCOUNTER — NURSE ONLY (OUTPATIENT)
Dept: WOUND CARE | Facility: HOSPITAL | Age: 68
End: 2023-07-05
Attending: NURSE PRACTITIONER
Payer: MEDICARE

## 2023-07-05 ENCOUNTER — OFFICE VISIT (OUTPATIENT)
Dept: PHYSICAL THERAPY | Age: 68
End: 2023-07-05
Payer: MEDICARE

## 2023-07-05 VITALS
SYSTOLIC BLOOD PRESSURE: 137 MMHG | DIASTOLIC BLOOD PRESSURE: 66 MMHG | TEMPERATURE: 98 F | OXYGEN SATURATION: 96 % | HEART RATE: 95 BPM

## 2023-07-05 DIAGNOSIS — Z47.89 ORTHOPEDIC AFTERCARE: Primary | ICD-10-CM

## 2023-07-05 DIAGNOSIS — S21.101S: Primary | ICD-10-CM

## 2023-07-05 PROCEDURE — 97110 THERAPEUTIC EXERCISES: CPT

## 2023-07-05 PROCEDURE — 97161 PT EVAL LOW COMPLEX 20 MIN: CPT

## 2023-07-05 PROCEDURE — 99213 OFFICE O/P EST LOW 20 MIN: CPT | Performed by: NURSE PRACTITIONER

## 2023-07-07 ENCOUNTER — APPOINTMENT (OUTPATIENT)
Dept: PHYSICAL THERAPY | Age: 68
End: 2023-07-07
Payer: MEDICARE

## 2023-08-01 ENCOUNTER — OFFICE VISIT (OUTPATIENT)
Dept: WOUND CARE | Facility: HOSPITAL | Age: 68
End: 2023-08-01
Attending: NURSE PRACTITIONER
Payer: MEDICARE

## 2023-08-01 VITALS
HEART RATE: 95 BPM | RESPIRATION RATE: 18 BRPM | TEMPERATURE: 97 F | OXYGEN SATURATION: 98 % | DIASTOLIC BLOOD PRESSURE: 67 MMHG | SYSTOLIC BLOOD PRESSURE: 138 MMHG

## 2023-08-01 DIAGNOSIS — S21.101S: Primary | ICD-10-CM

## 2023-08-01 PROCEDURE — 99213 OFFICE O/P EST LOW 20 MIN: CPT | Performed by: NURSE PRACTITIONER

## 2023-08-02 ENCOUNTER — OFFICE VISIT (OUTPATIENT)
Dept: ORTHOPEDICS CLINIC | Facility: CLINIC | Age: 68
End: 2023-08-02

## 2023-08-02 ENCOUNTER — OFFICE VISIT (OUTPATIENT)
Dept: INTERNAL MEDICINE CLINIC | Facility: CLINIC | Age: 68
End: 2023-08-02
Payer: MEDICARE

## 2023-08-02 VITALS
OXYGEN SATURATION: 97 % | WEIGHT: 186 LBS | DIASTOLIC BLOOD PRESSURE: 76 MMHG | SYSTOLIC BLOOD PRESSURE: 128 MMHG | BODY MASS INDEX: 36 KG/M2 | HEART RATE: 83 BPM

## 2023-08-02 DIAGNOSIS — E66.9 OBESITY (BMI 30-39.9): ICD-10-CM

## 2023-08-02 DIAGNOSIS — I10 HYPERTENSION, UNSPECIFIED TYPE: ICD-10-CM

## 2023-08-02 DIAGNOSIS — M25.569 CHRONIC KNEE PAIN, UNSPECIFIED LATERALITY: Primary | ICD-10-CM

## 2023-08-02 DIAGNOSIS — G89.29 CHRONIC KNEE PAIN, UNSPECIFIED LATERALITY: Primary | ICD-10-CM

## 2023-08-02 DIAGNOSIS — S83.241D TEAR OF MEDIAL MENISCUS OF RIGHT KNEE, CURRENT, UNSPECIFIED TEAR TYPE, SUBSEQUENT ENCOUNTER: Primary | ICD-10-CM

## 2023-08-02 DIAGNOSIS — N64.9 BREAST LESION: ICD-10-CM

## 2023-08-02 DIAGNOSIS — M17.11 PRIMARY OSTEOARTHRITIS OF RIGHT KNEE: ICD-10-CM

## 2023-08-02 DIAGNOSIS — Z02.89 ENCOUNTER FOR COMPLETION OF FORM WITH PATIENT: ICD-10-CM

## 2023-08-02 PROCEDURE — 99024 POSTOP FOLLOW-UP VISIT: CPT | Performed by: ORTHOPAEDIC SURGERY

## 2023-08-02 PROCEDURE — 99215 OFFICE O/P EST HI 40 MIN: CPT | Performed by: FAMILY MEDICINE

## 2023-08-02 NOTE — PROGRESS NOTES
NURSING INTAKE COMMENTS: Patient presents with:  Post-Op: Right knee arthroscopy done on 6/19. Patient denies any pain at this time. Patient usually has pain when trying to bend her knee. HPI: This 74year old female presents today with complaints of right knee surgery follow-up. She is now 6 weeks postoperative from a partial Bashir meniscectomy and chondral debridement. She reports improvement in her preoperative symptoms. She continues to have some pain in the posterior knee as well as mild swelling. She is progressing with outpatient therapy. She takes no medication for pain. Past Medical History:   Diagnosis Date    Biliary calculus 01/01/1978    Removed gallbladder    Breast cancer [facility])     Carotid artery stenosis     R , s/p radiation    Cirrhosis of liver ([facility] [address] 01/01/2013    Disorder of liver     Disorder of thyroid     History of blood transfusion 01/01/2005    Meniscus, medial, bucket handle tear, old     Obesity     Osteoarthritis     Other and unspecified hyperlipidemia     Personal history of antineoplastic chemotherapy     Presence of implantable pulmonary artery pressure and heart rate monitoring system     S/P TRAM (transverse rectus abdominis muscle) flap breast reconstruction     Stroke ([facility] [address] 2010 and 2014    TIA    Thyroid disease     TIA (transient ischemic attack)      Past Surgical History:   Procedure Laterality Date    APPENDECTOMY      BREAST RECONSTRUCTION  2005    R Side, Multiple Procedures     BREAST SURGERY      CHEMOTHERAPY      CHOLECYSTECTOMY  1978    MASTECTOMY RIGHT      OTHER      masectomy    RADIATION RIGHT      REMOVAL GALLBLADDER      STENT PLACEMT RETRO CAROTID      2011--right carotid     Current Outpatient Medications   Medication Sig Dispense Refill    LEVOTHYROXINE 125 MCG Oral Tab TAKE 1 TABLET BY MOUTH ONCE DAILY BEFORE BREAKFAST 90 tablet 0    aspirin 325 MG Oral Tab Take 1 tablet (325 mg total) by mouth daily.          Penicillins             ANAPHYLAXIS Comment:Pt also had blue dye at same time and is not sure             which caused her rxn. Sulfa Antibiotics       ANAPHYLAXIS    Comment:SOME TYPE OF BLUE DYE USED IN BIOPSY. Pt had pcn             at same time and is not sure which caused her rxn. Blue Dye                ANAPHYLAXIS    Comment:Patient reports not IV dye used during CT             angiogram/Cerebral angiograms - had both in the             past without reaction. Patient reports it was a             dye used in 2005 to trace the lymphatic system             during a mastectomy.   Family History   Problem Relation Age of Onset    Other (Other) Mother         Aneurysm 1974    Cancer Maternal Aunt         Brain    Breast Cancer Self 54       Social History    Occupational History      Occupation: Works at [employer] at [employer]      Smoking status: Light Smoker        Years: 30.00        Types: Cigarettes      Smokeless tobacco: Never    Vaping Use      Vaping Use: Never used    Substance and Sexual Activity      Alcohol use: No        Alcohol/week: 0.0 standard drinks of alcohol      Drug use: No      Sexual activity: Not on file       Review of Systems:  GENERAL: denies fevers, chills, night sweats, fatigue, unintentional weight loss/gain  SKIN: denies skin lesions, open sores, rash  HEENT:denies recent vision change, new nasal congestion,hearing loss, tinnitus, sore throat, headaches  RESPIRATORY: denies new shortness of breath, cough, asthma, wheezing  CARDIOVASCULAR: denies chest pain, leg cramps with exertion, palpitations, leg swelling  GI: denies abdominal pain, nausea, vomiting, diarrhea, constipation, hematochezia, worsening heartburn or stomach ulcers  : denies dysuria, hematuria, incontinence, increased frequency, urgency, difficulty urinating  MUSCULOSKELETAL: denies musculoskeletal complaints other than in HPI  NEURO: denies numbness, tingling, weakness, balance issues, dizziness, memory loss  PSYCHIATRIC: denies Hx of depression, anxiety, other psychiatric disorders  HEMATOLOGIC: denies blood clots, anemia, blood clotting disorders, blood transfusion  ENDOCRINE: denies autoimmune disease, thyroid issues, or diabetes  ALLERGY: denies asthma, seasonal allergies    Physical Examination:    LMP 01/01/2005 (Exact Date)   Constitutional: appears well hydrated, alert and responsive, no acute distress noted  Extremities: Right knee trace effusion. Tender at the posterior medial joint line. Medial joint line in the mid aspect is nontender. No pain with rotation maneuvers. Minimal pain with passive flexion to 120 degrees. Neurological: Light touch and pinprick sensation intact throughout the lower extremities. Ankle dorsiflexion plantarflexion EHL knee extension and hip flexion strength are 5 out of 5 bilaterally. No clonus. Imaging:   No results found. Labs:  Lab Results   Component Value Date    WBC 4.8 05/01/2023    HGB 14.1 05/01/2023    .0 (L) 05/01/2023      Lab Results   Component Value Date    GLU 80 05/01/2023    BUN 18 05/01/2023    CREATSERUM 0.60 05/01/2023    GFR >60 01/13/2016    GFRNAA 97 04/12/2021    GFRAA 112 04/12/2021        Assessment and Plan:  Diagnoses and all orders for this visit:    Tear of medial meniscus of right knee, current, unspecified tear type, subsequent encounter    Primary osteoarthritis of right knee        Assessment: Healing right knee after partial medial meniscectomy, ongoing effusion    Plan: I advised continued outpatient physical therapy focused on quadricep strength training and swelling control. Continue icing and oral anti-inflammatory use. I reassured her that the discomfort she is currently experiencing is likely related to ongoing effusion will likely dissipate over the next 3 months. Follow-up with me in 6 weeks for further follow-up care. Follow Up: Return in about 6 weeks (around 9/13/2023).     Harpreet Hester MD

## 2023-08-03 ENCOUNTER — OFFICE VISIT (OUTPATIENT)
Dept: PHYSICAL THERAPY | Age: 68
End: 2023-08-03
Payer: MEDICARE

## 2023-08-03 PROCEDURE — 97110 THERAPEUTIC EXERCISES: CPT

## 2023-08-03 PROCEDURE — 97014 ELECTRIC STIMULATION THERAPY: CPT

## 2023-08-03 NOTE — PROGRESS NOTES
Diagnosis:  s/p right medial meniscectomy, arthroscopy       Next MD visit: none scheduled  Fall Risk: standard         Precautions: n/a          Medication Changes since last visit?: No    Subjective: Patient reports 12/10 yesterday and is unsure why she had so much pain. Pt states she was out and about on her feet most of the day. Patient reports her knee is doing better today and rates her knee pain as 2/10 after icing her knee. She reports her knee is really swollen and most of her pain is located in the posterior aspect. Pt states she saw her orthopedic who stated she will follow up in a few weeks to possibly drain her knee or receive an injection if needed. Objective:     Date: 8/3/2023  Visit #: 2/10 (medicare)   HEP   - provided RTB & YTB for home use   Therapeutic Exercise   - supine R/L SLR with YTB above knees 1 x 10 ea  - supine R/L SLR with 2# ankle weights 1 x 10 ea  - supine SB knee flexion AAROM 10x  - supine SB bridges 10x  - sidelying R/L hip abduction with 2# ankle wt 1 x 10 ea  - NuStep level 5 (UEs and LEs) x 7 minutes   Manual Therapy   -   Therapeutic Activity   -    Modalities   - estim/IFC with ice to R knee in supine  MHz x 15 minutes             Assessment: Session focused on global R knee ROM and strengthening. Initiated estim/IFC to assist with reducing edema.        Plan: continue PT    Charges:  Ex 2 Estim 1      Total Timed Treatment: 25 min  Total Treatment Time: 40 min

## 2023-08-07 ENCOUNTER — APPOINTMENT (OUTPATIENT)
Dept: PHYSICAL THERAPY | Age: 68
End: 2023-08-07
Payer: MEDICARE

## 2023-08-08 ENCOUNTER — APPOINTMENT (OUTPATIENT)
Dept: WOUND CARE | Facility: HOSPITAL | Age: 68
End: 2023-08-08
Attending: NURSE PRACTITIONER
Payer: MEDICARE

## 2023-08-08 ENCOUNTER — OFFICE VISIT (OUTPATIENT)
Dept: SURGERY | Facility: CLINIC | Age: 68
End: 2023-08-08
Payer: MEDICARE

## 2023-08-08 VITALS — WEIGHT: 185.38 LBS | HEIGHT: 59.06 IN | BODY MASS INDEX: 37.37 KG/M2

## 2023-08-08 DIAGNOSIS — S21.101S: Primary | ICD-10-CM

## 2023-08-08 PROCEDURE — 99212 OFFICE O/P EST SF 10 MIN: CPT | Performed by: SURGERY

## 2023-08-08 NOTE — PROGRESS NOTES
Hu Fulton is a 74year old female who presents today for a follow-up. Her last visit, the patient has stopped smoking. She does occasionally chew Nicorette gum. She is also lost 12 pounds. She has been undergoing wound care therapy to her right chest wall wound. She denies fever and chills. Physical Examination:   08/08/23  1502   Weight: 84.1 kg (185 lb 6.4 oz)   Height: 1.5 m (4' 11.06\")     Breasts: Absence of right breast is noted. There is a skin graft in place with a superficial ulceration measuring approximately 5 cm x 1 cm. Significant axillary and back lipodystrophy is noted. The latissimus dorsi muscles innervated. Assessment and Plan:  I have recommended the patient continue her weight loss endeavors prior to consideration for latissimus dorsi flap. We also discussed the need for complete abstinence from nicotine products prior to proceeding with surgery. The patient is contemplating a contralateral prophylactic mastectomy and will meet Dr. Wes Paris for discussion. She will return for reevaluation in 4 to 6 weeks. The plan was reviewed with the patient and questions were answered.

## 2023-08-08 NOTE — PATIENT INSTRUCTIONS
THIS IS NOT A PRE-OP    Surgeon:         Dr. Cruz Cruz                                        Tel:         [phone]                                  Fax:        [phone]    Surgery/Procedure: Right latissimus dorsi flap     Dx Code:     Hospital:  [facility]: [address] [place] [address] Blvd, [place], [address]           [phone]  [facility]: [address], [address], [facility]               [phone]    1. Someone will need to drive you to and from the hospital if your procedure is outpatient. 2.Do not drink alcohol or smoke 24 hours prior to your procedure. 3. Bring a picture ID and your insurance card. 4. You will be contacted by the hospital the day before to confirm the procedure time and location. 5. The hospital will also contact you approximately one week before surgery to schedule your COVID test (only if surgery is inpatient/overnight stay). Please inform us if you develop any Covid-19 like symptoms, test positive or have been exposed for Covid- 19 prior to surgery. 6. Do not take any herbal supplements or blood thinners at least one week before your procedure/surgery. This includes NSAID's (aspirin, baby aspirin, Motrin, Ibuprofen, Aleve, Advil, Naproxen, etc), Plavix, fish oil, vitamin E, turmeric, CoQ10, or green tea supplements, etc. *TYLENOL or acetaminophen is ok to take*    7. PRE-OPERATIVE TESTING: History and physical with medical clearance is REQUIRED within 30 days of the surgery date and is mandatory per Dr. Nando Taylor. *If this is not done, your surgery will be postponed*  MEDICAL CLEARANCE WITH  ____  CBC  CMP  EKG    8. Please inform us if you start or change any medications at least one week before surgery (ex: blood thinners, weight loss medications, diabetic medications, herbal supplements, etc)    9. Does patient have diagnosis of sleep apnea?     [   ] Yes     [   ]  No    Consent obtained  Photos taken on _________

## 2023-08-10 ENCOUNTER — OFFICE VISIT (OUTPATIENT)
Dept: PHYSICAL THERAPY | Age: 68
End: 2023-08-10
Attending: ORTHOPAEDIC SURGERY
Payer: MEDICARE

## 2023-08-10 PROCEDURE — 97110 THERAPEUTIC EXERCISES: CPT

## 2023-08-10 NOTE — PROGRESS NOTES
Diagnosis:  s/p right medial meniscectomy, arthroscopy       Next MD visit: none scheduled  Fall Risk: standard         Precautions: n/a          Medication Changes since last visit?: No    Subjective: Patient reports 1/10 knee pain today. She reports she can negotiate stairs now with less difficulty and has noticed reduced edema. PT reports the back of her knee is still sore at times. She reports her other knee is actually bothering her a bit more. Objective:     Date: 8/10/2023  Visit #: 3/10 (medicare) Date: 8/3/2023  Visit #: 2/10 (medicare)   HEP    - provided RTB & YTB for home use   Therapeutic Exercise   - supine R/L SLR with 2# ankle weights 2 x 10 ea  - supine SB knee flexion AAROM 2 x 20 reps  - supine SB bridges 10x  - supine R QS 1 x 15 5 sec holds  - sidelying R/L hip abduction with 2# ankle wt 2 x 10 ea  - standing B heel raises 2 x 10  - standing B gastroc stretch on slant board level 3 3 x 30 sec holds  - standing R/L hip extension/hip extension with RTB at shins 2 x 10 ea  - standing R hip flexion with RTB at shins 2 x 10  - shuttle machine B Knee ext/flx 5 bands 2 x 10  - NuStep level 5 (UEs and LEs) x 10 minutes - supine R/L SLR with YTB above knees 1 x 10 ea  - supine R/L SLR with 2# ankle weights 1 x 10 ea  - supine SB knee flexion AAROM 10x  - supine SB bridges 10x  - sidelying R/L hip abduction with 2# ankle wt 1 x 10 ea  - NuStep level 5 (UEs and LEs) x 7 minutes   Manual Therapy    -   Therapeutic Activity    -    Modalities    - estim/IFC with ice to R knee in supine  MHz x 15 minutes              Assessment: Initiated shuttle machine today to progress global LE strength.        Plan: continue PT    Charges:  Ex 3  Total Timed Treatment: 43 min  Total Treatment Time: 43 min

## 2023-08-14 DIAGNOSIS — E03.9 HYPOTHYROIDISM, UNSPECIFIED TYPE: ICD-10-CM

## 2023-08-14 RX ORDER — LEVOTHYROXINE SODIUM 0.12 MG/1
125 TABLET ORAL
Qty: 90 TABLET | Refills: 0 | Status: SHIPPED | OUTPATIENT
Start: 2023-08-14

## 2023-08-15 ENCOUNTER — APPOINTMENT (OUTPATIENT)
Dept: WOUND CARE | Facility: HOSPITAL | Age: 68
End: 2023-08-15
Attending: NURSE PRACTITIONER
Payer: MEDICARE

## 2023-08-15 ENCOUNTER — TELEPHONE (OUTPATIENT)
Facility: CLINIC | Age: 68
End: 2023-08-15

## 2023-08-15 NOTE — TELEPHONE ENCOUNTER
1st overdue reminder letter sent out via [facility] and mail.     US LIVER ([id number]) (Order #[id number]) on 11/16/22

## 2023-08-16 ENCOUNTER — OFFICE VISIT (OUTPATIENT)
Dept: PHYSICAL THERAPY | Age: 68
End: 2023-08-16
Attending: FAMILY MEDICINE
Payer: MEDICARE

## 2023-08-16 PROCEDURE — 97110 THERAPEUTIC EXERCISES: CPT

## 2023-08-16 NOTE — PROGRESS NOTES
Diagnosis:  s/p right medial meniscectomy, arthroscopy       Next MD visit: none scheduled  Fall Risk: standard         Precautions: n/a          Medication Changes since last visit?: No    Subjective: Patient reports 3/10 knee pain today. She reports compliance with her home program.  Pt states she did an aerobic work out this morning so she is a bit sore.      Objective:     Date: 8/16/2023  Visit #: 4/10 (medicare) Date: 8/10/2023  Visit #: 3/10 (medicare) Date: 8/3/2023  Visit #: 2/10 (medicare)   HEP     - provided RTB & YTB for home use   Therapeutic Exercise   - supine R SLR with 2# ankle weights 2 x 10 ea  - supine SB knee flexion AAROM 2 x 20 reps  - supine SB bridges 2 x 10  - supine R QS 1 x 10 10 sec holds  - standing B heel raises on slant board level 4 1 x 10  - standing B gastroc stretch on slant board level 4 3 x 30 sec holds  - standing R/L hip abduction with RTB at shins 2 x 10 ea  - shuttle machine B Knee ext/flx 5 bands 2 x 10  - shuttle machine R/L knee ext/flx 3 bands 2 x 10 ea  - NuStep level 4 (LEs only) x 9 minutes - supine R/L SLR with 2# ankle weights 2 x 10 ea  - supine SB knee flexion AAROM 2 x 20 reps  - supine SB bridges 10x  - supine R QS 1 x 15 5 sec holds  - sidelying R/L hip abduction with 2# ankle wt 2 x 10 ea  - standing B heel raises 2 x 10  - standing B gastroc stretch on slant board level 3 3 x 30 sec holds  - standing R/L hip extension/hip extension with RTB at shins 2 x 10 ea  - standing R hip flexion with RTB at shins 2 x 10  - shuttle machine B Knee ext/flx 5 bands 2 x 10  - NuStep level 5 (UEs and LEs) x 10 minutes - supine R/L SLR with YTB above knees 1 x 10 ea  - supine R/L SLR with 2# ankle weights 1 x 10 ea  - supine SB knee flexion AAROM 10x  - supine SB bridges 10x  - sidelying R/L hip abduction with 2# ankle wt 1 x 10 ea  - NuStep level 5 (UEs and LEs) x 7 minutes   Manual Therapy     -   Therapeutic Activity     -    Modalities     - estim/IFC with ice to R knee in supine  MHz x 15 minutes             Assessment: Initiated single leg knee extension on shuttle machine today.      Plan: continue PT    Charges:  Ex 3  Total Timed Treatment: 46 min  Total Treatment Time: 46 min

## 2023-08-22 ENCOUNTER — APPOINTMENT (OUTPATIENT)
Dept: WOUND CARE | Facility: HOSPITAL | Age: 68
End: 2023-08-22
Attending: NURSE PRACTITIONER
Payer: MEDICARE

## 2023-08-25 ENCOUNTER — OFFICE VISIT (OUTPATIENT)
Dept: PHYSICAL THERAPY | Age: 68
End: 2023-08-25
Attending: ORTHOPAEDIC SURGERY
Payer: MEDICARE

## 2023-08-25 PROCEDURE — 97110 THERAPEUTIC EXERCISES: CPT

## 2023-08-25 NOTE — PROGRESS NOTES
Diagnosis:  s/p right medial meniscectomy, arthroscopy       Next MD visit: none scheduled  Fall Risk: standard         Precautions: n/a          Medication Changes since last visit?: No    Subjective: Patient reports her knees and whole body are hurting with the heat these past two days. Patient reports 5/10 knee pain.      Objective:     Date: 8/25/2023  Visit #: 5/10 (medicare) Date: 8/16/2023  Visit #: 4/10 (medicare) Date: 8/10/2023  Visit #: 3/10 (medicare)   HEP        Therapeutic Exercise   - supine R SLR with 2# ankle weights 3 x 10   - supine R QS 1 x 10 10 sec holds  - supine R/L hamstring stretch with strap 3 x 30 sec holds ea  - standing B heel raises on slant board level 4 2 x 10  - standing B gastroc stretch on slant board level 4 3 x 30 sec holds  - standing R/L hip abduction with RTB at shins 2 x 10 ea  - standing R/L hip extension with RTB at shins 2 x 10   - standing R hip flexion with RTB at ankles 2 x 10  - shuttle machine B Knee ext/flx 5 bands 2 x 10  - shuttle machine R/L knee ext/flx 4 bands 2 x 10 ea  - NuStep level 5 (LEs only) x 10 minutes   - supine R SLR with 2# ankle weights 2 x 10 ea  - supine SB knee flexion AAROM 2 x 20 reps  - supine SB bridges 2 x 10  - supine R QS 1 x 10 10 sec holds  - standing B heel raises on slant board level 4 1 x 10  - standing B gastroc stretch on slant board level 4 3 x 30 sec holds  - standing R/L hip abduction with RTB at shins 2 x 10 ea  - shuttle machine B Knee ext/flx 5 bands 2 x 10  - shuttle machine R/L knee ext/flx 3 bands 2 x 10 ea  - NuStep level 4 (LEs only) x 9 minutes - supine R/L SLR with 2# ankle weights 2 x 10 ea  - supine SB knee flexion AAROM 2 x 20 reps  - supine SB bridges 10x  - supine R QS 1 x 15 5 sec holds  - sidelying R/L hip abduction with 2# ankle wt 2 x 10 ea  - standing B heel raises 2 x 10  - standing B gastroc stretch on slant board level 3 3 x 30 sec holds  - standing R/L hip extension/hip extension with RTB at shins 2 x 10 ea  - standing R hip flexion with RTB at shins 2 x 10  - shuttle machine B Knee ext/flx 5 bands 2 x 10  - NuStep level 5 (UEs and LEs) x 10 minutes   Manual Therapy        Therapeutic Activity        Modalities                  Assessment: Continued with global RLE strengthening. Advanced resistance on NuStep and shuttle today to further advance strength as tolerated.     Plan: continue PT    Charges:  Ex 3  Total Timed Treatment: 48 min  Total Treatment Time: 48 min

## 2023-08-28 ENCOUNTER — OFFICE VISIT (OUTPATIENT)
Dept: SURGERY | Facility: CLINIC | Age: 68
End: 2023-08-28
Payer: MEDICARE

## 2023-08-28 VITALS
WEIGHT: 184.81 LBS | BODY MASS INDEX: 37.26 KG/M2 | RESPIRATION RATE: 16 BRPM | SYSTOLIC BLOOD PRESSURE: 124 MMHG | TEMPERATURE: 97 F | OXYGEN SATURATION: 100 % | DIASTOLIC BLOOD PRESSURE: 62 MMHG | HEART RATE: 81 BPM | HEIGHT: 59.06 IN

## 2023-08-28 DIAGNOSIS — Z17.0 MALIGNANT NEOPLASM OF RIGHT BREAST IN FEMALE, ESTROGEN RECEPTOR POSITIVE, UNSPECIFIED SITE OF BREAST: ICD-10-CM

## 2023-08-28 DIAGNOSIS — C50.911 MALIGNANT NEOPLASM OF RIGHT BREAST IN FEMALE, ESTROGEN RECEPTOR POSITIVE, UNSPECIFIED SITE OF BREAST: ICD-10-CM

## 2023-08-28 DIAGNOSIS — S21.101S: Primary | ICD-10-CM

## 2023-08-28 PROCEDURE — 99215 OFFICE O/P EST HI 40 MIN: CPT | Performed by: SURGERY

## 2023-08-29 ENCOUNTER — APPOINTMENT (OUTPATIENT)
Dept: WOUND CARE | Facility: HOSPITAL | Age: 68
End: 2023-08-29
Attending: NURSE PRACTITIONER
Payer: MEDICARE

## 2023-08-29 ENCOUNTER — APPOINTMENT (OUTPATIENT)
Dept: PHYSICAL THERAPY | Age: 68
End: 2023-08-29
Attending: ORTHOPAEDIC SURGERY
Payer: MEDICARE

## 2023-09-05 ENCOUNTER — APPOINTMENT (OUTPATIENT)
Dept: WOUND CARE | Facility: HOSPITAL | Age: 68
End: 2023-09-05
Attending: NURSE PRACTITIONER
Payer: MEDICARE

## 2023-09-05 ENCOUNTER — OFFICE VISIT (OUTPATIENT)
Dept: SURGERY | Facility: CLINIC | Age: 68
End: 2023-09-05
Payer: MEDICARE

## 2023-09-05 VITALS — BODY MASS INDEX: 37.37 KG/M2 | WEIGHT: 185.38 LBS | HEIGHT: 58.86 IN

## 2023-09-05 DIAGNOSIS — S21.101A OPEN CHEST WOUND, RIGHT, INITIAL ENCOUNTER: Primary | ICD-10-CM

## 2023-09-05 PROCEDURE — 99212 OFFICE O/P EST SF 10 MIN: CPT | Performed by: SURGERY

## 2023-09-05 NOTE — PROGRESS NOTES
Chuck Juarez is a 74year old female who presents today for a follow-up. She reports that she has not used any nicotine product since her last visit. She is attempting weight loss but reports that has been difficult given her smoking cessation. She is seen Dr. Ezra Somers and discussed a left prophylactic mastectomy. She returns today to discuss options for right chest wall wound closure. Physical Examination:   09/05/23  1500   Weight: 84.1 kg (185 lb 6.4 oz)   Height: 1.495 m (4' 10.86\")     The right chest wound shows an adherent contracted skin graft with a few superficial ulcerations measuring proxy 1 cm in greatest diameter and approximately 4 cm in greatest length. The right latissimus dorsi muscle is innervated. Assessment and Plan:  We discussed continue local wound care. We discussed tentatively proceeding with latissimus dorsi flap in the upcoming months. The patient was encouraged to continue her weight loss. We will discuss with Dr. Ezra Somers regarding performing the mastectomy at the time of her latissimus dorsi reconstruction. Plan is reviewed with the patient and questions were answered.

## 2023-09-11 ENCOUNTER — APPOINTMENT (OUTPATIENT)
Dept: PHYSICAL THERAPY | Age: 68
End: 2023-09-11
Attending: FAMILY MEDICINE
Payer: MEDICARE

## 2023-09-11 ENCOUNTER — TELEPHONE (OUTPATIENT)
Dept: INTERNAL MEDICINE CLINIC | Facility: CLINIC | Age: 68
End: 2023-09-11

## 2023-09-11 NOTE — TELEPHONE ENCOUNTER
Received call from [address] of [address] [place] [address] [phone], regarding [facility] paperwork that was faxed to us 9/1/23 to see if it was received. Unable to locate paperwork, so it will be re faxed today.

## 2023-09-14 ENCOUNTER — TELEPHONE (OUTPATIENT)
Dept: INTERNAL MEDICINE CLINIC | Facility: CLINIC | Age: 68
End: 2023-09-14

## 2023-09-14 NOTE — TELEPHONE ENCOUNTER
Received forms via fax from [employer] Robert [phone]. Forms sent via email and interoffice mail for  . Form fee not collected.

## 2023-09-19 ENCOUNTER — TELEPHONE (OUTPATIENT)
Dept: INTERNAL MEDICINE CLINIC | Facility: CLINIC | Age: 68
End: 2023-09-19

## 2023-09-25 ENCOUNTER — OFFICE VISIT (OUTPATIENT)
Dept: INTERNAL MEDICINE CLINIC | Facility: CLINIC | Age: 68
End: 2023-09-25
Payer: MEDICARE

## 2023-09-25 VITALS
HEART RATE: 82 BPM | OXYGEN SATURATION: 98 % | WEIGHT: 184.81 LBS | SYSTOLIC BLOOD PRESSURE: 120 MMHG | HEIGHT: 58 IN | DIASTOLIC BLOOD PRESSURE: 64 MMHG | BODY MASS INDEX: 38.79 KG/M2

## 2023-09-25 DIAGNOSIS — N64.9 BREAST LESION: Primary | ICD-10-CM

## 2023-09-25 DIAGNOSIS — M25.569 CHRONIC KNEE PAIN, UNSPECIFIED LATERALITY: ICD-10-CM

## 2023-09-25 DIAGNOSIS — G45.9 TIA (TRANSIENT ISCHEMIC ATTACK): ICD-10-CM

## 2023-09-25 DIAGNOSIS — G89.29 CHRONIC KNEE PAIN, UNSPECIFIED LATERALITY: ICD-10-CM

## 2023-09-25 DIAGNOSIS — E03.9 HYPOTHYROIDISM, UNSPECIFIED TYPE: ICD-10-CM

## 2023-09-25 DIAGNOSIS — E66.9 OBESITY (BMI 30-39.9): ICD-10-CM

## 2023-09-25 PROCEDURE — 99214 OFFICE O/P EST MOD 30 MIN: CPT | Performed by: FAMILY MEDICINE

## 2023-09-25 RX ORDER — ATORVASTATIN CALCIUM 20 MG/1
20 TABLET, FILM COATED ORAL NIGHTLY
Qty: 90 TABLET | Refills: 0 | Status: SHIPPED | OUTPATIENT
Start: 2023-09-25

## 2023-09-25 NOTE — TELEPHONE ENCOUNTER
Received forms via fax from [employer]. Forms sent via email and interoffice mail for  . Form fee not collected.

## 2023-09-28 NOTE — TELEPHONE ENCOUNTER
LMTCB regarding disab paper work received. Not sure if these are for a continuation of disab or if these are for surgery. Verify who forms should be going too either pcp or ortho?

## 2023-09-28 NOTE — TELEPHONE ENCOUNTER
Patient called back stating this is a Continuous leave for Disability under , She is seeing Vincent Cantu regarding her condition/surgery and Dr will determine if she will need surgery again.

## 2023-09-28 NOTE — TELEPHONE ENCOUNTER
Dr. Cliff Soria     Please sign off on form if you agree to: disab questionnaire extending pt time off until 10/5/23  (Please place your signature on the first page only)    -From your Inbasket, Highlight the patient and click Chart   -Double click the 3/44/69 Forms Completion telephone encounter  -Cameron Huber down to the Media section   -Click the blue Hyperlink: Disab Questionaire 8/94/23    -Click Acknowledge located at the bottom right corner (if you do not see acknowledge, try maximizing your window)   -Drag the mouse into the blank space of the document and a + sign will appear. Left click to   electronically sign the document.      Thank you,  [email or web address]

## 2023-09-29 NOTE — TELEPHONE ENCOUNTER
Pts disab questionnaire completed and revised previous disab faxed to [employer] at [phone].

## 2023-10-03 ENCOUNTER — OFFICE VISIT (OUTPATIENT)
Dept: SURGERY | Facility: CLINIC | Age: 68
End: 2023-10-03
Payer: MEDICARE

## 2023-10-03 VITALS — HEIGHT: 59.06 IN | WEIGHT: 186.38 LBS | BODY MASS INDEX: 37.57 KG/M2

## 2023-10-03 DIAGNOSIS — S21.101A OPEN CHEST WOUND, RIGHT, INITIAL ENCOUNTER: Primary | ICD-10-CM

## 2023-10-03 PROCEDURE — 99212 OFFICE O/P EST SF 10 MIN: CPT | Performed by: SURGERY

## 2023-10-03 NOTE — PATIENT INSTRUCTIONS
Surgeon:         Dr. Clement Peters                                        Tel:         [phone]                                  Fax:        [phone]    Surgery/Procedure: Right latissimus dorsi flap, 3 hours, inpatient, general anesthesia, Joint with Dr. Jeniffer Moyer who will be doing a left prophylactic mastectomy    Dx Code: Z90.11    Hospital:  [facility]: [address] [place] [address], [place], [address] [place]            [phone]  [facility]: [address], [facility]               [phone]    1. Someone will need to drive you to and from the hospital if your procedure is outpatient. 2.Do not drink alcohol or smoke 24 hours prior to your procedure. 3. Bring a picture ID and your insurance card. 4. You will be contacted by the hospital the day before to confirm the procedure time and location. 5. Do not take any herbal supplements or blood thinners at least one week before your procedure/surgery. This includes NSAID's (aspirin, baby aspirin, Motrin, Ibuprofen, Aleve, Advil, Naproxen, etc), Plavix, fish oil, vitamin E, turmeric, CoQ10, or green tea supplements, etc. *TYLENOL or acetaminophen is ok to take*    6. PRE-OPERATIVE TESTING: History and physical with medical clearance is REQUIRED within 30 days of the surgery date and is mandatory per Dr. Ginna Carrasquillo. *If this is not done, your surgery will be postponed*  MEDICAL CLEARANCE WITH DR. Beauchamp  CBC  CMP  EKG    7. Please inform us if you start or change any medications at least one week before surgery (ex: blood thinners, weight loss medications, diabetic medications, herbal supplements, etc)    8. Does patient have diagnosis of sleep apnea?     [   ] Yes     [  X  ]  No    Consent obtained  Photos taken on 10/3/2023

## 2023-10-03 NOTE — PROGRESS NOTES
Phong Lizama is a 79year old female who presents today for a preoperative visit in anticipation of a left prophylactic mastectomy without immediate reconstruction and right chest wall closure with latissimus dorsi flap. She has not smoked. Physical Examination:  Breasts: The right chest wound shows an adherent contracted skin graft with a few superficial ulcerations measuring proxy 1 cm in greatest diameter and approximately 4 cm in greatest length. The right latissimus dorsi muscle is innervated. Assessment and Plan:  We discussed the plan for primary closure of the left breast wound and a right latissimus dorsi flap for chest wall closure. The nature of procedure was reviewed with the patient. We discussed the elevated risk of wound healing difficulties and seroma given the patient's weight. We discussed the risk of surgery including but not limited to bleeding, infection, scarring, delayed wound healing, partial or total flap loss, seroma, hypertrophic scar or keloid, injury to adjacent structures including pneumothorax, and need for further surgery. We also discussed medical risks including VTE, MI, and stroke. We discussed expected postoperative course including need for drains, activity limitation, and compression. Multiple questions were answered to the patient's satisfaction. No guarantees as to outcome were offered. The patient expresses understanding and wishes to proceed.

## 2023-10-10 ENCOUNTER — HOSPITAL ENCOUNTER (OUTPATIENT)
Dept: ULTRASOUND IMAGING | Age: 68
Discharge: HOME | End: 2023-10-10
Attending: INTERNAL MEDICINE
Payer: MEDICARE

## 2023-10-10 DIAGNOSIS — K74.60 CIRRHOSIS OF LIVER WITHOUT ASCITES, UNSPECIFIED HEPATIC CIRRHOSIS TYPE (HCC): ICD-10-CM

## 2023-10-10 PROCEDURE — 76705 ECHO EXAM OF ABDOMEN: CPT | Performed by: INTERNAL MEDICINE

## 2023-10-18 ENCOUNTER — TELEPHONE (OUTPATIENT)
Dept: INTERNAL MEDICINE CLINIC | Facility: CLINIC | Age: 68
End: 2023-10-18

## 2023-10-18 DIAGNOSIS — K74.60 CIRRHOSIS OF LIVER WITHOUT ASCITES, UNSPECIFIED HEPATIC CIRRHOSIS TYPE (HCC): Primary | ICD-10-CM

## 2023-10-18 NOTE — TELEPHONE ENCOUNTER
Patient needs a letter from Dr. Angelina Moore to return to work; please have the letter state the following:    \"Patient is released to return to work with no restrictions as of today (10/18/2023)\"    Please send letter through [address]     Any questions or concerns, please call patient at:    [phone]     KG

## 2023-10-19 ENCOUNTER — TELEPHONE (OUTPATIENT)
Facility: CLINIC | Age: 68
End: 2023-10-19

## 2023-10-19 NOTE — TELEPHONE ENCOUNTER
6 month ultrasound recall entered into patient outreach in [address] [place] [address] Rd. Next due April 2024. Left message for patient to call back.

## 2023-10-19 NOTE — TELEPHONE ENCOUNTER
----- Message from Oswaldo Solitario MD sent at 10/18/2023  3:23 PM CDT -----  Liver ultrasound shows no mass or lesion -- repeat in 6 months. Order placed for the ultrasound     You are over due for lab work - I have placed order and you can go and get this done.

## 2023-10-27 ENCOUNTER — DOCUMENTATION ONLY (OUTPATIENT)
Dept: SURGERY | Facility: CLINIC | Age: 68
End: 2023-10-27

## 2023-10-27 ENCOUNTER — TELEPHONE (OUTPATIENT)
Dept: SURGERY | Facility: CLINIC | Age: 68
End: 2023-10-27

## 2023-10-27 DIAGNOSIS — Z91.89 AT HIGH RISK FOR BREAST CANCER: Primary | ICD-10-CM

## 2023-10-27 DIAGNOSIS — Z85.3 HISTORY OF BREAST CANCER: ICD-10-CM

## 2023-10-27 NOTE — PATIENT INSTRUCTIONS
Dr. Estrella Chavarria  Tel: [phone]  Fax: [address]  [address] [place], [place] [address]  [phone]     Surgery/Procedure: Left breast simple mastectomy Elijah Fitzgerald) with right breast reconstruction Bee Jeff)     Anesthesia:   Gen  Surgery Length:   1 hour CPT:  51352   Wire LOC:   No   Nuc Med:   No   Shruti Seed:  No       Dx & ICD-10: At high risk for breast cancer (Z91.89), History of breast cancer (Z85.3). Radiology Instructions: N/A   _______________________________________________________________________________    Someone must accompany you the day of the procedure to drive you home safely, because of anesthesia. You will need an adult  to stay with you the first night following your surgery. You must remove any kind of makeup, acrylic nails, lotions, powders, creams or deodorant. [place] ONLY: Pre-admission will give instruct you on when to take Gatorade and Tylenol/acetaminophen prior to your surgery, purchase 2 - 12oz bottles of regular Gatorade (NOT RED/SUGAR FREE). Otherwise, you may not eat or drink anything else after 11PM the night before surgery. [place] ONLY: You may not eat or drink anything after midnight the day of your surgery. Wear comfortable clothing that can be easily removed. If you wear dentures, contacts lenses, or any prosthesis, you will be asked to remove them. Do not drink alcohol or smoke 24 hours prior to your procedure. Bring a picture ID and your insurance card. Covid-19 testing is no longer required before surgery unless you are experiencing symptoms such as fever, cough, congestion, etc.   The Pre-Admission Testing Department will call the day before to confirm your procedure, give you the time you need to arrive by and directions on where to go. They begin making calls after 2pm, if you are not contacted by 4pm, please call the surgeon's office listed above.   Do not take any blood thinners at least one week prior to the procedure/surgery. This includes aspirin, baby aspirin, Ibuprofen products, herbal supplements, diet medications, vitamin E, fish oil and green tea supplements. Please check other supplements for these ingredients. *TYLENOL or acetaminophen is acceptable*  If you take Coumadin, Plavix, Xarelto, or Eliquis, please contact your prescribing physician for special instructions on how long to hold. If you take insulin contact your primary care physician for special instructions. Our surgery scheduler, Arslan Guevara, will be contacting you to discuss surgery dates. If you have any questions related to scheduling your surgery, please reach out to her at [phone].  _____________________________________________________________________  PRE-OPERATIVE TESTING IF INDICATED BELOW  PLEASE COMPLETE ASAP (AT LEAST 14-21 DAYS PRIOR TO SURGERY)  [] CBC [] BMP [] CMP [] EKG    [] PT, PTT, INR [] Cardiac Clearance  [] H&P Medical Clearance [] Chest X-ray     Please call Central Scheduling to schedule an appointment for pre-operative labs/tests @ [phone]  Does the patient have a pacemaker or ICD? Does the patient have sleep apnea?   [] Yes   [x] No                               [] Yes   [x] No

## 2023-10-27 NOTE — TELEPHONE ENCOUNTER
Calling pt in regards to scheduling surgery. Informed pt that I have 05/06/2023 available at [facility] with Dr. Levar Diop. Pt verbalized understanding and in agreement with date and location. All questions answered. Encouraged pt to call or [facility] message office with any other questions or concerns.

## 2023-11-01 ENCOUNTER — OFFICE VISIT (OUTPATIENT)
Dept: ORTHOPEDICS CLINIC | Facility: CLINIC | Age: 68
End: 2023-11-01
Payer: MEDICARE

## 2023-11-01 DIAGNOSIS — S83.241D TEAR OF MEDIAL MENISCUS OF RIGHT KNEE, CURRENT, UNSPECIFIED TEAR TYPE, SUBSEQUENT ENCOUNTER: Primary | ICD-10-CM

## 2023-11-01 PROCEDURE — 99213 OFFICE O/P EST LOW 20 MIN: CPT | Performed by: ORTHOPAEDIC SURGERY

## 2023-11-01 NOTE — PROGRESS NOTES
NURSING INTAKE COMMENTS: Patient presents with:  Knee Pain: R knee arthroscopy s/p sx on 6/19/2023- rates pain 6-7/10 on and off- stated stiffness and swelling at times      HPI: This 79year old female presents today with complaints of right knee pain follow-up. She is now 3-1/2 months postoperative. Her preoperative symptoms have improved. She no longer has significant pain along the medial knee. She has some discomfort over the anterior knee diffusely noted. She reports some discomfort with weather changes. She works out on a treadmill with minimal problem. She finished her physical therapy. She takes no medications for the knee. Past Medical History:   Diagnosis Date    Biliary calculus 01/01/1978    Removed gallbladder    Breast cancer ([address]     Carotid artery stenosis     R , s/p radiation    Cirrhosis of liver ([address] 01/01/2013    Disorder of liver     Disorder of thyroid     History of blood transfusion 01/01/2005    Meniscus, medial, bucket handle tear, old     Obesity     Osteoarthritis     Other and unspecified hyperlipidemia     Personal history of antineoplastic chemotherapy     Presence of implantable pulmonary artery pressure and heart rate monitoring system     S/P TRAM (transverse rectus abdominis muscle) flap breast reconstruction     Stroke ([address] 2010 and 2014    TIA    Thyroid disease     TIA (transient ischemic attack)      Past Surgical History:   Procedure Laterality Date    APPENDECTOMY      BREAST RECONSTRUCTION  2005    R Side, Multiple Procedures     BREAST SURGERY      CHEMOTHERAPY      CHOLECYSTECTOMY  1978    MASTECTOMY RIGHT      OTHER      masectomy    RADIATION RIGHT      REMOVAL GALLBLADDER      STENT PLACEMT RETRO CAROTID      2011--right carotid     Current Outpatient Medications   Medication Sig Dispense Refill    atorvastatin 20 MG Oral Tab Take 1 tablet (20 mg total) by mouth nightly.  90 tablet 0    levothyroxine 125 MCG Oral Tab Take 1 tablet (125 mcg total) by mouth before breakfast. 90 tablet 0    aspirin 325 MG Oral Tab Take 1 tablet (325 mg total) by mouth daily. Penicillins             ANAPHYLAXIS    Comment:Pt also had blue dye at same time and is not sure             which caused her rxn. Sulfa Antibiotics       ANAPHYLAXIS    Comment:SOME TYPE OF BLUE DYE USED IN BIOPSY. Pt had pcn             at same time and is not sure which caused her rxn. Blue Dye                ANAPHYLAXIS    Comment:Patient reports not IV dye used during CT             angiogram/Cerebral angiograms - had both in the             past without reaction. Patient reports it was a             dye used in 2005 to trace the lymphatic system             during a mastectomy.   Family History   Problem Relation Age of Onset    Other (Other) Mother         Aneurysm 1974    Cancer Maternal Aunt         Brain    Breast Cancer Self 54       Social History    Occupational History      Occupation: Works at [employer] at [employer]      Smoking status: Light Smoker        Years: 30        Types: Cigarettes      Smokeless tobacco: Never    Vaping Use      Vaping Use: Never used    Substance and Sexual Activity      Alcohol use: No        Alcohol/week: 0.0 standard drinks of alcohol      Drug use: No      Sexual activity: Not on file       Review of Systems:  GENERAL: denies fevers, chills, night sweats, fatigue, unintentional weight loss/gain  SKIN: denies skin lesions, open sores, rash  HEENT:denies recent vision change, new nasal congestion,hearing loss, tinnitus, sore throat, headaches  RESPIRATORY: denies new shortness of breath, cough, asthma, wheezing  CARDIOVASCULAR: denies chest pain, leg cramps with exertion, palpitations, leg swelling  GI: denies abdominal pain, nausea, vomiting, diarrhea, constipation, hematochezia, worsening heartburn or stomach ulcers  : denies dysuria, hematuria, incontinence, increased frequency, urgency, difficulty urinating  MUSCULOSKELETAL: denies musculoskeletal complaints other than in HPI  NEURO: denies numbness, tingling, weakness, balance issues, dizziness, memory loss  PSYCHIATRIC: denies Hx of depression, anxiety, other psychiatric disorders  HEMATOLOGIC: denies blood clots, anemia, blood clotting disorders, blood transfusion  ENDOCRINE: denies autoimmune disease, thyroid issues, or diabetes  ALLERGY: denies asthma, seasonal allergies    Physical Examination:    [facility] 01/01/2005 (Exact Date)   Constitutional: appears well hydrated, alert and responsive, no acute distress noted  Extremities: Right knee trace effusion. No significant tenderness over the medial joint line. Mild patellofemoral crepitus noted. Ligament stability normal.  Range of motion 0 to 120 degrees. No calf tenderness or swelling. Neurological: Unchanged    Imaging:   US LIVER (CPT=76705)    Result Date: 10/10/2023  PROCEDURE: US LIVER (CPT=76705)  COMPARISON: [address] [place] [address] Dr Avalos, US LIVER (SEN=[id number]), 11/10/2022, 9:49 AM.  INDICATIONS: Cirrhosis of liver without ascites  TECHNIQUE:   The liver was evaluated with gray scale and colorflow of the main vessels. FINDINGS:  LIVER:   Generalized coarse increase in hepatic echogenicity with nodular hepatic surface contour. No mass. Normal size, and color flow. Color flow and Doppler imaging of portal and hepatic veins show patency and antegrade flow. Main portal vein velocity 17.8 centimeters/second. GALLBLADDER: Post cholecystectomy. BILE DUCTS: Mild dilatation of the extrahepatic biliary tree related to the post cholecystectomy state. Common bile duct measures 9.9 mm. LIVER: Visualized portions normal.  PANCREAS: Pancreatic head and proximal body normal. Remainder of pancreas obscured by bowel  gas. OTHER: Negative. CONCLUSION:  1. Hepatic cirrhosis. No evidence of hepatoma. Patent main portal vein with normal flow direction and velocity.      Dictated by (CST): Judd Hoff MD on 10/10/2023 at 11:54 AM Finalized by (CST): Sheryle Corin, MD on 10/10/2023 at 11:57 AM             Labs:  Lab Results   Component Value Date    WBC 4.8 05/01/2023    HGB 14.1 05/01/2023    .0 (L) 05/01/2023      Lab Results   Component Value Date    GLU 80 05/01/2023    BUN 18 05/01/2023    CREATSERUM 0.60 05/01/2023    GFR >60 01/13/2016    GFRNAA 97 04/12/2021    GFRAA 112 04/12/2021        Assessment and Plan:  Diagnoses and all orders for this visit:    Tear of medial meniscus of right knee, current, unspecified tear type, subsequent encounter        Assessment: Healing right knee after arthroscopic debridement and partial Bashir meniscectomy. Right knee osteoarthritis, primary    Plan: I advised continued strengthening exercises for the quadriceps. Continue icing and oral anti-inflammatories. I discussed activity modifications and home exercises. Expect gradual improvement in symptoms. If symptoms worsen over the coming months or years, would advise repeat x-rays to assess arthritis. Follow-up with me again as needed. Follow Up: Return if symptoms worsen or fail to improve.     Mary Lou Phelps MD

## 2023-11-02 ENCOUNTER — TELEPHONE (OUTPATIENT)
Dept: INTERNAL MEDICINE CLINIC | Facility: CLINIC | Age: 68
End: 2023-11-02

## 2023-11-14 DIAGNOSIS — E03.9 HYPOTHYROIDISM, UNSPECIFIED TYPE: ICD-10-CM

## 2023-11-14 RX ORDER — LEVOTHYROXINE SODIUM 0.12 MG/1
125 TABLET ORAL
Qty: 90 TABLET | Refills: 0 | Status: SHIPPED | OUTPATIENT
Start: 2023-11-14

## 2023-11-14 NOTE — TELEPHONE ENCOUNTER
Future Appointment:none      Last Appointment:9/25/23      Last Refill:8/14/23      Medication Requested:   Requested Prescriptions     Pending Prescriptions Disp Refills    LEVOTHYROXINE 125 MCG Oral Tab [Pharmacy Med Name: Levothyroxine Sodium 125 MCG Oral Tablet] 90 tablet 0     Sig: TAKE 1 TABLET BY MOUTH BEFORE BREAKFAST     Protocol :       Thyroid Supplements Protocol [id number]/14/2023 03:11 PM   Protocol Details TSH test in past 12 months    TSH value between 0.350 and 5.500 IU/ml    Appointment in past 12 or next 3 months

## 2023-12-20 ENCOUNTER — TELEPHONE (OUTPATIENT)
Facility: CLINIC | Age: 68
End: 2023-12-20

## 2023-12-20 NOTE — TELEPHONE ENCOUNTER
[facility]:   Hepatic Function Panel (7) (Order #[id number]) on 10/18/23     AFP, Tumor Marker, Serum (Order #[id number]) on 10/18/23

## 2024-01-05 ENCOUNTER — TELEPHONE (OUTPATIENT)
Dept: INTERNAL MEDICINE CLINIC | Facility: CLINIC | Age: 69
End: 2024-01-05

## 2024-01-05 ENCOUNTER — TELEPHONE (OUTPATIENT)
Dept: SURGERY | Facility: CLINIC | Age: 69
End: 2024-01-05

## 2024-01-05 NOTE — TELEPHONE ENCOUNTER
Pt called and will be having surgery and needs a presurgical clearance.  She will be having a Lt breast mastectomy and rt flap reconstruction.  It will be done at [facility]

## 2024-01-16 ENCOUNTER — TELEPHONE (OUTPATIENT)
Dept: SURGERY | Facility: CLINIC | Age: 69
End: 2024-01-16

## 2024-01-16 DIAGNOSIS — Z91.89 AT HIGH RISK FOR BREAST CANCER: Primary | ICD-10-CM

## 2024-01-16 NOTE — TELEPHONE ENCOUNTER
Patient called and informed me that she has gone for a second opinion and will not be having surgery here

## 2024-01-21 NOTE — PROGRESS NOTES
HPI:     Estelle Dubose is a 68 year old female who presents for a pre-operative physical exam. Patient is to have   Breast  surgery on 2/21/24  ( left skin sparing mastecomty and left chest port removal-) Dr Cl Gudino   Pt has had previous anesthesia:  Yes.  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 Alicia for cardiology  Had nl echo and angiogram 5/5/23   No hx sleep apnea   history includes:  -TIA on asa   Breast cancer now with lesion at skin graft site   Carotid stenosis   Liver cirrhosis from radiation    Has gained some weight back and is smoking 7-8 cigarettes a day but trying hard to quit  Doesn't want medication- has quit cold turkey before and going to do it again     Only current concern is dental issue  Needs teeth removed and dentures  Saw dentist last month  Doing this after the breast surgery  Having some sinus vs dental pain now- left tooth, left ear, maxillary sinus  Minimal drainage   No cough  No fevers  ? About PCN allergy     Current Outpatient Medications   Medication Sig Dispense Refill    cefdinir 300 MG Oral Cap Take 1 capsule (300 mg total) by mouth 2 (two) times daily for 7 days. 14 capsule 0    levothyroxine 125 MCG Oral Tab Take 1 tablet (125 mcg total) by mouth before breakfast. 90 tablet 0    atorvastatin 20 MG Oral Tab Take 1 tablet (20 mg total) by mouth nightly. 90 tablet 0    aspirin 325 MG Oral Tab Take 1 tablet (325 mg total) by mouth daily.        Allergies:   Allergies   Allergen Reactions    Penicillins ANAPHYLAXIS     Pt also had blue dye at same time and is not sure which caused her rxn.     Sulfa Antibiotics ANAPHYLAXIS     SOME TYPE OF BLUE DYE USED IN BIOPSY. Pt had pcn at same time and is not sure which caused her rxn.     Blue Dye  ANAPHYLAXIS     Patient reports not IV dye used during CT angiogram/Cerebral angiograms - had both in the past without reaction.  Patient reports it was a dye used in 2005 to trace the lymphatic system during a mastectomy.      Past Medical History:   Diagnosis Date    Biliary calculus 01/01/1978    Removed gallbladder    Breast cancer ([facility])     Carotid artery stenosis     R , s/p radiation    Cirrhosis of liver ([facility]) 01/01/2013    Disorder of liver     Disorder of thyroid     History of blood transfusion 01/01/2005    Meniscus, medial, bucket handle tear, old     Obesity     Osteoarthritis     Other and unspecified hyperlipidemia     Personal history of antineoplastic chemotherapy     Presence of implantable pulmonary artery pressure and heart rate monitoring system     S/P TRAM (transverse rectus abdominis muscle) flap breast reconstruction     Stroke ([facility]) 2010 and 2014    TIA    Thyroid disease     TIA (transient ischemic attack)       Past Surgical History:   Procedure Laterality Date    APPENDECTOMY      BREAST RECONSTRUCTION  2005    R Side, Multiple Procedures     BREAST SURGERY      CHEMOTHERAPY      CHOLECYSTECTOMY  1978    MASTECTOMY RIGHT      OTHER      masectomy    RADIATION RIGHT      REMOVAL GALLBLADDER      STENT PLACEMT RETRO CAROTID      2011--right carotid      Family History   Problem Relation Age of Onset    Other (Other) Mother         Aneurysm 1974    Cancer Maternal Aunt         Brain    Breast Cancer Self 49      Social History:      REVIEW OF SYSTEMS:   GENERAL: feels well otherwise, no fevers   HENT: denies sore throat  LUNGS: denies shortness of breath with exertion  CARDIOVASCULAR: denies chest pain on exertion  GI: denies abdominal pain  : denies dysuria      EXAM:   /60   Pulse 83   Ht 4' 11\" (1.499 m)   Wt 194 lb (88 kg)   LMP 01/01/2005 (Exact Date)   SpO2 98%   BMI 39.18 kg/m²   GENERAL: well developed, well nourished,in no apparent distress  HEENT: atraumatic,  normocephalic, O/P clear  EYES: nl conjunctiva   NECK: supple,no adenopathy  Ears and throat normal  Some maxillary sinus tenderness  Mouth: cracked tooth left upper back- no signs of abscess/ swollen gums/ no drainage. Cheek nontender   LUNGS: clear to auscultation  CARDIO: RRR without murmur  GI: good BS's,no masses, HSM or tenderness  R breast: lesion at mastectomy site, no signs of infection  EXTREMITIES: no edema  NEURO: Alert, no focal deficits    Carotid US 5/23 showed no significant stenosis.     ASSESSMENT AND PLAN:   Estelle Dubose is a 68 year old female who presents for a pre-operative physical exam.       1. Pre-op exam  Pt has hx of TIA on asa, hyperlipidemia, carotid stenosis and smoking hx. METs> 4. No cardiac complaints or history of sleep apnea  Patient had normal angiogram last year 5/23 . She is due to follow up with her cardiologist- she will set up an appt.  Patient to get labs and EKG done  If EKG and labs normal,  given normal angiogram last year, she may proceed with surgery without further intervention.  Recommend complete smoking cessation.   - EKG 12 Lead to be performed at [facility]; Future  - CBC W Differential W Platelet [E]; Future  - Comp Metabolic Panel (14) [E]; Future  - Cardio Referral - Internal  - MRSA Screen by PCR; Future    2. Tobacco use  Recommend complete cessation prior to therapy- pt is motivated   Declined meds    3. Breast lesion  Planning for surgery     4. Pain, dental  Dental pain vs sinus infection- will treat with antibiotic as precaution but f/u with dentist asap if worsening pain or signs of dental infection    - cefdinir 300 MG Oral Cap; Take 1 capsule (300 mg total) by mouth 2 (two) times daily for 7 days.  Dispense: 14 capsule; Refill: 0      Spent over 30  minutes obtaining history, reviewing chart and labs, evaluating patient, discussing treatment options, educating patient on disease progress and completing documentation.

## 2024-01-22 ENCOUNTER — OFFICE VISIT (OUTPATIENT)
Dept: INTERNAL MEDICINE CLINIC | Facility: CLINIC | Age: 69
End: 2024-01-22
Payer: MEDICARE

## 2024-01-22 VITALS
OXYGEN SATURATION: 98 % | BODY MASS INDEX: 39.11 KG/M2 | WEIGHT: 194 LBS | SYSTOLIC BLOOD PRESSURE: 130 MMHG | HEIGHT: 59 IN | DIASTOLIC BLOOD PRESSURE: 60 MMHG | HEART RATE: 83 BPM

## 2024-01-22 DIAGNOSIS — Z72.0 TOBACCO USE: ICD-10-CM

## 2024-01-22 DIAGNOSIS — K08.89 PAIN, DENTAL: ICD-10-CM

## 2024-01-22 DIAGNOSIS — N64.9 BREAST LESION: ICD-10-CM

## 2024-01-22 DIAGNOSIS — Z01.818 PRE-OP EXAM: Primary | ICD-10-CM

## 2024-01-22 RX ORDER — CEFDINIR 300 MG/1
300 CAPSULE ORAL 2 TIMES DAILY
Qty: 14 CAPSULE | Refills: 0 | Status: SHIPPED | OUTPATIENT
Start: 2024-01-22 | End: 2024-01-29

## 2024-01-23 ENCOUNTER — TELEPHONE (OUTPATIENT)
Dept: INTERNAL MEDICINE CLINIC | Facility: CLINIC | Age: 69
End: 2024-01-23

## 2024-01-24 ENCOUNTER — EKG ENCOUNTER (OUTPATIENT)
Dept: LAB | Age: 69
End: 2024-01-24
Attending: FAMILY MEDICINE
Payer: MEDICARE

## 2024-01-24 ENCOUNTER — LAB ENCOUNTER (OUTPATIENT)
Dept: LAB | Age: 69
End: 2024-01-24
Attending: FAMILY MEDICINE
Payer: MEDICARE

## 2024-01-24 DIAGNOSIS — Z01.818 PRE-OP EXAM: ICD-10-CM

## 2024-01-24 DIAGNOSIS — K74.60 CIRRHOSIS OF LIVER WITHOUT ASCITES, UNSPECIFIED HEPATIC CIRRHOSIS TYPE (HCC): ICD-10-CM

## 2024-01-24 LAB
AFP-TM SERPL-MCNC: 3.3 NG/ML
ALBUMIN SERPL-MCNC: 4.5 G/DL (ref 3.2–4.8)
ALBUMIN/GLOB SERPL: 1.5 {RATIO} (ref 1–2)
ALP LIVER SERPL-CCNC: 188 U/L
ALT SERPL-CCNC: 36 U/L
ANION GAP SERPL CALC-SCNC: 10 MMOL/L (ref 0–18)
AST SERPL-CCNC: 55 U/L (ref ?–34)
BASOPHILS # BLD AUTO: 0.07 X10(3) UL (ref 0–0.2)
BASOPHILS NFR BLD AUTO: 1.4 %
BILIRUB DIRECT SERPL-MCNC: 0.2 MG/DL (ref ?–0.3)
BILIRUB SERPL-MCNC: 0.7 MG/DL (ref 0.2–1.1)
BUN BLD-MCNC: 18 MG/DL (ref 9–23)
BUN/CREAT SERPL: 23.7 (ref 10–20)
CALCIUM BLD-MCNC: 9.4 MG/DL (ref 8.7–10.4)
CHLORIDE SERPL-SCNC: 109 MMOL/L (ref 98–112)
CO2 SERPL-SCNC: 22 MMOL/L (ref 21–32)
CREAT BLD-MCNC: 0.76 MG/DL
DEPRECATED RDW RBC AUTO: 42.5 FL (ref 35.1–46.3)
EGFRCR SERPLBLD CKD-EPI 2021: 86 ML/MIN/1.73M2 (ref 60–?)
EOSINOPHIL # BLD AUTO: 0.32 X10(3) UL (ref 0–0.7)
EOSINOPHIL NFR BLD AUTO: 6.4 %
ERYTHROCYTE [DISTWIDTH] IN BLOOD BY AUTOMATED COUNT: 13 % (ref 11–15)
FASTING STATUS PATIENT QL REPORTED: YES
GLOBULIN PLAS-MCNC: 3.1 G/DL (ref 2.8–4.4)
GLUCOSE BLD-MCNC: 90 MG/DL (ref 70–99)
HCT VFR BLD AUTO: 40.7 %
HGB BLD-MCNC: 13.7 G/DL
IMM GRANULOCYTES # BLD AUTO: 0.01 X10(3) UL (ref 0–1)
IMM GRANULOCYTES NFR BLD: 0.2 %
LYMPHOCYTES # BLD AUTO: 0.7 X10(3) UL (ref 1–4)
LYMPHOCYTES NFR BLD AUTO: 13.9 %
MCH RBC QN AUTO: 30 PG (ref 26–34)
MCHC RBC AUTO-ENTMCNC: 33.7 G/DL (ref 31–37)
MCV RBC AUTO: 89.3 FL
MONOCYTES # BLD AUTO: 0.43 X10(3) UL (ref 0.1–1)
MONOCYTES NFR BLD AUTO: 8.6 %
NEUTROPHILS # BLD AUTO: 3.49 X10 (3) UL (ref 1.5–7.7)
NEUTROPHILS # BLD AUTO: 3.49 X10(3) UL (ref 1.5–7.7)
NEUTROPHILS NFR BLD AUTO: 69.5 %
OSMOLALITY SERPL CALC.SUM OF ELEC: 293 MOSM/KG (ref 275–295)
PLATELET # BLD AUTO: 107 10(3)UL (ref 150–450)
POTASSIUM SERPL-SCNC: 4.1 MMOL/L (ref 3.5–5.1)
PROT SERPL-MCNC: 7.6 G/DL (ref 5.7–8.2)
RBC # BLD AUTO: 4.56 X10(6)UL
SODIUM SERPL-SCNC: 141 MMOL/L (ref 136–145)
WBC # BLD AUTO: 5 X10(3) UL (ref 4–11)

## 2024-01-24 PROCEDURE — 36415 COLL VENOUS BLD VENIPUNCTURE: CPT

## 2024-01-24 PROCEDURE — 87641 MR-STAPH DNA AMP PROBE: CPT

## 2024-01-24 PROCEDURE — 82248 BILIRUBIN DIRECT: CPT

## 2024-01-24 PROCEDURE — 93005 ELECTROCARDIOGRAM TRACING: CPT

## 2024-01-24 PROCEDURE — 85025 COMPLETE CBC W/AUTO DIFF WBC: CPT

## 2024-01-24 PROCEDURE — 80053 COMPREHEN METABOLIC PANEL: CPT

## 2024-01-24 PROCEDURE — 82105 ALPHA-FETOPROTEIN SERUM: CPT

## 2024-01-24 PROCEDURE — 93010 ELECTROCARDIOGRAM REPORT: CPT | Performed by: INTERNAL MEDICINE

## 2024-01-25 LAB
ATRIAL RATE: 83 BPM
MRSA DNA SPEC QL NAA+PROBE: NEGATIVE
P AXIS: 67 DEGREES
P-R INTERVAL: 136 MS
Q-T INTERVAL: 386 MS
QRS DURATION: 80 MS
QTC CALCULATION (BEZET): 453 MS
R AXIS: 58 DEGREES
T AXIS: 47 DEGREES
VENTRICULAR RATE: 83 BPM

## 2024-01-31 ENCOUNTER — TELEPHONE (OUTPATIENT)
Facility: CLINIC | Age: 69
End: 2024-01-31

## 2024-01-31 NOTE — TELEPHONE ENCOUNTER
----- Message from Abel Sewell MD sent at 1/29/2024 12:07 PM CST -----  Blood work stable, low platelet count still present and likely related to the cirrhosis     The AFP was normal as well ( screening for liver cancer)     Repeat 6 months

## 2024-01-31 NOTE — TELEPHONE ENCOUNTER
6 month lab recall entered into patient outreach in [facility].    Next labs due around 7/24/2024.    Patient viewed below result note in MyChart:  Seen by patient Estelle Dubose on 1/29/2024  9:50 PM

## 2024-02-13 NOTE — PROGRESS NOTES
[facility] Consultation Note      Patient Name: Estelle Dubose   YOB: 1955  Medical Record Number: [id number]  Consulting Physician: Cristian Briseno M.D.   Referring Physician: Melanie Beauchamp MD     The 21st Century Cures Act makes medical notes like these available to patients in the interest of transparency. Please be advised this is a medical document. Medical documents are intended to carry relevant information, facts as evident, and the clinical opinion of the practitioner. The medical note is intended as peer to peer communication and may appear blunt or direct. It is written in medical language and may contain abbreviations or verbiage that are unfamiliar.      Date of Consultation: 2/16/2024      Reason for Consultation (Chief Complaint)  Thrombocytopenia.     History of Present Illness  Estelle Dubose is a 68 year old female who is referred for thrombocytopenia. Laboratory studies on 01/24/2024 showed a platelet count of 107 K/mcl and otherwise essentially unremarkable CBC. A review of laboratory studies in the electronic medical shows a mild thrombocytopenia dating back to at least 06/2014. The next earlier complete blood count for my review is from 06/2012 and showed a normal platelet count.     It was in 2014 that the patient was diagnosed with cirrhosis. An EGD on 01/28/2016 showed a single varix and portal hypertensive gastropathy. CT abdomen in 01/2016 showed a normal spleen size and multiple imaging studies since then have showed the spleen to not be enlarged.     Patient has a history of T2N3M0 right sided breast cancer diagnosed in 2005. She was treated with modified radical mastectomy with flap reconstruction, adjuvant chemotherapy with AC->T, adjuvant radiation therapy, and adjuvant endocrine therapy with tamoxifen from 2006 to 2012.    Patient is scheduled for elective left mastectomy and reconstruction and revision of her right flap reconstruction.     She  denies excessive or unexplained bleeding.     Past Medical History   Past Medical History:   Diagnosis Date    Biliary calculus 01/01/1978    Removed gallbladder    Breast cancer (HCC)     Carotid artery stenosis     R , s/p radiation    Cirrhosis of liver (HCC) 01/01/2013    Disorder of liver     Disorder of thyroid     History of blood transfusion 01/01/2005    Meniscus, medial, bucket handle tear, old     Obesity     Osteoarthritis     Other and unspecified hyperlipidemia     Personal history of antineoplastic chemotherapy     Presence of implantable pulmonary artery pressure and heart rate monitoring system     S/P TRAM (transverse rectus abdominis muscle) flap breast reconstruction     Stroke (HCC) 2010 and 2014    TIA    Thyroid disease     TIA (transient ischemic attack)      Past Surgical History   Past Surgical History:   Procedure Laterality Date    APPENDECTOMY      BREAST RECONSTRUCTION  2005    R Side, Multiple Procedures     BREAST SURGERY      CHEMOTHERAPY      CHOLECYSTECTOMY  1978    MASTECTOMY RIGHT      OTHER      masectomy    RADIATION RIGHT      REMOVAL GALLBLADDER      STENT PLACEMT RETRO CAROTID      2011--right carotid     Family History   Family History   Problem Relation Age of Onset    Other (Other) Mother         Aneurysm 1974    Cancer Maternal Aunt         Brain    Breast Cancer Self 49     Social History   Social History     Socioeconomic History    Marital status:    Occupational History    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 90  tablet 0     Allergies   Ms. Dubose is allergic to penicillins, sulfa antibiotics, and blue dye.    Vital Signs   /54 (BP Location: Left arm, Patient Position: Sitting, Cuff Size: large)   Pulse 89   Temp 98 °F (36.7 °C) (Oral)   Resp 16   Ht 1.499 m (4' 11\")   Wt 85.1 kg (187 lb 9.6 oz)   LMP 01/01/2005 (Exact Date)   SpO2 99%   BMI 37.89 kg/m²     Physical Examination   Constitutional      Well developed, well nourished. Appears close to chronological age. No apparent distress.   Head   Normocephalic and atraumatic.  Eyes   Conjunctiva clear; sclera anicteric.  ENMT                 External nose normal; external ears normal.  Neck                   Supple, without masses.  Hematologic/Lymphatic No cervical, supraclavicular, axillary lymphadenopathy.  Respiratory          Normal effort; no respiratory distress; lungs clear to auscultation bilaterally.  Cardiovascular     Regular rate and rhythm.  Abdomen            Non-tender; non-distended; no masses; no fluid wave; no hepatosplenomegaly.  Extremities          No lower extremity edema.  Neurologic           Motor and sensory grossly intact.  Psychiatric          Mood and affect appropriate.    Laboratory   Recent Results (from the past 672 hour(s))   AFP, Tumor Marker, Serum    Collection Time: 01/24/24  2:38 PM   Result Value Ref Range    AFP Tumor Marker 3.3 <8.0 ng/mL   Comp Metabolic Panel (14) [E]    Collection Time: 01/24/24  2:38 PM   Result Value Ref Range    Glucose 90 70 - 99 mg/dL    Sodium 141 136 - 145 mmol/L    Potassium 4.1 3.5 - 5.1 mmol/L    Chloride 109 98 - 112 mmol/L    CO2 22.0 21.0 - 32.0 mmol/L    Anion Gap 10 0 - 18 mmol/L    BUN 18 9 - 23 mg/dL    Creatinine 0.76 0.55 - 1.02 mg/dL    BUN/CREA Ratio 23.7 (H) 10.0 - 20.0    Calcium, Total 9.4 8.7 - 10.4 mg/dL    Calculated Osmolality 293 275 - 295 mOsm/kg    eGFR-Cr 86 >=60 mL/min/1.73m2    ALT 36 10 - 49 U/L    AST 55 (H) <=34 U/L    Alkaline Phosphatase 188 (H) 55 - 142 U/L     Bilirubin, Total 0.7 0.2 - 1.1 mg/dL    Total Protein 7.6 5.7 - 8.2 g/dL    Albumin 4.5 3.2 - 4.8 g/dL    Globulin  3.1 2.8 - 4.4 g/dL    A/G Ratio 1.5 1.0 - 2.0    Patient Fasting for CMP? Yes    MRSA Screen by PCR    Collection Time: 01/24/24  2:38 PM   Result Value Ref Range    MRSA Screen By PCR Negative Negative   CBC W/ DIFFERENTIAL    Collection Time: 01/24/24  2:38 PM   Result Value Ref Range    WBC 5.0 4.0 - 11.0 x10(3) uL    RBC 4.56 3.80 - 5.30 x10(6)uL    HGB 13.7 12.0 - 16.0 g/dL    HCT 40.7 35.0 - 48.0 %    MCV 89.3 80.0 - 100.0 fL    MCH 30.0 26.0 - 34.0 pg    MCHC 33.7 31.0 - 37.0 g/dL    RDW-SD 42.5 35.1 - 46.3 fL    RDW 13.0 11.0 - 15.0 %    .0 (L) 150.0 - 450.0 10(3)uL    Neutrophil Absolute Prelim 3.49 1.50 - 7.70 x10 (3) uL    Neutrophil Absolute 3.49 1.50 - 7.70 x10(3) uL    Lymphocyte Absolute 0.70 (L) 1.00 - 4.00 x10(3) uL    Monocyte Absolute 0.43 0.10 - 1.00 x10(3) uL    Eosinophil Absolute 0.32 0.00 - 0.70 x10(3) uL    Basophil Absolute 0.07 0.00 - 0.20 x10(3) uL    Immature Granulocyte Absolute 0.01 0.00 - 1.00 x10(3) uL    Neutrophil % 69.5 %    Lymphocyte % 13.9 %    Monocyte % 8.6 %    Eosinophil % 6.4 %    Basophil % 1.4 %    Immature Granulocyte % 0.2 %   Bilirubin, Direct    Collection Time: 01/24/24  2:38 PM   Result Value Ref Range    Bilirubin, Direct 0.2 <=0.3 mg/dL   EKG 12 Lead to be performed at [facility]    Collection Time: 01/24/24  2:45 PM   Result Value Ref Range    Ventricular rate 83 BPM    Atrial rate 83 BPM    P-R Interval 136 ms    QRS Duration 80 ms    Q-T Interval 386 ms    QTC Calculation (Bezet) 453 ms    P Axis 67 degrees    R Axis 58 degrees    T Axis 47 degrees     Impression and Plan   1.   Thrombocytopenia: Chronic and unchanging in nature. May be related to cirrhosis. While there is no evidence of splenomegaly and splenic sequestration, TPO is produced in the liver and in the setting a decrease in production contributes to the  thrombocytopenia seen in cirrhosis. Idiopathic thrombocytopenic purpura is also in the differential.           Regardless of etiology, the thrombocytopenia is mild and there is no hematologic contraindication to the planned left mastectomy and bilateral reconstruction.    2.   History of breast cancer: Patient is now more than 10 years out from diagnosis. Routine follow up with oncology is not required. We reviewed that for hormone positive breast cancer there remains a small risk of recurrence every 20 years after diagnosis.     Planned Follow Up   Patient may return to see me PRN.    Encounter Time  Pre-charting/reviewing medical records: 15 minutes.  In room with patient obtaining history, performing exam, counseling on diagnosis, and reviewing plan: 30 minutes.  Orders/notes: 15 minutes.  Total time: 60 minutes.    Electronically signed by:    Cristian Briseno M.D.  Systemic Medical Director of Oncology Services  [facility]

## 2024-02-16 ENCOUNTER — OFFICE VISIT (OUTPATIENT)
Dept: HEMATOLOGY/ONCOLOGY | Facility: HOSPITAL | Age: 69
End: 2024-02-16
Attending: FAMILY MEDICINE
Payer: MEDICARE

## 2024-02-16 VITALS
WEIGHT: 187.63 LBS | RESPIRATION RATE: 16 BRPM | DIASTOLIC BLOOD PRESSURE: 54 MMHG | OXYGEN SATURATION: 99 % | HEART RATE: 89 BPM | BODY MASS INDEX: 37.83 KG/M2 | TEMPERATURE: 98 F | SYSTOLIC BLOOD PRESSURE: 135 MMHG | HEIGHT: 59 IN

## 2024-02-16 DIAGNOSIS — D69.6 THROMBOCYTOPENIA (HCC): ICD-10-CM

## 2024-02-16 DIAGNOSIS — Z85.3 HISTORY OF BREAST CANCER: Primary | ICD-10-CM

## 2024-02-16 PROCEDURE — 99205 OFFICE O/P NEW HI 60 MIN: CPT | Performed by: SPECIALIST

## 2024-02-18 DIAGNOSIS — E03.9 HYPOTHYROIDISM, UNSPECIFIED TYPE: ICD-10-CM

## 2024-02-19 NOTE — TELEPHONE ENCOUNTER
A refill request was received for:  Requested Prescriptions     Pending Prescriptions Disp Refills    LEVOTHYROXINE 125 MCG Oral Tab [Pharmacy Med Name: Levothyroxine Sodium 125 MCG Oral Tablet] 90 tablet 0     Sig: TAKE 1 TABLET BY MOUTH ONCE DAILY BEFORE BREAKFAST     Last refill date:  11/14/23    Last office visit: 1/22/24      No future appointments.    
back pain/injury

## 2024-02-20 RX ORDER — LEVOTHYROXINE SODIUM 0.12 MG/1
125 TABLET ORAL
Qty: 90 TABLET | Refills: 1 | Status: SHIPPED | OUTPATIENT
Start: 2024-02-20

## 2024-03-27 ENCOUNTER — TELEPHONE (OUTPATIENT)
Facility: CLINIC | Age: 69
End: 2024-03-27

## 2024-03-27 NOTE — TELEPHONE ENCOUNTER
Patient outreach message received:    6 month ultrasound recall entered into patient outreach in [facility]. Next due April 2024.

## 2024-03-27 NOTE — TELEPHONE ENCOUNTER
Left message to call back.  CSS:  Please transfer to RN if patient calls back.  Thank you.    She is due for recall Ultrasound and order already in system.

## 2024-03-28 NOTE — TELEPHONE ENCOUNTER
I have placed a quant lab order.  Patient can have this done to confirm the pregnancy.  I will let her know the results when I get them.  It is patient's decision regarding the COVID vaccine.  The information sent to patient is good and she can base her decision on that information.      Dot Matamoros,      Patient contacted.  Aware she is due for ultrasound of the liver and that there is an order already in place.  She told me that she already has the number to central scheduling and she will call to set this up.

## 2024-04-30 ENCOUNTER — TELEPHONE (OUTPATIENT)
Facility: CLINIC | Age: 69
End: 2024-04-30

## 2024-04-30 NOTE — TELEPHONE ENCOUNTER
Called patient regarding setting up an appointment with Dr. Najjar (Dr. Herve Solis contacted Dr. Najjar regarding patient). Called on Friday (04/26/2024), no answer. Left VM with call back number. Called today, no answer. Left second VM.

## 2024-05-02 ENCOUNTER — OFFICE VISIT (OUTPATIENT)
Facility: CLINIC | Age: 69
End: 2024-05-02
Payer: MEDICARE

## 2024-05-02 DIAGNOSIS — I82.B12 SUBCLAVIAN VEIN OCCLUSION, LEFT (HCC): ICD-10-CM

## 2024-05-02 DIAGNOSIS — I65.29 STENOSIS OF CAROTID ARTERY, UNSPECIFIED LATERALITY: Primary | ICD-10-CM

## 2024-05-02 DIAGNOSIS — S25.302S: ICD-10-CM

## 2024-05-02 RX ORDER — ATORVASTATIN CALCIUM 20 MG/1
20 TABLET, FILM COATED ORAL NIGHTLY
Qty: 90 TABLET | Refills: 0 | Status: SHIPPED | OUTPATIENT
Start: 2024-05-02

## 2024-05-03 NOTE — PROGRESS NOTES
Samer F. Najjar, MD  Vascular Surgery  [facility]      VASCULAR SURGERY   CLINIC CONSULT NOTE        Name: Estelle Dubose   :   1955  [id number]     REFERRING PHYSICIAN:  No ref. provider found  PRIMARY CARE PHYSICIAN:  Melanie Beauchamp MD    HISTORY OF PRESENT ILLNESS:   Patient is a 68 year old female with a history of breast cancer who has been referred regarding establishing vascular surgery care. She has recently underwent  a complex plastic surgery procedure at [facility] in 2024. She underwent R chest wall reconstruction w/ R latissimus myocutaneous flap, closure of L chest mastectomy defect with local skin flaps, L skin sparing mastectomy, excision of R chest skin graft, and L chest portacath removal on .  Vascular surgery was consulted for intra-op for significant venous bleeding following L chest port removal. She underwent Left subclavian vein stent placement on 2024. On aspirin and Eliquis bid now. She has a history of a right carotid stent that was place many years prior.  She lives closer to [place]. She underwent a venous duplex over there that revealed wide patency of the left internal jugular, subclavian, axillary, brachial, radial, ulnar, basilic and cephalic veins. The veins are easily compressible and appear normal. There is normal spontaneous and phasic flow.  However, the proximal subclavian vein cannot be completely examined by a duplex scan. Therefore, Dr Solis recommended a LUE venogram for possible stent stenosis. The patient denies any arm edema or prominent veins along her chest.  She is still healing her chest wall reconstruction.      PAST MEDICAL HISTORY:    Past Medical History:    Biliary calculus    Removed gallbladder    Breast cancer (HCC)    Carotid artery stenosis    R , s/p radiation    Cirrhosis of liver (HCC)    Disorder of liver    Disorder of thyroid    History of blood transfusion    Meniscus, medial, bucket handle tear, old     Obesity    Osteoarthritis    Other and unspecified hyperlipidemia    Personal history of antineoplastic chemotherapy    Presence of implantable pulmonary artery pressure and heart rate monitoring system    S/P TRAM (transverse rectus abdominis muscle) flap breast reconstruction    Stroke (HCC)    TIA    Thyroid disease    TIA (transient ischemic attack)       PAST SURGICAL HISTORY:   Past Surgical History:   Procedure Laterality Date    Appendectomy      Breast reconstruction  2005    R Side, Multiple Procedures     Breast surgery      Chemotherapy      Cholecystectomy  1978    Mastectomy right      Other      masectomy    Radiation right      Removal gallbladder      Stent placemt retro carotid      2011--right carotid        MEDICATIONS:     Current Outpatient Medications:     atorvastatin 20 MG Oral Tab, Take 1 tablet (20 mg total) by mouth nightly., Disp: 90 tablet, Rfl: 0    levothyroxine 125 MCG Oral Tab, Take 1 tablet (125 mcg total) by mouth before breakfast., Disp: 90 tablet, Rfl: 1    aspirin 325 MG Oral Tab, Take 1 tablet (325 mg total) by mouth daily., Disp: , Rfl:     ALLERGIES:    She is allergic to penicillins, sulfa antibiotics, and blue dye.    SOCIAL HISTORY:    Patient  reports that she has been smoking cigarettes. She has never used smokeless tobacco. She reports that she does not drink alcohol and does not use drugs.    FAMILY HISTORY:    Patient's family history includes Breast Cancer (age of onset: 47) in her self; Cancer in her maternal aunt; Other in her mother.    ROS:     A 12 point review of systems with pertinent positives and negatives listed in the HPI.    EXAM:    LMP 01/01/2005 (Exact Date)   GENERAL: alert and orientated X 3, well developed, well nourished, in no apparent distress  PSYCH: normal mood and affect  HEENT: ears and throat are clear  NECK: supple, no lymphadenopathy, thyroid wnl  CAROTID: no bruit   RESPIRATORY: no rales, rhonchi, or wheezes B  CARDIO: RRR without  murmur, no murmur, no gallop   ABDOMEN: soft, non-tender with no palpable aneurysm or masses  BACK: normal, no tenderness  SKIN: no rashes, warm and dry  EXTREMITIES: no tenderness  NEURO: no sensory or motor deficits  VASCULAR:      Femoral Popliteal DP PT Peroneal   Right 2+       palpable, strong palpable, strong    Left 2+       palpable, strong palpable, strong        LABS:   Lab Results   Component Value Date     08/15/2019    A1C 5.2 08/15/2019      Lab Results   Component Value Date    GLU 90 01/24/2024    BUN 18 01/24/2024    CREATSERUM 0.76 01/24/2024    BUNCREA 23.7 (H) 01/24/2024    ANIONGAP 10 01/24/2024    GFRAA 112 04/12/2021    GFRNAA 97 04/12/2021    CA 9.4 01/24/2024     01/24/2024    K 4.1 01/24/2024     01/24/2024    CO2 22.0 01/24/2024    OSMOCALC 293 01/24/2024      Lab Results   Component Value Date    WBC 5.0 01/24/2024    RBC 4.56 01/24/2024    HGB 13.7 01/24/2024    HCT 40.7 01/24/2024    MCV 89.3 01/24/2024    MCH 30.0 01/24/2024    MCHC 33.7 01/24/2024    RDW 13.0 01/24/2024    .0 (L) 01/24/2024    MPV 10.5 (H) 10/31/2018        Lab Results   Component Value Date    HGB 13.7 01/24/2024    HGB 14.1 05/01/2023    HGB 14.0 04/03/2023    HGB 14.7 10/06/2022    HGB 15.4 04/12/2021    HGB 16.0 08/31/2020    CREATSERUM 0.76 01/24/2024    CREATSERUM 0.60 05/01/2023    CREATSERUM 0.67 04/03/2023    CREATSERUM 0.65 11/21/2022    CREATSERUM 0.72 10/06/2022    CREATSERUM 0.60 04/12/2021       IMAGING:       ASSESSMENT  Diagnoses and all orders for this visit:    Stenosis of carotid artery, unspecified laterality  -     atorvastatin 20 MG Oral Tab; Take 1 tablet (20 mg total) by mouth nightly.  -     US CAROTID DOPPLER BILAT - DIAG IMG (CPT=93880); Future    Subclavian vein injury, left, sequela  -     atorvastatin 20 MG Oral Tab; Take 1 tablet (20 mg total) by mouth nightly.  -     US VENOUS DOPPLER ARM LEFT - DIAG IMG (CPT=93971); Future    Subclavian vein occlusion, left  ([facility])  -     US VENOUS DOPPLER ARM LEFT - DIAG IMG (CPT=93971); Future    The patient has the left subclavian vein stent that was placed in an emergent fashion because of a vein injury during a port removal.  She has been stable with no arm edema and no clinical evidence of the stent thrombosis or stenosis.  Her venous ultrasound at [place] was unable to reveal whether the stent is patent but the flow in her venous system was normal which is indirectly suggestive of patency of her stent.  She has not had any recent carotid duplex for her right carotid artery stent.  Given lack of any symptoms of arm edema or engorged veins along her chest wall I would favor a less invasive approach given that she has had multiple recent procedures.  I have recommended obtaining a carotid duplex to evaluate both carotid arteries and another attempt at a venous ultrasound to see if the subclavian vein stent to be visualized.  I am not entirely opposed to performing a venogram which would likely be diagnostic.  In the interim the patient should continue being on aspirin and Eliquis.  The patient was happy with this conservative approach.  We can always obtain a CT angiogram of the venous phase to better define the patency of the stent.      PLAN:  As above    The patient indicated an understanding of these issues and agreed to the plan and all questions were answered during the clinic visit.      Thank you for allowing me to participate in your patient's care.   Please do not hesitate to contact me with any questions.    Sincerely,  Samer F. Najjar, MD    Please note: Dragon speech recognition software was used to prepare this note. If a word or phrase is confusing, it is likely do to a failure of recognition.   Please contact me with any questions or clarifications.

## 2024-05-28 ENCOUNTER — TELEPHONE (OUTPATIENT)
Facility: CLINIC | Age: 69
End: 2024-05-28

## 2024-05-28 NOTE — TELEPHONE ENCOUNTER
1st,overdue reminder letter mailed out to patient    Imaging order    US LIVER (CPT=76705) (Order #[id number]) on 10/18/23

## 2024-08-16 DIAGNOSIS — E03.9 HYPOTHYROIDISM, UNSPECIFIED TYPE: ICD-10-CM

## 2024-08-16 RX ORDER — LEVOTHYROXINE SODIUM 0.12 MG/1
125 TABLET ORAL
Qty: 90 TABLET | Refills: 0 | Status: SHIPPED | OUTPATIENT
Start: 2024-08-16

## 2024-08-16 NOTE — TELEPHONE ENCOUNTER
A refill request was received for:  Requested Prescriptions     Pending Prescriptions Disp Refills    LEVOTHYROXINE 125 MCG Oral Tab [Pharmacy Med Name: Levothyroxine Sodium 125 MCG Oral Tablet] 90 tablet 0     Sig: TAKE 1 TABLET BY MOUTH ONCE DAILY BEFORE BREAKFAST     Last refill date:  2/20/24    Last office visit: 1/22/24      No future appointments.

## 2024-08-29 ENCOUNTER — TELEPHONE (OUTPATIENT)
Facility: CLINIC | Age: 69
End: 2024-08-29

## 2024-08-29 NOTE — TELEPHONE ENCOUNTER
1st,overdue reminder letter mailed out to patient   Labs  order  Orders Placed 1/29/24    AFP, Tumor Marker, Serum  CBC W Differential W Platelet  Comp Metabolic Panel (14)

## 2024-09-05 ENCOUNTER — LAB ENCOUNTER (OUTPATIENT)
Dept: LAB | Age: 69
End: 2024-09-05
Attending: INTERNAL MEDICINE
Payer: MEDICARE

## 2024-09-05 DIAGNOSIS — K74.69 OTHER CIRRHOSIS OF LIVER (HCC): ICD-10-CM

## 2024-09-05 LAB
AFP-TM SERPL-MCNC: 3.5 NG/ML
ALBUMIN SERPL-MCNC: 4.2 G/DL (ref 3.2–4.8)
ALBUMIN/GLOB SERPL: 1.3 {RATIO} (ref 1–2)
ALP LIVER SERPL-CCNC: 188 U/L
ALT SERPL-CCNC: 27 U/L
ANION GAP SERPL CALC-SCNC: 8 MMOL/L (ref 0–18)
AST SERPL-CCNC: 47 U/L (ref ?–34)
BASOPHILS # BLD AUTO: 0.05 X10(3) UL (ref 0–0.2)
BASOPHILS NFR BLD AUTO: 1.1 %
BILIRUB SERPL-MCNC: 0.6 MG/DL (ref 0.2–1.1)
BUN BLD-MCNC: 13 MG/DL (ref 9–23)
BUN/CREAT SERPL: 19.7 (ref 10–20)
CALCIUM BLD-MCNC: 9.3 MG/DL (ref 8.7–10.4)
CHLORIDE SERPL-SCNC: 108 MMOL/L (ref 98–112)
CO2 SERPL-SCNC: 24 MMOL/L (ref 21–32)
CREAT BLD-MCNC: 0.66 MG/DL
DEPRECATED RDW RBC AUTO: 45.1 FL (ref 35.1–46.3)
EGFRCR SERPLBLD CKD-EPI 2021: 97 ML/MIN/1.73M2 (ref 60–?)
EOSINOPHIL # BLD AUTO: 0.29 X10(3) UL (ref 0–0.7)
EOSINOPHIL NFR BLD AUTO: 6.3 %
ERYTHROCYTE [DISTWIDTH] IN BLOOD BY AUTOMATED COUNT: 16.9 % (ref 11–15)
FASTING STATUS PATIENT QL REPORTED: NO
GLOBULIN PLAS-MCNC: 3.3 G/DL (ref 2–3.5)
GLUCOSE BLD-MCNC: 102 MG/DL (ref 70–99)
HCT VFR BLD AUTO: 33.2 %
HGB BLD-MCNC: 9.8 G/DL
IMM GRANULOCYTES # BLD AUTO: 0.01 X10(3) UL (ref 0–1)
IMM GRANULOCYTES NFR BLD: 0.2 %
LYMPHOCYTES # BLD AUTO: 0.71 X10(3) UL (ref 1–4)
LYMPHOCYTES NFR BLD AUTO: 15.4 %
MCH RBC QN AUTO: 22 PG (ref 26–34)
MCHC RBC AUTO-ENTMCNC: 29.5 G/DL (ref 31–37)
MCV RBC AUTO: 74.4 FL
MONOCYTES # BLD AUTO: 0.44 X10(3) UL (ref 0.1–1)
MONOCYTES NFR BLD AUTO: 9.6 %
NEUTROPHILS # BLD AUTO: 3.1 X10 (3) UL (ref 1.5–7.7)
NEUTROPHILS # BLD AUTO: 3.1 X10(3) UL (ref 1.5–7.7)
NEUTROPHILS NFR BLD AUTO: 67.4 %
OSMOLALITY SERPL CALC.SUM OF ELEC: 290 MOSM/KG (ref 275–295)
PLATELET # BLD AUTO: 116 10(3)UL (ref 150–450)
PLATELETS.RETICULATED NFR BLD AUTO: 12.5 % (ref 0–7)
POTASSIUM SERPL-SCNC: 4 MMOL/L (ref 3.5–5.1)
PROT SERPL-MCNC: 7.5 G/DL (ref 5.7–8.2)
RBC # BLD AUTO: 4.46 X10(6)UL
SODIUM SERPL-SCNC: 140 MMOL/L (ref 136–145)
WBC # BLD AUTO: 4.6 X10(3) UL (ref 4–11)

## 2024-09-05 PROCEDURE — 85025 COMPLETE CBC W/AUTO DIFF WBC: CPT

## 2024-09-05 PROCEDURE — 82105 ALPHA-FETOPROTEIN SERUM: CPT

## 2024-09-05 PROCEDURE — 80053 COMPREHEN METABOLIC PANEL: CPT

## 2024-09-05 PROCEDURE — 36415 COLL VENOUS BLD VENIPUNCTURE: CPT

## 2024-09-09 ENCOUNTER — TELEPHONE (OUTPATIENT)
Facility: CLINIC | Age: 69
End: 2024-09-09

## 2024-09-09 NOTE — TELEPHONE ENCOUNTER
Message left on patient's personal voicemail, lab results were released to the patient however she now has a new onset of anemia.  I would like this evaluated more fully.  I would advise that she start with her primary physician and see if there is any signs of iron deficiency or other causes.    She also needs a follow up in the office with me.     Ok for RN to give above information

## 2024-09-11 NOTE — TELEPHONE ENCOUNTER
I spoke to Estelle.  She denies any black stools.  She does crave ice.  She has an appointment with Dr. Beauchamp next week to follow up with anemia, and ultrasound end of the month.  She accepted below appointment with Dr. Sewell   Given office directions.    Your Appointments      Wednesday October 09, 2024 3:20 PM  Follow Up Visit with Abel Sewell MD  [facility] [address] [address]  [phone]

## 2024-09-11 NOTE — TELEPHONE ENCOUNTER
Left message to help patient schedule appointment with Dr. Sewell.  He has openings in the beginning of October I was going to offer.    Patient viewed Dr. Sewell's result note in Epic:    She has an appointment to see Dr. Beauchamp on 9/16/24.  I entered 6 month lab recall into Epic.  Next due around 3/5/25.

## 2024-09-13 NOTE — PROGRESS NOTES
CC:  Lab Results (F/u on lab results )      Hx of CC:    Here for lab results showing anemia  Mom was diagnosed with colon cancer this year  Pt has No black or bloody stools  Craves ice  Some fatigue  No hx of anemia except that had a blood transfusion in February after surgery to remove her Port-A-Cath, right chest wall reconstruction and left mastectomy.  This surgery was at [facility].  She had a left subclavian vein stent placed and a history of a right carotid artery stent and is seeing Dr Najjar for this now.     She is on asa, no longer eliquis. She completed the 3 mos of Eliquis     Going to follow up with vascular ( Dr Morales) and her plastic surgeon     :  -TIA on asa   Breast cancer now with lesion at skin graft site   Carotid stenosis -seeing vascular Dr. 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- sees Dr Sewell   Thrombocytopenia-chronic she has seen hematology for this    Started smoking again  Didn't like wellbutrin  Has quit cut cold turkey    No CP or SOB         Patient Active Problem List   Diagnosis    TIA (transient ischemic attack)    Hypothyroid    History of breast cancer    Carotid stenosis    Other cirrhosis of liver (HCC)    Malignant neoplasm of right breast in female, estrogen receptor positive (HCC)    Status post right mastectomy    Screening mammogram, encounter for    Axillary mass, left    Thrombocytopenia (HCC)    Splenomegaly    Centrilobular emphysema (HCC)    Osteopenia of multiple sites    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     Sulfa Antibiotics ANAPHYLAXIS     SOME TYPE OF BLUE DYE USED IN BIOPSY. Pt had pcn at same time and is not sure which caused her  rxn.     Blue Dye ANAPHYLAXIS     Patient reports not IV dye used during CT angiogram/Cerebral angiograms - had both in the past without reaction.  Patient reports it was a dye used in 2005 to trace the lymphatic system during a mastectomy.      Current Meds:  Current Outpatient Medications   Medication Sig Dispense Refill    LEVOTHYROXINE 125 MCG Oral Tab TAKE 1 TABLET BY MOUTH ONCE DAILY BEFORE BREAKFAST 90 tablet 0    atorvastatin 20 MG Oral Tab Take 1 tablet (20 mg total) by mouth nightly. 90 tablet 0    aspirin 325 MG Oral Tab Take 1 tablet (325 mg total) by mouth daily.          History:  Past Medical History:    Biliary calculus    Removed gallbladder    Breast cancer (HCC)    Carotid artery stenosis    R , s/p radiation    Cirrhosis of liver (HCC)    Disorder of liver    Disorder of thyroid    History of blood transfusion    Meniscus, medial, bucket handle tear, old    Obesity    Osteoarthritis    Other and unspecified hyperlipidemia    Personal history of antineoplastic chemotherapy    Presence of implantable pulmonary artery pressure and heart rate monitoring system    S/P TRAM (transverse rectus abdominis muscle) flap breast reconstruction    Stroke (HCC)    TIA    Thyroid disease    TIA (transient ischemic attack)      Past Surgical History:   Procedure Laterality Date    Appendectomy      Breast reconstruction  2005    R Side, Multiple Procedures     Breast surgery      Chemotherapy      Cholecystectomy  1978    Mastectomy right      Other      masectomy    Radiation right      Removal gallbladder      Stent placemt retro carotid      2011--right carotid      Family History   Problem Relation Age of Onset    Other (Other) Mother         Aneurysm 1974    Colon Cancer Mother     Cancer Maternal Aunt         Brain    Breast Cancer Self 49      Family Status   Relation Status    Mo Alive    Sis Alive    Bro Alive    Mat Aunt (Not Specified)    Self (Not Specified)      Social History     Socioeconomic  History    Marital status:    Occupational History    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 [employer]   Tobacco Use    Smoking status: Light Smoker     Types: Cigarettes    Smokeless tobacco: Never   Vaping Use    Vaping status: Never Used   Substance and Sexual Activity    Alcohol use: No     Alcohol/week: 0.0 standard drinks of alcohol    Drug use: No   Other Topics Concern    Caffeine Concern Yes     Comment: Coffee     Social Determinants of Health     Financial Resource Strain: Low Risk  (2/21/2024)    Received from [facility]    Overall Financial Resource Strain (CARDIA)     Difficulty of Paying Living Expenses: Not hard at all   Food Insecurity: No Food Insecurity (2/21/2024)    Received from [facility]    Hunger Vital Sign     Worried About Running Out of Food in the Last Year: Never true     Ran Out of Food in the Last Year: Never true   Transportation Needs: No Transportation Needs (2/21/2024)    [facility] from [facility]    PRAPARE - Transportation     Lack of Transportation (Medical): No     Lack of Transportation (Non-Medical): No   Housing Stability: Low Risk  (2/21/2024)    Received from [facility]    Housing Stability Vital Sign     Unable to Pay for Housing in the Last Year: No     Number of Places Lived in the Last Year: 1     Unstable Housing in the Last Year: No            ROS:  General:  No fever, no fatigue, no weight changes  HEENT:  Denies congestion or nasal discharge  Cardio:  No chest pain   Pulmonary:  No cough, no SOB  GI:  No N/V/D  Dermatologic:  No rashes    Physical:    /66   Pulse 71   Ht 4' 11\" (1.499 m)   Wt 181 lb 9.6 oz (82.4 kg)   LMP 01/01/2005 (Exact Date)   SpO2 100%   BMI 36.68 kg/m²     General:  Alert, appropriate, no  acute distress  HEENT:  Normocephalic, supple. Moist mucus membranes.  Cardio:  RRR, no murmurs, S1, S2  Pulmonary:  Clear bilaterally, good air entry  Chest: no signs of infection, scars R breast , some left breast tissue remains  Dermatologic:  No rashes or lesions  EXT: no edema  MS: normal movement   NEURO: no gross deficits       Assessment and Plan:    1. Anemia, unspecified type  Hgb 9.8  Check iron, suspect NELDA given ice cravings  Needs colonoscopy and evaluation for iron deficiency anemia given mom with colon cancer refer to GI for this.  Patient has not been able to tolerate oral iron in the past, refer to hematology    - CBC W Differential W Platelet [E]; Future  - Iron And Tibc [E]; Future  - Ferritin [E]; Future  - Gastro Referral - In Network  - Reticulocyte Count [E]; Future  - Oncology/Hematology Referral - In Network  - CBC W Differential W Platelet [E]  - Iron And Tibc [E]  - Ferritin [E]  - Reticulocyte Count [E]    2. History of breast cancer  Follow up with plastic surgeon     3. Encounter for screening mammogram for malignant neoplasm of breast      - [facility] 2D+3D SCREENING LEFT (CPT=77067-52/72837); Future    4. Family history of colon cancer in mother      - Gastro Referral - In Network    5. Stenosis of carotid artery, unspecified laterality  Follow up with Dr Mckeon  Continue aspirin    6. Tobacco use  Pt counseled on smoking cessation for between 3-10 minutes. Discussed risks of smoking such as increased risk of cancers and heart disease. Discussed medication options as well as lifestyle modifications. Will readdress at next visit.    F/u 2-3 mos for AWV    There are no diagnoses linked to this encounter.  GASTRO - INTERNAL  OP REFERRAL TO [facility] HEMATOLOGY/ONCOLOGY GROUP  [facility] 2D+3D SCREENING LEFT (CPT=77067-52/74474)  Orders Placed This Encounter   Procedures    CBC W Differential W Platelet [E]     Standing Status:   Future     Number of Occurrences:   1     Standing  Expiration Date:   9/16/2025     Order Specific Question:   Release to patient     Answer:   Immediate    Iron And Tibc [E]     Standing Status:   Future     Number of Occurrences:   1     Standing Expiration Date:   9/16/2025     Order Specific Question:   Release to patient     Answer:   Immediate    Ferritin [E]     Standing Status:   Future     Number of Occurrences:   1     Standing Expiration Date:   9/16/2025     Order Specific Question:   Release to patient     Answer:   Immediate    Reticulocyte Count [E]     Standing Status:   Future     Number of Occurrences:   1     Standing Expiration Date:   9/16/2025     Order Specific Question:   Release to patient     Answer:   Immediate

## 2024-09-16 ENCOUNTER — OFFICE VISIT (OUTPATIENT)
Dept: INTERNAL MEDICINE CLINIC | Facility: CLINIC | Age: 69
End: 2024-09-16
Payer: MEDICARE

## 2024-09-16 VITALS
WEIGHT: 181.63 LBS | DIASTOLIC BLOOD PRESSURE: 66 MMHG | SYSTOLIC BLOOD PRESSURE: 130 MMHG | BODY MASS INDEX: 36.62 KG/M2 | HEIGHT: 59 IN | HEART RATE: 71 BPM | OXYGEN SATURATION: 100 %

## 2024-09-16 DIAGNOSIS — Z80.0 FAMILY HISTORY OF COLON CANCER IN MOTHER: ICD-10-CM

## 2024-09-16 DIAGNOSIS — I65.29 STENOSIS OF CAROTID ARTERY, UNSPECIFIED LATERALITY: ICD-10-CM

## 2024-09-16 DIAGNOSIS — Z12.31 ENCOUNTER FOR SCREENING MAMMOGRAM FOR MALIGNANT NEOPLASM OF BREAST: ICD-10-CM

## 2024-09-16 DIAGNOSIS — D64.9 ANEMIA, UNSPECIFIED TYPE: ICD-10-CM

## 2024-09-16 DIAGNOSIS — Z85.3 HISTORY OF BREAST CANCER: Primary | ICD-10-CM

## 2024-09-16 LAB
BASOPHILS # BLD AUTO: 0.03 X10(3) UL (ref 0–0.2)
BASOPHILS NFR BLD AUTO: 0.7 %
DEPRECATED HBV CORE AB SER IA-ACNC: 9.4 NG/ML
DEPRECATED RDW RBC AUTO: 49.1 FL (ref 35.1–46.3)
EOSINOPHIL # BLD AUTO: 0.22 X10(3) UL (ref 0–0.7)
EOSINOPHIL NFR BLD AUTO: 5 %
ERYTHROCYTE [DISTWIDTH] IN BLOOD BY AUTOMATED COUNT: 17.7 % (ref 11–15)
HCT VFR BLD AUTO: 33 %
HGB BLD-MCNC: 9.5 G/DL
HGB RETIC QN AUTO: 22.8 PG (ref 28.2–36.6)
IMM GRANULOCYTES # BLD AUTO: 0.01 X10(3) UL (ref 0–1)
IMM GRANULOCYTES NFR BLD: 0.2 %
IMM RETICS NFR: 0.27 RATIO (ref 0.1–0.3)
IRON SATN MFR SERPL: 4 %
IRON SERPL-MCNC: 23 UG/DL
LYMPHOCYTES # BLD AUTO: 0.62 X10(3) UL (ref 1–4)
LYMPHOCYTES NFR BLD AUTO: 14.1 %
MCH RBC QN AUTO: 21.9 PG (ref 26–34)
MCHC RBC AUTO-ENTMCNC: 28.8 G/DL (ref 31–37)
MCV RBC AUTO: 76.2 FL
MONOCYTES # BLD AUTO: 0.39 X10(3) UL (ref 0.1–1)
MONOCYTES NFR BLD AUTO: 8.9 %
NEUTROPHILS # BLD AUTO: 3.13 X10 (3) UL (ref 1.5–7.7)
NEUTROPHILS # BLD AUTO: 3.13 X10(3) UL (ref 1.5–7.7)
NEUTROPHILS NFR BLD AUTO: 71.1 %
PLATELET # BLD AUTO: 109 10(3)UL (ref 150–450)
RBC # BLD AUTO: 4.33 X10(6)UL
RETICS # AUTO: 82.3 X10(3) UL (ref 22.5–147.5)
RETICS/RBC NFR AUTO: 1.9 %
TIBC SERPL-MCNC: 535 UG/DL (ref 250–425)
TRANSFERRIN SERPL-MCNC: 359 MG/DL (ref 250–380)
WBC # BLD AUTO: 4.4 X10(3) UL (ref 4–11)

## 2024-09-16 PROCEDURE — 82728 ASSAY OF FERRITIN: CPT | Performed by: FAMILY MEDICINE

## 2024-09-16 PROCEDURE — 84466 ASSAY OF TRANSFERRIN: CPT | Performed by: FAMILY MEDICINE

## 2024-09-16 PROCEDURE — 83540 ASSAY OF IRON: CPT | Performed by: FAMILY MEDICINE

## 2024-09-16 PROCEDURE — 85045 AUTOMATED RETICULOCYTE COUNT: CPT | Performed by: FAMILY MEDICINE

## 2024-09-16 PROCEDURE — 85025 COMPLETE CBC W/AUTO DIFF WBC: CPT | Performed by: FAMILY MEDICINE

## 2024-10-01 ENCOUNTER — HOSPITAL ENCOUNTER (OUTPATIENT)
Dept: ULTRASOUND IMAGING | Facility: HOSPITAL | Age: 69
Discharge: HOME | End: 2024-10-01
Attending: SURGERY
Payer: MEDICARE

## 2024-10-01 DIAGNOSIS — I82.B12 SUBCLAVIAN VEIN OCCLUSION, LEFT (HCC): ICD-10-CM

## 2024-10-01 DIAGNOSIS — I65.29 STENOSIS OF CAROTID ARTERY, UNSPECIFIED LATERALITY: ICD-10-CM

## 2024-10-01 DIAGNOSIS — S25.302S: ICD-10-CM

## 2024-10-01 PROCEDURE — 93880 EXTRACRANIAL BILAT STUDY: CPT | Performed by: SURGERY

## 2024-10-01 PROCEDURE — 93971 EXTREMITY STUDY: CPT | Performed by: SURGERY

## 2024-10-09 ENCOUNTER — TELEPHONE (OUTPATIENT)
Facility: CLINIC | Age: 69
End: 2024-10-09

## 2024-10-09 ENCOUNTER — OFFICE VISIT (OUTPATIENT)
Facility: CLINIC | Age: 69
End: 2024-10-09
Payer: MEDICARE

## 2024-10-09 VITALS
HEIGHT: 59 IN | WEIGHT: 178 LBS | HEART RATE: 98 BPM | BODY MASS INDEX: 35.88 KG/M2 | SYSTOLIC BLOOD PRESSURE: 143 MMHG | DIASTOLIC BLOOD PRESSURE: 73 MMHG

## 2024-10-09 DIAGNOSIS — K76.0 FATTY LIVER: Primary | ICD-10-CM

## 2024-10-09 DIAGNOSIS — D50.9 IRON DEFICIENCY ANEMIA, UNSPECIFIED IRON DEFICIENCY ANEMIA TYPE: ICD-10-CM

## 2024-10-09 PROBLEM — D50.0 IRON DEFICIENCY ANEMIA DUE TO CHRONIC BLOOD LOSS: Status: ACTIVE | Noted: 2024-10-09

## 2024-10-09 PROCEDURE — 99204 OFFICE O/P NEW MOD 45 MIN: CPT | Performed by: INTERNAL MEDICINE

## 2024-10-09 NOTE — TELEPHONE ENCOUNTER
Scheduled for:  Colonoscopy 32817 EGD [id number]  Provider Name:  Dr Sewell  Date:  2/6/2025  Location:  [facility]  Sedation:  MAC  Time:  (pt is aware that ENDO will call the day before the procedure to confirm arrival time)  Prep:  Golytely  Meds/Allergies Reconciled?:  Physician Reviewed  Diagnosis with codes:    Liver disease  Fatty liver K76.0   Anemia D50.9  Was patient informed to call insurance with codes (Y/N):  Yes  Referral sent?:  Referral was sent at the time of electronic surgical scheduling.  [facility] or [facility] notified?:  I sent an electronic request to Endo Scheduling and received a confirmation today.  Medication Orders:  Patient is aware to NOT take iron pills, herbal meds and diet supplements for 7 days before exam. Also to NOT take any form of alcohol, recreational drugs and any forms of ED meds 24-72 hours before exam.   Misc Orders:  N/A   Further instructions given by staff:  I discussed the prep instructions with the patient which she verbally understood. I provided patient with prep instruction's sheet in office.      Patient was informed about the new cancellation policy for his/her procedure. Patient was also given a copy of the cancellation policy at the time of the appointment and verbalized understanding.

## 2024-10-09 NOTE — PROGRESS NOTES
Estelle Dubose is a 69 year old female.    HPI:     Chief Complaint   Patient presents with    Follow - Up     The patient is a 69-year-old female who has a history of prior cholecystectomy, breast cancer, liver cirrhosis, TIA, prior appendectomy who seen today in the office for anemia.    She reports that she has been noted to have anemia, she denies seeing any blood per rectum or melena.  She does have a family history of colon cancer in her mother.    The patient does have a history of liver disease/fibrosis/fatty liver.  She will continue work on healthy weight and diet.  She denies any alcohol.      HISTORY:  Past Medical History:    Biliary calculus    Removed gallbladder    Breast cancer (HCC)    Carotid artery stenosis    R , s/p radiation    Cirrhosis of liver (HCC)    Disorder of liver    Disorder of thyroid    History of blood transfusion    Meniscus, medial, bucket handle tear, old    Obesity    Osteoarthritis    Other and unspecified hyperlipidemia    Personal history of antineoplastic chemotherapy    Presence of implantable pulmonary artery pressure and heart rate monitoring system    S/P TRAM (transverse rectus abdominis muscle) flap breast reconstruction    Stroke (HCC)    TIA    Thyroid disease    TIA (transient ischemic attack)      Past Surgical History:   Procedure Laterality Date    Appendectomy      Breast reconstruction  2005    R Side, Multiple Procedures     Breast surgery      Chemotherapy      Cholecystectomy  1978    Mastectomy right      Other      masectomy    Radiation right      Removal gallbladder      Stent placemt retro carotid      2011--right carotid      Family History   Problem Relation Age of Onset    Other (Other) Mother         Aneurysm 1974    Colon Cancer Mother     Cancer Maternal Aunt         Brain    Breast Cancer Self 49      Social History:   Social History     Socioeconomic History    Marital status:    Occupational History    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 [employer]   Tobacco Use    Smoking status: Light Smoker     Types: Cigarettes    Smokeless tobacco: Never   Vaping Use    Vaping status: Never Used   Substance and Sexual Activity    Alcohol use: No     Alcohol/week: 0.0 standard drinks of alcohol    Drug use: No   Other Topics Concern    Caffeine Concern Yes     Comment: Coffee     Social Drivers of Health     Financial Resource Strain: Low Risk  (2/21/2024)    Received from [facility]    Overall Financial Resource Strain (CARDIA)     Difficulty of Paying Living Expenses: Not hard at all   Food Insecurity: No Food Insecurity (2/21/2024)    Received from [facility]    Hunger Vital Sign     Worried About Running Out of Food in the Last Year: Never true     Ran Out of Food in the Last Year: Never true   Transportation Needs: No Transportation Needs (2/21/2024)    Received from [facility]    PRAPARE - Transportation     Lack of Transportation (Medical): No     Lack of Transportation (Non-Medical): No   Housing Stability: Low Risk  (2/21/2024)    Received from [facility]    Housing Stability Vital Sign     Unable to Pay for Housing in the Last Year: No     Number of Places Lived in the Last Year: 1     Unstable Housing in the Last Year: No        Medications (Active prior to today's visit):  Current Outpatient Medications   Medication Sig Dispense Refill    LEVOTHYROXINE 125 MCG Oral Tab TAKE 1 TABLET BY MOUTH ONCE DAILY BEFORE BREAKFAST 90 tablet 0    atorvastatin 20 MG Oral Tab Take 1 tablet (20 mg total) by mouth nightly. 90 tablet 0    aspirin 325 MG Oral Tab Take 1 tablet (325 mg total) by mouth daily.         Allergies:  Allergies[1]      ROS:   The patient denies any chest pain or shortness of breath,  No neurologic or  dermatologic symptoms.     PHYSICAL EXAM:   Blood pressure (!) 162/81, pulse 98, height 4' 11\" (1.499 m), weight 178 lb (80.7 kg), last menstrual period 01/01/2005.    The patient appears their stated age and is in no acute distress  HEENT- anicteric sclera, neck no lymphadnopathy, OP- clear with no masses or lesions  Chest- Clear bilaterally, no wheezing,  Heart- regular rate, no murmur or gallop  Abdomen- Soft and nontender, nondistended  Ext- no clubbing or cyanosis  Skin- no rashes or lesions  Neuro- appropriate response, alert, no confusion  .  ASSESSMENT/PLAN:   Assessment     The patient has a history of anemia, discussed GI workup which would include colonoscopy and EGD.  Risks and benefits reviewed.  Advised iron infusions through the infusion center to help boost her iron stores and then monitor after infusions are completed.    She does have a history of fatty liver, discussed the risk of progression, cirrhosis, risk of liver cancer, risk of decompensated liver disease.  She will continue to work on healthy diet and weight, liver ultrasound every 6 months plus labs.    Plan  Liver disease/fatty liver  - healthy diet and weight  - liver ultrasound, call to set up   - lab work every 6 months    Anemia  - iron infusions/venofer or similar through infusion center  - colonoscopy and EGD with MAC and Golytely prep    EGD/Colonoscopy consent: I have discussed the risks, benefits, and alternatives to EGD/colonoscopy with the patient [who demonstrated understanding], including but not limited to the risks of bleeding, infection, pain, death, as well as the risks of anesthesia and perforation all leading to prolonged hospitalization, surgical intervention, or even death. I also specifically mentioned the miss rate of colonoscopy of 5-10% in the best of all circumstances. All questions were answered to the patient's satisfaction. The patient signed informed consent and elected to proceed with colonoscopy with  intervention [i.e. polypectomy, stent placement, etc.] as indicated.       Orders This Visit:  No orders of the defined types were placed in this encounter.      Meds This Visit:  Requested Prescriptions      No prescriptions requested or ordered in this encounter       Imaging & Referrals:  None       Abel Sewell MD  [facility]              [1]   Allergies  Allergen Reactions    Penicillins ANAPHYLAXIS     Pt also had blue dye at same time and is not sure which caused her rxn.     Sulfa Antibiotics ANAPHYLAXIS     SOME TYPE OF BLUE DYE USED IN BIOPSY. Pt had pcn at same time and is not sure which caused her rxn.     Blue Dye ANAPHYLAXIS     Patient reports not IV dye used during CT angiogram/Cerebral angiograms - had both in the past without reaction.  Patient reports it was a dye used in 2005 to trace the lymphatic system during a mastectomy.

## 2024-10-10 NOTE — TELEPHONE ENCOUNTER
Good morning,     Patient has Medicare, no prior authorization is needed for Iron infusion.     Okay to schedule patient.       Thank You,  Eun

## 2024-10-31 ENCOUNTER — TELEPHONE (OUTPATIENT)
Dept: HEMATOLOGY/ONCOLOGY | Facility: HOSPITAL | Age: 69
End: 2024-10-31

## 2024-11-04 ENCOUNTER — TELEPHONE (OUTPATIENT)
Dept: HEMATOLOGY/ONCOLOGY | Facility: HOSPITAL | Age: 69
End: 2024-11-04

## 2024-11-06 NOTE — TELEPHONE ENCOUNTER
Patient viewed [employer] message informing her to call the the infusion center back to set up iron infusions.

## 2024-11-15 ENCOUNTER — OFFICE VISIT (OUTPATIENT)
Dept: HEMATOLOGY/ONCOLOGY | Facility: HOSPITAL | Age: 69
End: 2024-11-15
Attending: INTERNAL MEDICINE
Payer: MEDICARE

## 2024-11-15 VITALS
WEIGHT: 174 LBS | DIASTOLIC BLOOD PRESSURE: 50 MMHG | TEMPERATURE: 98 F | OXYGEN SATURATION: 99 % | HEART RATE: 79 BPM | SYSTOLIC BLOOD PRESSURE: 140 MMHG | RESPIRATION RATE: 16 BRPM | BODY MASS INDEX: 35 KG/M2

## 2024-11-15 DIAGNOSIS — D50.0 IRON DEFICIENCY ANEMIA DUE TO CHRONIC BLOOD LOSS: Primary | ICD-10-CM

## 2024-11-15 PROCEDURE — 96374 THER/PROPH/DIAG INJ IV PUSH: CPT

## 2024-11-15 NOTE — PATIENT INSTRUCTIONS
Iron Sucrose Injection  Brands: Venofer  Uses  For anemia.  Instructions  This medicine is given as an IV injection into a vein.  Read and make sure you understand the instructions for measuring your dose and using the syringe before using this medicine.  Always inspect the medicine before using.  The liquid should be clear and dark brown.  Do not use the medicine if it contains any particles or if it has changed color.  Keep medicine at room temperature. Protect from light.  Speak with your nurse or pharmacist about how long the medicine can be stored safely at room temperature or in the refrigerator before it needs to be discarded.  Never use any medicine that has .  Discard any remaining medicine after your dose is given.  If you miss a dose, contact your doctor for instructions.  Drug interactions can change how medicines work or increase risk for side effects. Tell your health care providers about all medicines taken. Include prescription and over-the-counter medicines, vitamins, and herbal medicines. Speak with your doctor or pharmacist before starting or stopping any medicine.  It is very important that you follow your doctor's instructions for all blood tests.  Cautions  Tell your doctor and pharmacist if you ever had an allergic reaction to a medicine.  Do not use the medication any more than instructed.  This medicine may cause dizziness or fainting, especially after exercising or in hot weather. Be very careful when standing or sitting up quickly.  Tell the doctor or pharmacist if you are pregnant, planning to be pregnant, or breastfeeding.  Ask your pharmacist how to properly throw away used needles or syringes.  Do not share this medicine with anyone who has not been prescribed this medicine.  Side Effects  The following is a list of some common side effects from this medicine. Please speak with your doctor about what you should do if you experience these or other side effects.  constipation or  diarrhea  dizziness  swelling of the legs, feet, and hands  muscle cramps  nausea and vomiting  changes in taste or unpleasant taste  Call your doctor or get medical help right away if you notice any of these more serious side effects:  chest pain  fainting  severe or persistent headache  fast or irregular heart beats  severe stomach or bowel pain  blurring or changes of vision  A few people may have an allergic reaction to this medicine. Symptoms can include difficulty breathing, skin rash, itching, swelling, or severe dizziness. If you notice any of these symptoms, seek medical help quickly.  Extra  Please speak with your doctor, nurse, or pharmacist if you have any questions about this medicine.  [email or web address]  IMPORTANT NOTE: This document tells you briefly how to take your medicine, but it does not tell you all there is to know about it. Your doctor or pharmacist may give you other documents about your medicine. Please talk to them if you have any questions. Always follow their advice. There is a more complete description of this medicine available in English. Scan this code on your smartphone or tablet or use the web address below. You can also ask your pharmacist for a printout. If you have any questions, please ask your pharmacist. The display and use of this drug information is subject to Terms of Use. Copyright(c) 2023 [employer]   © 1540-2106 [employer]. All rights reserved. This information is not intended as a substitute for professional medical care. Always follow your healthcare professional's instructions.

## 2024-11-15 NOTE — PROGRESS NOTES
Patient here for 200mg Venofer dose 1 of 5. Patient denies any issues or concerns. Oriented patient to infusion area and reviewed Venofer education. Discussed possible side effects and s/s of adverse reaction.     Ordering Provider: Melodie Walsh Exp: after 5 doses     Venofer given IVP over 3 mins with free flowing NS, patient tolerated without difficulty or complaint. No s/s of adverse reaction noted. VSS after administration.    Reviewed next apt date/time: 11/22 4pm Venofer dose 2 of 5. Printed AVS provided including Venofer information.     Education Record  Learner:  Patient  Disease / Diagnosis: NELDA  Barriers / Limitations:  None  Method:  Discussion and Printed material  General Topics:  Medication and Plan of care reviewed  Outcome:  Shows understanding

## 2024-11-17 NOTE — PROGRESS NOTES
HPI:     Estelle Dubose is a 69 year old female who presents for a pre-operative physical exam. Patient is to have breast revision , to be done by Dr. Smallwood at [facility] on 11/25/24.    Phone : [phone]  Pt has had previous anesthesia:  Yes.  Previous complications:  No  Patient had surgery earlier this year   No shortness of breath with walking, some chronic shortness of breath with stairs but this has been evaluated before by cardiology   METS> 4   Packing , walking, cleaning,  moving soon      No CP or SOB  No fevers       Iron deficiency anemia, just had first iron infusion  EGD and cscope scheduled     + smoking       Saw  Dr. Alicia for cardiology last year   Had nl echo and angiogram 5/5/23   No hx sleep apnea   history includes:  -TIA on asa   Breast cancer   Liver cirrhosis from radiation  Thrombocytopenia-chronic she has seen hematology for this  Iron deficiency anemia              Current Outpatient Medications   Medication Sig Dispense Refill    LEVOTHYROXINE 125 MCG Oral Tab TAKE 1 TABLET BY MOUTH ONCE DAILY BEFORE BREAKFAST 90 tablet 0    atorvastatin 20 MG Oral Tab Take 1 tablet (20 mg total) by mouth nightly. 90 tablet 0    aspirin 325 MG Oral Tab Take 1 tablet (325 mg total) by mouth daily.        Allergies: Allergies[1]   Past Medical History:    Biliary calculus    Removed gallbladder    Breast cancer (HCC)    Carotid artery stenosis    R , s/p radiation    Cirrhosis of liver (HCC)    Disorder of liver    Disorder of thyroid    History of blood transfusion    Meniscus, medial, bucket handle tear, old    Obesity    Osteoarthritis    Other and unspecified hyperlipidemia    Personal history of antineoplastic chemotherapy    Presence of implantable pulmonary artery pressure and heart rate monitoring system    S/P TRAM (transverse rectus abdominis muscle) flap breast reconstruction    Stroke (HCC)    TIA    Thyroid disease    TIA (transient ischemic attack)      Past Surgical History:    Procedure Laterality Date    Appendectomy      Breast reconstruction  2005    R Side, Multiple Procedures     Breast surgery      Chemotherapy      Cholecystectomy  1978    Mastectomy right      Other      masectomy    Radiation right      Removal gallbladder      Stent placemt retro carotid      2011--right carotid      Family History   Problem Relation Age of Onset    Other (Other) Mother         Aneurysm 1974    Colon Cancer Mother     Cancer Maternal Aunt         Brain    Breast Cancer Self 49      Social History:      REVIEW OF SYSTEMS:   GENERAL: feels well otherwise  HENT: denies sore throat  LUNGS: denies shortness of breath with exertion  CARDIOVASCULAR: denies chest pain on exertion  GI: denies abdominal pain  : denies dysuria  MUSCULOSKELETAL: denies joint pain    EXAM:   LMP 01/01/2005 (Exact Date)   GENERAL: well developed, well nourished,in no apparent distress  HEENT: atraumatic, normocephalic, O/P clear  EYES: nl conjunctiva   NECK: supple,no adenopathy  LUNGS: clear to auscultation  CARDIO: RRR without murmur  GI: good BS's,no masses, HSM or tenderness  EXTREMITIES: no edema  NEURO: Alert, no focal deficits            ASSESSMENT AND PLAN:   Estelle Dubose is a 69 year old female who presents for a pre-operative physical exam.    Pt has the following conditions: history of TIA, history of cirrhosis from radiation , iron deficiency anemia    She is a current smoker. Smoking cessation advised    Patient has no chest pain and had a normal angiogram last year    Pt is an acceptable surgical candidate and may proceed with surgery pending results of blood tests ordered.    This pre op physical will be sent back  to the referring physician once requested testing is back and reviewed     1. Pre-operative general physical examination    - CBC W Differential W Platelet [E]; Future  - Comp Metabolic Panel (14) [E]; Future  - CBC W Differential W Platelet [E]  - Comp Metabolic Panel (14) [E]          2.  Tobacco use  Advised cessation     3. Hypothyroidism, unspecified type      - TSH W Reflex To Free T4 [E]; Future  - levothyroxine 125 MCG Oral Tab; Take 1 tablet (125 mcg total) by mouth before breakfast.  Dispense: 90 tablet; Refill: 3  - TSH W Reflex To Free T4 [E]           [1]   Allergies  Allergen Reactions    Penicillins ANAPHYLAXIS     Pt also had blue dye at same time and is not sure which caused her rxn.     Sulfa Antibiotics ANAPHYLAXIS     SOME TYPE OF BLUE DYE USED IN BIOPSY. Pt had pcn at same time and is not sure which caused her rxn.     Blue Dye ANAPHYLAXIS     Patient reports not IV dye used during CT angiogram/Cerebral angiograms - had both in the past without reaction.  Patient reports it was a dye used in 2005 to trace the lymphatic system during a mastectomy.

## 2024-11-18 ENCOUNTER — OFFICE VISIT (OUTPATIENT)
Dept: INTERNAL MEDICINE CLINIC | Facility: CLINIC | Age: 69
End: 2024-11-18
Payer: MEDICARE

## 2024-11-18 ENCOUNTER — HOSPITAL ENCOUNTER (OUTPATIENT)
Dept: ULTRASOUND IMAGING | Age: 69
Discharge: HOME | End: 2024-11-18
Attending: INTERNAL MEDICINE
Payer: MEDICARE

## 2024-11-18 VITALS
HEIGHT: 59 IN | OXYGEN SATURATION: 100 % | DIASTOLIC BLOOD PRESSURE: 80 MMHG | HEART RATE: 77 BPM | BODY MASS INDEX: 35.4 KG/M2 | SYSTOLIC BLOOD PRESSURE: 130 MMHG | WEIGHT: 175.63 LBS

## 2024-11-18 DIAGNOSIS — I10 HYPERTENSION, UNSPECIFIED TYPE: ICD-10-CM

## 2024-11-18 DIAGNOSIS — Z01.818 PRE-OPERATIVE GENERAL PHYSICAL EXAMINATION: Primary | ICD-10-CM

## 2024-11-18 DIAGNOSIS — K76.0 FATTY LIVER: ICD-10-CM

## 2024-11-18 DIAGNOSIS — Z72.0 TOBACCO USE: ICD-10-CM

## 2024-11-18 DIAGNOSIS — E03.9 HYPOTHYROIDISM, UNSPECIFIED TYPE: ICD-10-CM

## 2024-11-18 LAB
ALBUMIN SERPL-MCNC: 4.1 G/DL (ref 3.2–4.8)
ALBUMIN/GLOB SERPL: 1.2 {RATIO} (ref 1–2)
ALP LIVER SERPL-CCNC: 219 U/L
ALT SERPL-CCNC: 27 U/L
ANION GAP SERPL CALC-SCNC: 9 MMOL/L (ref 0–18)
AST SERPL-CCNC: 50 U/L (ref ?–34)
BASOPHILS # BLD AUTO: 0.07 X10(3) UL (ref 0–0.2)
BASOPHILS NFR BLD AUTO: 1.3 %
BILIRUB SERPL-MCNC: 0.4 MG/DL (ref 0.2–1.1)
BUN BLD-MCNC: 12 MG/DL (ref 9–23)
BUN/CREAT SERPL: 18.8 (ref 10–20)
CALCIUM BLD-MCNC: 9.7 MG/DL (ref 8.7–10.4)
CHLORIDE SERPL-SCNC: 110 MMOL/L (ref 98–112)
CO2 SERPL-SCNC: 22 MMOL/L (ref 21–32)
CREAT BLD-MCNC: 0.64 MG/DL
DEPRECATED RDW RBC AUTO: 46.5 FL (ref 35.1–46.3)
EGFRCR SERPLBLD CKD-EPI 2021: 97 ML/MIN/1.73M2 (ref 60–?)
EOSINOPHIL # BLD AUTO: 0.28 X10(3) UL (ref 0–0.7)
EOSINOPHIL NFR BLD AUTO: 5.2 %
ERYTHROCYTE [DISTWIDTH] IN BLOOD BY AUTOMATED COUNT: 17.9 % (ref 11–15)
FASTING STATUS PATIENT QL REPORTED: NO
GLOBULIN PLAS-MCNC: 3.3 G/DL (ref 2–3.5)
GLUCOSE BLD-MCNC: 74 MG/DL (ref 70–99)
HCT VFR BLD AUTO: 32.6 %
HGB BLD-MCNC: 9.5 G/DL
IMM GRANULOCYTES # BLD AUTO: 0.03 X10(3) UL (ref 0–1)
IMM GRANULOCYTES NFR BLD: 0.6 %
LYMPHOCYTES # BLD AUTO: 0.83 X10(3) UL (ref 1–4)
LYMPHOCYTES NFR BLD AUTO: 15.4 %
MCH RBC QN AUTO: 22.1 PG (ref 26–34)
MCHC RBC AUTO-ENTMCNC: 29.1 G/DL (ref 31–37)
MCV RBC AUTO: 76 FL
MONOCYTES # BLD AUTO: 0.54 X10(3) UL (ref 0.1–1)
MONOCYTES NFR BLD AUTO: 10 %
NEUTROPHILS # BLD AUTO: 3.65 X10 (3) UL (ref 1.5–7.7)
NEUTROPHILS # BLD AUTO: 3.65 X10(3) UL (ref 1.5–7.7)
NEUTROPHILS NFR BLD AUTO: 67.5 %
OSMOLALITY SERPL CALC.SUM OF ELEC: 290 MOSM/KG (ref 275–295)
PLATELET # BLD AUTO: 130 10(3)UL (ref 150–450)
PLATELETS.RETICULATED NFR BLD AUTO: 10 % (ref 0–7)
POTASSIUM SERPL-SCNC: 4.3 MMOL/L (ref 3.5–5.1)
PROT SERPL-MCNC: 7.4 G/DL (ref 5.7–8.2)
RBC # BLD AUTO: 4.29 X10(6)UL
SODIUM SERPL-SCNC: 141 MMOL/L (ref 136–145)
TSI SER-ACNC: 0.67 UIU/ML (ref 0.55–4.78)
WBC # BLD AUTO: 5.4 X10(3) UL (ref 4–11)

## 2024-11-18 PROCEDURE — 76705 ECHO EXAM OF ABDOMEN: CPT | Performed by: INTERNAL MEDICINE

## 2024-11-18 PROCEDURE — 99214 OFFICE O/P EST MOD 30 MIN: CPT | Performed by: FAMILY MEDICINE

## 2024-11-18 PROCEDURE — 85025 COMPLETE CBC W/AUTO DIFF WBC: CPT | Performed by: FAMILY MEDICINE

## 2024-11-18 PROCEDURE — 80053 COMPREHEN METABOLIC PANEL: CPT | Performed by: FAMILY MEDICINE

## 2024-11-18 PROCEDURE — 84443 ASSAY THYROID STIM HORMONE: CPT | Performed by: FAMILY MEDICINE

## 2024-11-18 RX ORDER — LEVOTHYROXINE SODIUM 125 UG/1
125 TABLET ORAL
Qty: 90 TABLET | Refills: 3 | Status: SHIPPED | OUTPATIENT
Start: 2024-11-18

## 2024-11-19 DIAGNOSIS — K76.9 LIVER DISEASE: Primary | ICD-10-CM

## 2024-11-20 NOTE — PROGRESS NOTES
Msg left on vm, ultrasound stable. No mass or lesion noted.  Would advise we continue to monitor liver and repeat in 6 months time.

## 2024-11-21 ENCOUNTER — TELEPHONE (OUTPATIENT)
Dept: INTERNAL MEDICINE CLINIC | Facility: CLINIC | Age: 69
End: 2024-11-21

## 2024-11-21 NOTE — TELEPHONE ENCOUNTER
Selma fuentes presurgical nurse from [facility] left a voicemail message.    Yesterday they faxed an EKG to the office for Dr Beauchamp to review.  If the EKG looks okay, they are asking for Dr. Beauchamp to please sign off in My Chart Everywhere or fax the approval to [phone].

## 2024-11-22 ENCOUNTER — OFFICE VISIT (OUTPATIENT)
Dept: HEMATOLOGY/ONCOLOGY | Facility: HOSPITAL | Age: 69
End: 2024-11-22
Attending: INTERNAL MEDICINE
Payer: MEDICARE

## 2024-11-22 VITALS
RESPIRATION RATE: 18 BRPM | OXYGEN SATURATION: 99 % | SYSTOLIC BLOOD PRESSURE: 146 MMHG | DIASTOLIC BLOOD PRESSURE: 75 MMHG | BODY MASS INDEX: 36 KG/M2 | WEIGHT: 177 LBS | HEART RATE: 80 BPM | TEMPERATURE: 98 F

## 2024-11-22 DIAGNOSIS — D50.0 IRON DEFICIENCY ANEMIA DUE TO CHRONIC BLOOD LOSS: Primary | ICD-10-CM

## 2024-11-22 PROCEDURE — 96374 THER/PROPH/DIAG INJ IV PUSH: CPT

## 2024-11-22 NOTE — PROGRESS NOTES
Pt here for Venofer 200mg dose 2 of 5 . Pt denies any issues or concerns. Has surgery Monday 11/25 for mastectomy revision. Pt spoke with surgeon who confirmed she is OK to receive iron today.     Ordering Provider: Abel Sewell MD  Order Exp: 3 more doses remaining in order     Pt tolerated infusion without difficulty or complaint. Reviewed next apt date/time: 12/3 @ 2p      Education Record  Learner:  Patient  Disease / Diagnosis: NELDA  Barriers / Limitations:  None  Method:  Reinforcement  General Topics:  Plan of care reviewed  Outcome:  Shows understanding

## 2024-12-03 ENCOUNTER — APPOINTMENT (OUTPATIENT)
Dept: HEMATOLOGY/ONCOLOGY | Facility: HOSPITAL | Age: 69
End: 2024-12-03
Attending: INTERNAL MEDICINE
Payer: MEDICARE

## 2024-12-04 ENCOUNTER — TELEPHONE (OUTPATIENT)
Facility: CLINIC | Age: 69
End: 2024-12-04

## 2024-12-04 NOTE — TELEPHONE ENCOUNTER
1st Reminder letter was sent to patient [facility] pending:     Hepatic Function Panel (7) (Order #[id number]) on 10/9/24

## 2024-12-09 ENCOUNTER — OFFICE VISIT (OUTPATIENT)
Dept: HEMATOLOGY/ONCOLOGY | Facility: HOSPITAL | Age: 69
End: 2024-12-09
Attending: INTERNAL MEDICINE
Payer: MEDICARE

## 2024-12-09 VITALS
SYSTOLIC BLOOD PRESSURE: 146 MMHG | DIASTOLIC BLOOD PRESSURE: 45 MMHG | RESPIRATION RATE: 18 BRPM | BODY MASS INDEX: 35 KG/M2 | OXYGEN SATURATION: 100 % | TEMPERATURE: 97 F | WEIGHT: 171.69 LBS | HEART RATE: 80 BPM

## 2024-12-09 DIAGNOSIS — D50.0 IRON DEFICIENCY ANEMIA DUE TO CHRONIC BLOOD LOSS: Primary | ICD-10-CM

## 2024-12-09 PROCEDURE — 96374 THER/PROPH/DIAG INJ IV PUSH: CPT

## 2024-12-09 NOTE — PROGRESS NOTES
Patient arrives to infusion center for 200mg Venofer dose 3 of 5. Patient denies any issues or concerns. Reinforced plan of care.     Ordering Provider: Melodie Walsh Exp: after 5 doses     Venofer given IVP over 3 mins, patient appeared to tolerate well without difficulty or complaint. No s/s of adverse reaction noted.    Reviewed next apt date/time: 12/17 1pm    Education Record  Learner:  Patient  Disease / Diagnosis: NELDA  Barriers / Limitations:  None  Method:  Discussion and Printed material  General Topics:  Medication and Plan of care reviewed  Outcome:  Shows understanding

## 2024-12-17 ENCOUNTER — OFFICE VISIT (OUTPATIENT)
Age: 69
End: 2024-12-17
Attending: INTERNAL MEDICINE
Payer: MEDICARE

## 2024-12-17 VITALS
SYSTOLIC BLOOD PRESSURE: 148 MMHG | RESPIRATION RATE: 18 BRPM | DIASTOLIC BLOOD PRESSURE: 68 MMHG | OXYGEN SATURATION: 98 % | TEMPERATURE: 98 F | HEART RATE: 83 BPM

## 2024-12-17 DIAGNOSIS — D50.0 IRON DEFICIENCY ANEMIA DUE TO CHRONIC BLOOD LOSS: Primary | ICD-10-CM

## 2024-12-17 NOTE — PROGRESS NOTES
Patient arrives to infusion center for 200mg Venofer dose 4 of 5. Patient denies any issues or concerns. Reinforced plan of care.     Ordering Provider: Melodie Walsh Exp: after 5 doses     Venofer given IVP over 3 mins, patient appeared to tolerate well without difficulty or complaint. No s/s of adverse reaction noted.    Reviewed next apt date/time: yes      Education Record  Learner:  Patient  Disease / Diagnosis: NELDA  Barriers / Limitations:  None  Method:  Discussion and Printed material  General Topics:  Medication and Plan of care reviewed  Outcome:  Shows understanding

## 2024-12-30 ENCOUNTER — APPOINTMENT (OUTPATIENT)
Age: 69
End: 2024-12-30
Attending: INTERNAL MEDICINE
Payer: MEDICARE

## 2024-12-30 ENCOUNTER — OFFICE VISIT (OUTPATIENT)
Age: 69
End: 2024-12-30
Attending: INTERNAL MEDICINE
Payer: MEDICARE

## 2024-12-30 VITALS
TEMPERATURE: 98 F | OXYGEN SATURATION: 99 % | RESPIRATION RATE: 16 BRPM | HEART RATE: 75 BPM | BODY MASS INDEX: 34 KG/M2 | WEIGHT: 170.5 LBS | SYSTOLIC BLOOD PRESSURE: 146 MMHG | DIASTOLIC BLOOD PRESSURE: 72 MMHG

## 2024-12-30 DIAGNOSIS — D50.0 IRON DEFICIENCY ANEMIA DUE TO CHRONIC BLOOD LOSS: Primary | ICD-10-CM

## 2024-12-30 NOTE — PROGRESS NOTES
Pt here for Venofer 5/5 . Pt denies any issues or concerns. Reports mild headaches that does not last long with previous infusions.      Ordering Provider: Melodie Walsh Exp: After this dose.      Pt tolerated infusion without difficulty or complaint. No s&s of reaction. PIV removed, gauze and coban applied.   Reviewed next apt date/time: N/A, last infusion.       Education Record  Learner:  Patient  Disease / Diagnosis: NELDA  Barriers / Limitations:  None  Method:  Reinforcement  General Topics:  Plan of care reviewed  Outcome:  Shows understanding

## 2025-01-10 NOTE — PROGRESS NOTES
Subjective:   Estelle Dubose is a 69 year old female who presents for a Initial Annual Wellness Visit (outside the first 12 months of Medicare eligibility, no prior AWV) and scheduled follow up of multiple significant but stable problems.       Has Iron deficiency anemia, completed iron infusions  Still craves ice and still fatigue but always on the go   cscope and EGD scheduled    No appetite   Has lost a few lbs    C/o dysuria      + smoking - has cut down to 3 a day          Had nl echo and angiogram 5/5/23      history includes:  -TIA on asa   HL- not consistent about taking her statin   Breast cancer   Liver cirrhosis from radiation  Thrombocytopenia-chronic she has seen hematology for this    Does not drink alcohol    Had bilateral mastectomy         History/Other:   Fall Risk Assessment:   She has been screened for Falls and is low risk.      Cognitive Assessment:   She had a completely normal cognitive assessment - see flowsheet entries       Functional Ability/Status:   Estelle Dubose has some abnormal functions as listed below:  She has Vision problems based on screening of functional status. She has problems with Memory based on screening of functional status.       Depression Screening (PHQ):  PHQ-2 SCORE: 0  , done 1/13/2025             Advanced Directives:   She does have a Living Will but we do NOT have it on file in Epic.    She does NOT have a Power of  for Health Care. [Do you have a healthcare power of ?: No]  Discussed Advance Care Planning with patient (and family/surrogate if present). Standard forms made available to patient in After Visit Summary.      Patient Active Problem List   Diagnosis    TIA (transient ischemic attack)    Hypothyroid    Carotid stenosis    Other cirrhosis of liver (HCC)    Malignant neoplasm of right breast in female, estrogen receptor positive (HCC)    Status post right mastectomy    Thrombocytopenia (HCC)    Splenomegaly    Centrilobular emphysema  (HCC)    Osteopenia of multiple sites    Elevated alkaline phosphatase level    Iron deficiency anemia due to chronic blood loss     Allergies:  She is allergic to penicillins, sulfa antibiotics, and blue dye.    Current Medications:  Outpatient Medications Marked as Taking for the 1/13/25 encounter (Office Visit) with Melanie Beauchamp MD   Medication Sig    levothyroxine 125 MCG Oral Tab Take 1 tablet (125 mcg total) by mouth before breakfast.    atorvastatin 20 MG Oral Tab Take 1 tablet (20 mg total) by mouth nightly.    aspirin 325 MG Oral Tab Take 1 tablet (325 mg total) by mouth daily.       Medical History:  She  has a past medical history of Biliary calculus (01/01/1978), Breast cancer (HCC), Carotid artery stenosis, Cirrhosis of liver (HCC) (01/01/2013), Disorder of liver, Disorder of thyroid, History of blood transfusion (01/01/2005), Meniscus, medial, bucket handle tear, old, Obesity, Osteoarthritis, Other and unspecified hyperlipidemia, Personal history of antineoplastic chemotherapy, Presence of implantable pulmonary artery pressure and heart rate monitoring system, S/P TRAM (transverse rectus abdominis muscle) flap breast reconstruction, Stroke (HCC) (2010 and 2014), Thyroid disease, and TIA (transient ischemic attack).  Surgical History:  She  has a past surgical history that includes other; stent placemt retro carotid; Breast reconstruction (2005); cholecystectomy (1978); mastectomy right; chemotherapy; radiation right; Breast Surgery; appendectomy; and removal gallbladder.   Family History:  Her family history includes Breast Cancer (age of onset: 47) in her self; Cancer in her maternal aunt; Colon Cancer in her mother; Other in her mother.  Social History:  She  reports that she has been smoking cigarettes. She has never used smokeless tobacco. She reports that she does not drink alcohol and does not use drugs.    Tobacco:  Social History     Tobacco Use   Smoking Status Light Smoker    Types:  Cigarettes   Smokeless Tobacco Never     E-Cigarettes/Vaping       Questions Responses    E-Cigarette Use Never User           Tobacco cessation counseling for 3-10 minutes (add E/M code #52801).      CAGE Alcohol Screen:   CAGE screening score of 0 on 1/13/2025, showing low risk of alcohol abuse.      Patient Care Team:  Melanie Beauchamp MD as PCP - General (Family Medicine)  Sandy Elizabeth PT as Physical Therapist (Physical Therapy)  Najjar, Samer F, MD (SURGERY, VASCULAR)    Review of Systems         GENERAL: feels well , no fevers, has lost a few lbs   SKIN: denies any unusual skin lesions  EYES: vision getting blurrier at night   HEENT: denies nasal congestion or sore throat  LUNGS: denies shortness of breath with exertion, a little cough since decreased smoking, no fevers  non productive  CARDIOVASCULAR: denies chest pain on exertion  GI: no abd pain, diarrhea or constipation   Breast: Bilateral mastectomies.  Left side well-healed.  Right side has chronic scar tissue.  No signs of infection or drainage.  : + dysuria  MUSCULOSKELETAL: denies muscle pain  NEURO: denies headaches,  ENDOCRINE: no hx of DM     Objective:   Physical Exam       /74   Pulse 83   Ht 5' (1.524 m)   Wt 171 lb 6.4 oz (77.7 kg)   LMP 01/01/2005 (Exact Date)   SpO2 100%   BMI 33.47 kg/m²  Estimated body mass index is 33.47 kg/m² as calculated from the following:    Height as of this encounter: 5' (1.524 m).    Weight as of this encounter: 171 lb 6.4 oz (77.7 kg).    Medicare Hearing Assessment:   Hearing Screening    Screening Method: Questionnaire  I have a problem hearing over the telephone: No I have trouble following the conversations when two or more people are talking at the same time: No   I have trouble understanding things on the TV: No I have to strain to understand conversations: No   I have to worry about missing the telephone ring or doorbell: No I have trouble hearing conversations in a noisy background such  as a crowded room or restaurant: No   I get confused about where sounds come from: No I misunderstand some words in a sentence and need to ask people to repeat themselves: No   I especially have trouble understanding the speech of women and children: No I have trouble understanding the speaker in a large room such as at a meeting or place of [employer]: No   Many people I talk to seem to mumble (or don't speak clearly): No People get annoyed because I misunderstand what they say: No   I misunderstand what others are saying and make inappropriate responses: No I avoid social activities because I cannot hear well and fear I will reply improperly: No   Family members and friends have told me they think I may have hearing loss: No             Visual Acuity:   Right Eye Visual Acuity: Uncorrected Right Eye Chart Acuity: 20/20   Left Eye Visual Acuity: Uncorrected Left Eye Chart Acuity: 20/20   Both Eyes Visual Acuity: Uncorrected Both Eyes Chart Acuity: 20/20   Able To Tolerate Visual Acuity: Yes        Assessment & Plan:   Estelle Dubose is a 69 year old female who presents for a Medicare Assessment.         1. Physical exam (Primary)  -     CBC With Differential With Platelet; Future; Expected date: 01/13/2025  -     Comp Metabolic Panel (14); Future; Expected date: 01/13/2025  -     Lipid Panel; Future; Expected date: 01/13/2025  -     Prevnar 20 (PCV20) [81946]  2. Hypertension, unspecified type  3. Tobacco use  -     CT LUNG LD SCREENING(CPT=71271); Future; Expected date: 01/13/2025  4. Hypothyroidism, unspecified type  5. Iron deficiency anemia, unspecified iron deficiency anemia type  -     Iron And Tibc; Future; Expected date: 01/13/2025  -     Ferritin; Future; Expected date: 01/13/2025  6. Thrombocytopenia (HCC)  -     Oncology/Hematology Referral - In Network  7. Dysuria  -     Urinalysis with Culture Reflex; Future; Expected date: 01/13/2025  8. Blurry vision  -     Ophthalmology Referral - In Network      1.  Physical exam    - CBC With Differential With Platelet; Future  - Comp Metabolic Panel (14); Future  - Lipid Panel; Future  - Prevnar 20 (PCV20) [64820]    2. Hypertension, unspecified type  Controlled     3. Tobacco use  Pt counseled on smoking cessation for between 3-10 minutes. Discussed risks of smoking such as increased risk of cancers and heart disease. Discussed medication options as well as lifestyle modifications. Will readdress at next visit.    - CT LUNG LD SCREENING(CPT=71271); Future    4. Hypothyroidism, unspecified type  Check TSH   On levothyroxine     5. Iron deficiency anemia, unspecified iron deficiency anemia type  Status post iron infusion  Still has some ice cravings.  Has EGD and colonoscopy scheduled  Recheck iron and ferritin and follow-up with hematology.  Will also advise oral iron if needed after labs back  Has lost a few lbs, need to monitor this as well . Follow up 3 mos , possible sooner pending labs and discussed important of EGD/ cscope.   - Iron And Tibc [E]; Future  - Ferritin [E]; Future    6. Thrombocytopenia (HCC)  Chronic and may be related to history of cirrhosis.  Recheck CBC and follow-up with hematology  - Oncology/Hematology Referral - In Network    7. Dysuria    - Urinalysis with Culture Reflex [E]; Future    8. Blurry vision      - Ophthalmology Referral - In Network    9. TIA (transient ischemic attack)    Importance of taking asked daily, taking statin on a regular basis, making sure blood pressure is well-controlled    10. Status post right mastectomy  Doing well  Status post bilateral stents needs    11. Other cirrhosis of liver (HCC)  Stable.  Cirrhosis from radiation.  Follow-up with GI on regular basis.  Recently liver ultrasound.    12. Osteopenia of multiple sites  Dexa 11/22 shows osteopenia.  Recommend daily vitamin D and get calcium in the diet and supplementation.  Recheck DEXA this year or next year.  Dexa scan ordered     13. Malignant neoplasm of right  breast in female, estrogen receptor positive, unspecified site of breast (HCC)  Status post bilateral mastectomy  Follow-up with oncology      15. Stenosis of carotid artery, unspecified laterality  Discussed portance aspirin daily and taking her statin on daily basis    16. Centrilobular emphysema (HCC)  Stable.  No complaints.  Recommend smoking cessation.    17. Elevated alkaline phosphatase level  From liver ( elevated GGT and alk phos liver)  Stable, monitor q 6 mos   History of liver cirrhosis from radiation  Liver US monitor by GI        F/u 3 mos   The patient indicates understanding of these issues and agrees to the plan.  Reinforced healthy diet, lifestyle, and exercise.      No follow-ups on file.     Melanie Beauchamp MD, 1/10/2025     Supplementary Documentation:   General Health:  In the past six months, have you lost more than 10 pounds without trying?: 1 - Yes  Has your appetite been poor?: Yes  Type of Diet: Other (did not answer question)  How does the patient maintain a good energy level?: Other (did not answer question)  How would you describe your daily physical activity?: Moderate  How would you describe your current health state?: Good  How do you maintain positive mental well-being?: Visiting Family  On a scale of 0 to 10, with 0 being no pain and 10 being severe pain, what is your pain level?: 2 - (Mild)  In the past six months, have you experienced urine leakage?: 0-No  At any time do you feel concerned for the safety/well-being of yourself and/or your children, in your home or elsewhere?: No  Have you had any immunizations at another office such as Influenza, Hepatitis B, Tetanus, or Pneumococcal?: Yes (Influenza)    Health Maintenance   Topic Date Due    Colorectal Cancer Screening  Never done    Pneumococcal Vaccine: 50+ Years (1 of 2 - PCV) Never done    Zoster Vaccines (1 of 2) Never done    Annual Physical  10/06/2023    Mammogram  05/25/2024    COVID-19 Vaccine (1 - 2024-25 season)  Never done    Influenza Vaccine (1) 10/01/2024    Annual Depression Screening  01/01/2025    Fall Risk Screening (Annual)  01/01/2025    Tobacco Cessation Counseling  01/01/2025    DEXA Scan  Completed    Meningococcal B Vaccine  Aged Out      Initial Lung Cancer Screening: Shared Decision Making    Estelle Dubose is a 69 year old female without current symptoms of lung cancer.  History   Smoking Status    Light Smoker    Years: 30.00    Types: Cigarettes   Smokeless Tobacco    Never         We counseled the importance of maintaining cigarette smoking abstinence if she is a former smoker and the importance of smoking cessation if she is a current smoker and we discussed and furnished information about tobacco cessation interventions.  I reviewed and confirmed that the patient meets screening eligibility criteria:  69 year old, absence of signs or symptoms of lung cancer, cigarette smoking history is 30 pack-years OR the patient was a previous smoker and has quit for 0 years.    I reviewed the risks, harms and benefits of screening (including: false positive rates which may lead to additional testing to further evaluate findings), false negatives, and radiation exposure.  I reviewed the importance of adherence to annual lung cancer screening (LDCT), the impact of co-morbidities with treatment for a cancer finding, and the patient's willingness to undergo diagnosis and treatment if there is a positive finding.    A written order for the exam was given to the patient.    I will contact the patient with the CT lung screening results and provide the follow-up that is needed.    I spent 3 minutes with the patient providing this information.      Melanie Beauchamp MD

## 2025-01-15 NOTE — TELEPHONE ENCOUNTER
Schedulers:  Patient called to reschedule colonoscopy and EGD   Orders in 10/9/2024 office visit with Dr. Sewell    Thank you      Instructions    Liver disease/fatty liver  - healthy diet and weight  - liver ultrasound, call to set up   - lab work every 6 months     Anemia  - iron infusions/venofer or similar through infusion center  - colonoscopy and EGD with MAC and Golytely prep

## 2025-01-16 NOTE — TELEPHONE ENCOUNTER
Called patient - rescheduled her canceled colonoscopy and EGD with Dr. Sewell to 5/15/2025 at [facility].    Please refer to telephone encounter dated 10/9/2024 for scheduling orders.    Telephone encounter closed.

## 2025-03-28 NOTE — TELEPHONE ENCOUNTER
TAWANDA LMOVM FOR PT TO SCHEDULE US. Please transfer to [phone] if patient calls main line. Thanks!

## 2025-04-22 NOTE — TELEPHONE ENCOUNTER
1st,Overdue reminder letter sent out via My chart for the following:  Orders Placed 9/92024    AFP, Tumor Marker, Serum  CBC W Differential W Platelet  Hepatic Function Panel (7)

## 2025-04-23 NOTE — TELEPHONE ENCOUNTER
PPD -    Patient is asking if a prior authorization is required for her colonoscopy and EGD with Dr. Sewell on 5/15/2025 at [facility]?    Please advise - thank you!

## 2025-04-25 NOTE — TELEPHONE ENCOUNTER
TAWANDA LMOVM FOR PT TO SCHEDULE US. Please transfer to [phone] if patient calls main line. Thanks!

## 2025-04-28 NOTE — TELEPHONE ENCOUNTER
PA for [id number] and [id number] has been approved via [employer], authorization number [id number], valid 5/15/25-7/15/25. Referral updated.

## 2025-05-15 NOTE — ANESTHESIA POSTPROCEDURE EVALUATION
Patient: Estelle Dubose    Procedure Summary       Date: 05/15/25 Room / Location: [facility] ENDOSCOPY 04 / [facility] ENDOSCOPY    Anesthesia Start:  Anesthesia Stop:     Procedures:       COLONOSCOPY \ ESOPHAGOGASTRODUODENOSCOPY      ESOPHAGOGASTRODUODENOSCOPY (EGD) Diagnosis:       Fatty liver      Iron deficiency anemia, unspecified iron deficiency anemia type      (Fatty liver \ Iron deficiency anemia, unspecified iron deficiency anemia type)    Surgeons: Abel Sewell MD Anesthesiologist:     Anesthesia Type: Not recorded ASA Status: Not recorded            Anesthesia Type: No value filed.    Vitals Value Taken Time   /48 05/15/25   Temp  05/15/25    Pulse 69 05/15/25    Resp 16 05/15/25    SpO2 98% 05/15/25        [facility] AN Post Evaluation:   Patient Evaluated in Patient location: Endo recovery.  Patient Participation: complete - patient participated  Level of Consciousness: awake and alert  Pain Score: 0  Pain Management: adequate  Airway Patency:patent  Yes    Nausea/Vomiting: none  Cardiovascular Status: acceptable  Respiratory Status: acceptable  Postoperative Hydration acceptable      Mila Lisa CRNA  5/15/2025 9:54 AM

## 2025-05-15 NOTE — H&P
History & Physical Examination    Patient Name: Estelle Dubose  MRN: [id number]  [facility]: [id number]  YOB: 1955    Diagnosis: colon screening  Anemia  Cirrhosis - rule out varices      Prescriptions Prior to Admission[1]  Current Hospital Medications[2]    Allergies: Allergies[3]    Past Medical History[4]  Past Surgical History[5]  Family History[6]  Social History     Tobacco Use    Smoking status: Light Smoker     Types: Cigarettes    Smokeless tobacco: Never    Tobacco comments:     3 cigs a day   Substance Use Topics    Alcohol use: No     Alcohol/week: 0.0 standard drinks of alcohol       SYSTEM Check if Review is Normal Check if Physical Exam is Normal If not normal, please explain:   HEENT [x ] [ x]    NECK & BACK [x ] [x ]    HEART [x ] [ x]    LUNGS [x ] [ x]    ABDOMEN [x ] [x ]    UROGENITAL [ ] [ ]    EXTREMITIES [x ] [x ]    OTHER        [ x ] I have discussed the risks and benefits and alternatives with the patient/family.  They understand and agree to proceed with plan of care.  [ x ] I have reviewed the History and Physical done within the last 30 days.  Any changes noted above.    Abel Sewell MD  5/15/2025  8:54 AM         [1]   Medications Prior to Admission   Medication Sig Dispense Refill Last Dose/Taking    levothyroxine 125 MCG Oral Tab Take 1 tablet (125 mcg total) by mouth before breakfast. 90 tablet 3 5/14/2025    atorvastatin 20 MG Oral Tab Take 1 tablet (20 mg total) by mouth nightly. 90 tablet 0 5/12/2025    aspirin 325 MG Oral Tab Take 1 tablet (325 mg total) by mouth in the morning.   5/11/2025   [2]   Current Facility-Administered Medications   Medication Dose Route Frequency    lactated ringers infusion   Intravenous Continuous   [3]   Allergies  Allergen Reactions    Penicillins ANAPHYLAXIS     Pt also had blue dye at same time and is not sure which caused her rxn.     Sulfa Antibiotics ANAPHYLAXIS     SOME TYPE OF BLUE DYE USED IN BIOPSY. Pt had pcn at same time and  is not sure which caused her rxn.     Blue Dye ANAPHYLAXIS     Patient reports not IV dye used during CT angiogram/Cerebral angiograms - had both in the past without reaction.  Patient reports it was a dye used in 2005 to trace the lymphatic system during a mastectomy.   [4]   Past Medical History:   Biliary calculus    Removed gallbladder    Breast cancer (HCC)    2005 s/p mastectomy right with chemo & radiation; 2024 l mastectomy    Carotid artery stenosis    R , s/p radiation    Cirrhosis of liver (HCC)    Disorder of liver    Disorder of thyroid    Exposure to medical diagnostic radiation    History of blood transfusion    2024    Meniscus, medial, bucket handle tear, old    Obesity    Osteoarthritis    Other and unspecified hyperlipidemia    Personal history of antineoplastic chemotherapy    Presence of implantable pulmonary artery pressure and heart rate monitoring system    S/P TRAM (transverse rectus abdominis muscle) flap breast reconstruction    Stroke (HCC)    TIA    Thyroid disease    TIA (transient ischemic attack)    Visual impairment   [5]   Past Surgical History:  Procedure Laterality Date    Appendectomy      Breast reconstruction  2005    R Side, Multiple Procedures     Breast surgery      Chemotherapy      Cholecystectomy  1978    Mastectomy left      Mastectomy right      Other      masectomy    Radiation right      Stent placemt retro carotid      2011--right carotid   [6]   Family History  Problem Relation Age of Onset    Other (Other) Mother         Aneurysm 1974    Colon Cancer Mother     Cancer Maternal Aunt         Brain    Breast Cancer Self 49

## 2025-05-15 NOTE — DISCHARGE INSTRUCTIONS
Home Care Instructions for Colonoscopy and/or Gastroscopy with Sedation    Diet:  - Resume your regular diet as tolerated unless otherwise instructed.  - Start with light meals to minimize bloating.  - Do not drink alcohol today.    Medication:  - If you have questions about resuming your normal medications, please contact your Primary Care Physician.    Activities:  - Take it easy today. Do not return to work today.  - Do not drive today.  - Do not operate any machinery today (including kitchen equipment).    Colonoscopy:  - You may notice some rectal \"spotting\" (a little blood on the toilet tissue) for a day or two after the exam. This is normal.  - If you experience any rectal bleeding (not spotting), persistent tenderness or sharp severe abdominal pains, oral temperature over 100 degrees Fahrenheit, light-headedness or dizziness, or any other problems, contact your doctor.    Gastroscopy:  - You may have a sore throat for 2-3 days following the exam. This is normal. Gargling with warm salt water (1/2 tsp salt to 1 glass warm water) or using throat lozenges will help.  - If you experience any sharp pain in your neck, abdomen or chest, vomiting of blood, oral temperature over 100 degrees Fahrenheit, light-headedness or dizziness, or any other problems, contact your doctor.    **If unable to reach your doctor, please go to the [facility] Emergency Room**    - Your referring physician will receive a full report of your examination.  - If you do not hear from your doctor's office within two weeks of your biopsy, please call them for your results.    You may be able to see your laboratory results in [employer] between 4 and 7 business days.  In some cases, your physician may not have viewed the results before they are released to [employer].  If you have questions regarding your results contact the physician who ordered the test/exam by phone or via [employer] by choosing \"Ask a Medical Question.\"

## 2025-05-15 NOTE — OPERATIVE REPORT
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    Anesthesia:  MAC sedation    Technique:  After informed consent, the patient was placed in the left lateral recumbent position.  Digital rectal examination revealed no palpable intraluminal abnormalities.  An Olympus variable stiffness 190 series HD colonoscope was inserted into the rectum and advanced under direct vision by following the lumen to the cecum.  The colon was examined upon withdrawal in the left lateral position.    Following colonoscopy, an Olympus adult HD gastroscope was inserted into the hypopharynx and advanced under direct vision into the esophagus, stomach and duodenum.  The endoscope was withdrawn to the stomach where retroflexion of the annulus, body, cardia and fundus was performed.  The instrument was straightened, insufflated air and fluid were suctioned and the endoscope was withdrawn.  The procedures were well tolerated without immediate complication.      Findings:  The preparation of the colon was excellent.  The terminal ileum was examined for 4 cm and visually normal.  The ileocecal valve was well preserved. The visualized colonic mucosa from the cecum to the anal verge was normal with an intact vascular pattern.    Colon AVMs x 10 treated as follows; cecal AVMs x 2, the first 1 was 4 mm in size and were treated with APC, a second polyp in the segment was 10 to 12 mm in size and treated with APC and placement of 2 clips across the mucosal defect.  In the ascending  colon 5 more AVMs were noted and were approximately 4 mm in size and treated with APC, lastly, transverse colon AVMs x 3, each of these was 4 mm or less in size and treated with APC.    Colon polyps x 2, both polyps removed from the descending colon and were about 4 mm or so in size and removed by cold snare technique.  Endo Clip x 1 placed across one of the mucosal defects.    Diverticular disease noted in the left colon, no diverticulitis.    Small internal hemorrhoids noted on retroflexed view.    The esophagus showed 1 gastric varix in the distal esophagus, no stigmata, grade 1 or less in size.  No gastric varices noted.  The GE junction and diaphragmatic impression were at 38 cm and 40 cm.  The stomach distended appropriately with insufflated air.  The mucosa of the stomach showed subtle erythema and 1 small erosion, biopsy taken.  The duodenal bulb and post bulbar regions were normal.    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

## 2025-05-27 NOTE — TELEPHONE ENCOUNTER
Msg left on vm for pt to call -detailed message left on patient's personal voicemail to call back to discuss, labs and ultrasound every 6 months to monitor liver condition.  Also beginning of some blood vessels on her esophagus and would advise carvedilol or similar to prevent progression.  Like to discuss this.    Also AVMs cauterized on the colon like to see if this helps out with her anemia.

## 2025-05-28 NOTE — TELEPHONE ENCOUNTER
Health Maintenance Updated.    5 year colonoscopy recall entered into patient outreach in [facility].  Next colonoscopy will be due 5/15/2030.    I reviewed below complete result note with the patient over the phone and she voiced understanding.

## 2025-05-28 NOTE — TELEPHONE ENCOUNTER
----- Message from Abel Sewell sent at 5/27/2025  4:57 PM CDT -----  I wanted to get back to you with your colonoscopy and EGD results.      You had 2 colon polyps removed which were benign.  I would advise a repeat colonoscopy in 5 years to make sure no new polyps are forming.    You also have internal hemorrhoids and diverticulosis.  Additionally, small blood vessels were cauterized on the colon. These could have contributed to your anemia.     The upper endoscopy showed a small vein (varix ) in the esophagus.  Irritation of the stomach was noted.  No sign of infection with H pylori.       ----- Message -----  From: Lab, Background User  Sent: 5/15/2025   5:58 PM CDT  To: Abel Sewell MD

## 2025-05-28 NOTE — TELEPHONE ENCOUNTER
Patient contacted and results of her colonoscopy and EGD reviewed.  She had some AVMs on the colon which were cauterized, my hope is this helps out with her chronic anemia.  She feels like she is anemic again as she is craving ice/pica has returned.  Blood count placed and she will going get this done.    Upper endoscopy also showed 1 small esophageal varix, no stigmata.  Discussed possible options to prevent progression.  Plan primary progression prevention with carvedilol.  Risks of medication were discussed, she develops any side effects dizziness weakness etc. then she will stop taking it and call me immediately.  I will start her on a low-dose and monitor.    Patient knows that she needs labs every 6 months and ultrasound every 6 months as well.  To follow-up with me in the office every 6 months or so.

## 2025-06-18 NOTE — TELEPHONE ENCOUNTER
1st,overdue reminder letter mailed out to patient    Imaging order     Orders Placed  on 11/19/2024    US LIVER ([id number]=[id number])   As per  Notes from 1/19/2024  advise we continue to monitor liver and repeat in 6 months time.

## (undated) DIAGNOSIS — Z12.31 ENCOUNTER FOR SCREENING MAMMOGRAM FOR MALIGNANT NEOPLASM OF BREAST: Primary | ICD-10-CM

## (undated) DIAGNOSIS — R92.8 ABNORMAL MAMMOGRAM: ICD-10-CM

## (undated) DIAGNOSIS — E03.9 HYPOTHYROIDISM, UNSPECIFIED TYPE: ICD-10-CM

## (undated) DEVICE — SOLUTION  .9 3000ML

## (undated) DEVICE — 3M™ STERI-STRIP™ REINFORCED ADHESIVE SKIN CLOSURES, R1547, 1/2 IN X 4 IN (12 MM X 100 MM), 6 STRIPS/ENVELOPE: Brand: 3M™ STERI-STRIP™

## (undated) DEVICE — ARTHROSCOPY: Brand: MEDLINE INDUSTRIES, INC.

## (undated) DEVICE — AMBIENT SUPER TURBOVAC 90 IFS: Brand: COBLATION

## (undated) DEVICE — BLADE SHVR 13CM 4MM EXCLBR

## (undated) DEVICE — TUBING SET, GRAVITY, 4-SPIKE

## (undated) DEVICE — SUT VICRYL 4-0 P-3 J494G

## (undated) DEVICE — ENCORE® LATEX MICRO SIZE 8, STERILE LATEX POWDER-FREE SURGICAL GLOVE: Brand: ENCORE

## (undated) DEVICE — 3M™ STERI-STRIP™ COMPOUND BENZOIN TINCTURE 40 BAGS/CARTON 4 CARTONS/CASE C1544: Brand: 3M™ STERI-STRIP™

## (undated) DEVICE — GAMMEX® PI HYBRID SIZE 8, STERILE POWDER-FREE SURGICAL GLOVE, POLYISOPRENE AND NEOPRENE BLEND: Brand: GAMMEX

## (undated) DEVICE — NEEDLE HPO 18GA 1.5IN ECLPS

## (undated) DEVICE — PAD,ABDOMINAL,8"X7.5",STERILE,LF,1/PK: Brand: MEDLINE

## (undated) DEVICE — 12 ML SYRINGE LUER-LOCK TIP: Brand: MONOJECT

## (undated) DEVICE — SUCTION CANISTER, 3000CC,SAFELINER: Brand: DEROYAL

## (undated) DEVICE — 6 ML SYRINGE LUER-LOCK TIP: Brand: MONOJECT

## (undated) NOTE — LETTER
[facility]  [address], [place], [place]    Authorization for Surgical Operation and Procedure                               I hereby authorize Abel Sewell MD, my physician and his/her assistants (if applicable), which may include medical students, residents, and/or fellows, to perform the following surgical operation/ procedure and administer such anesthesia as may be determined necessary by my physician: Operation/Procedure name (s) COLONOSCOPY \ ESOPHAGOGASTRODUODENOSCOPY on Estelle Dubose   2.   I recognize that during the surgical operation/procedure, unforeseen conditions may necessitate additional or different procedures than those listed above.  I, therefore, further authorize and request that the above-named surgeon, assistants, or designees perform such procedures as are, in their judgment, necessary and desirable.    3.   My surgeon/physician has discussed prior to my surgery the potential benefits, risks and side effects of this procedure; the likelihood of achieving goals; and potential problems that might occur during recuperation.  They also discussed reasonable alternatives to the procedure, including risks, benefits, and side effects related to the alternatives and risks related to not receiving this procedure.  I have had all my questions answered and I acknowledge that no guarantee has been made as to the result that may be obtained.    4.   Should the need arise during my operation/procedure, which includes change of level of care prior to discharge, I also consent to the administration of blood and/or blood products.  Further, I understand that despite careful testing and screening of blood or blood products by collecting agencies, I may still be subject to ill effects as a result of receiving a blood transfusion and/or blood products.  The following are some, but not all, of the potential risks that can occur: fever and allergic reactions, hemolytic reactions,  transmission of diseases such as Hepatitis, AIDS and Cytomegalovirus (CMV) and fluid overload.  In the event that I wish to have an autologous transfusion of my own blood, or a directed donor transfusion, I will discuss this with my physician.  Check only if Refusing Blood or Blood Products  I understand refusal of blood or blood products as deemed necessary by my physician may have serious consequences to my condition to include possible death. I hereby assume responsibility for my refusal and release the hospital, its personnel, and my physicians from any responsibility for the consequences of my refusal.    o  Refuse   5.   I authorize the use of any specimen, organs, tissues, body parts or foreign objects that may be removed from my body during the operation/procedure for diagnosis, research or teaching purposes and their subsequent disposal by hospital authorities.  I also authorize the release of specimen test results and/or written reports to my treating physician on the hospital medical staff or other referring or consulting physicians involved in my care, at the discretion of the Pathologist or my treating physician.    6.   I consent to the photographing or videotaping of the operations or procedures to be performed, including appropriate portions of my body for medical, scientific, or educational purposes, provided my identity is not revealed by the pictures or by descriptive texts accompanying them.  If the procedure has been photographed/videotaped, the surgeon will obtain the original picture, image, videotape or CD.  The hospital will not be responsible for storage, release or maintenance of the picture, image, tape or CD.    7.   I consent to the presence of a  or observers in the operating room as deemed necessary by my physician or their designees.    8.   I recognize that in the event my procedure results in extended X-Ray/fluoroscopy time, I may develop a skin reaction.    9. If  I have a Do Not Attempt Resuscitation (DNAR) order in place, that status will be suspended while in the operating room, procedural suite, and during the recovery period unless otherwise explicitly stated by me (or a person authorized to consent on my behalf). The surgeon or my attending physician will determine when the applicable recovery period ends for purposes of reinstating the DNAR order.  10. Patients having a sterilization procedure: I understand that if the procedure is successful the results will be permanent and it will therefore be impossible for me to inseminate, conceive, or bear children.  I also understand that the procedure is intended to result in sterility, although the result has not been guaranteed.   11. I acknowledge that my physician has explained sedation/analgesia administration to me including the risk and benefits I consent to the administration of sedation/analgesia as may be necessary or desirable in the judgment of my physician.    I CERTIFY THAT I HAVE READ AND FULLY UNDERSTAND THE ABOVE CONSENT TO OPERATION and/or OTHER PROCEDURE.     ____________________________________  _________________________________        ______________________________  Signature of Patient    Signature of Responsible Person                Printed Name of Responsible Person                                      ____________________________________  _____________________________                ________________________________  Signature of Witness        Date  Time         Relationship to Patient    STATEMENT OF PHYSICIAN My signature below affirms that prior to the time of the procedure; I have explained to the patient and/or his/her legal representative, the risks and benefits involved in the proposed treatment and any reasonable alternative to the proposed treatment. I have also explained the risks and benefits involved in refusal of the proposed treatment and alternatives to the proposed treatment and have  answered the patient's questions. If I have a significant financial interest in a co-management agreement or a significant financial interest in any product or implant, or other significant relationship used in this procedure/surgery, I have disclosed this and had a discussion with my patient.     _____________________________________________________              _____________________________  (Signature of Physician)                                                                                         (Date)                                   (Time)  Patient Name: Estelle Dubose      : 1955      Printed: 2025     Medical Record #: [id number]                                      Page 1 of 1

## (undated) NOTE — LETTER
08/03/21        Karthik Kumari  [address] [place] [address]      Dear Oswaldo Loredo,    [facility] records indicate that you have outstanding lab work and or testing that was ordered for you and has not yet been completed:    US LIVER WITH ELASTOGRAPHY

## (undated) NOTE — LETTER
4/22/2025          Estelle Dubose    [address] [facility] [address]         Dear Estelle,    Our records indicate that the tests ordered for you by Abel Sewell MD  have not been done.  If you have, in fact, already completed the tests or you do not wish to have the tests done, please contact our office at THE NUMBER LISTED BELOW.  Otherwise, please proceed with the testing.  Enclosed is a duplicate order for your convenience.    Labs Order:    AFP, Tumor Marker, Serum  CBC W Differential W Platelet  Hepatic Function Panel (7)         Sincerely,    Abel Sewell MD  [address] [address] [phone]

## (undated) NOTE — LETTER
Dr. Josiah Morrow MD     [facility], [facility], [address] [place] [id number] [phone]       06/05/23    Bertha Fong      Dear Dr. Gareld Romberg is a 74year old female  who presented for a pre-operative physical exam. Pt had an abnormal stress test during initial pre op testing, but she was referred to cardiology. Dr. Shahida Sahu saw Cedric Tenorio. She had a normal echo and angiogram and per cardiology note 5/14/23, she may proceed with surgery without further testing. She is feeling well today. No complaints of chest pain or shortness of breath and has almost quick smoking. Patient may proceed with surgery and is at acceptable risk. Please see my attached history and physical.     Please call if you have any questions. Sincerely,      MD Roberth Iqbal Santana Calvert  [phone]

## (undated) NOTE — LETTER
09/14/19        Mendez Everett  [address] [facility]      Dear Masha Baez,    [facility] records indicate that you have outstanding lab work and or testing that was ordered for you and has not yet been completed:  Orders Placed This Encounter

## (undated) NOTE — LETTER
Dr. Mikki Pisano MD     [facility], [facility] [id number] [phone]       10/19/23    Alex Bingham    To Whom it may concern,    Please allow Staci Rao to return to work 10/18/23 with no restrictions.     Please let me know if you have any questions,      Kirstin Knowles MD

## (undated) NOTE — LETTER
AUTHORIZATION FOR SURGICAL OPERATION OR OTHER PROCEDURE    1. I hereby authorize Dr. Avery Tafoya  and [place] Risk Ident [place], [facility] staff assigned to my case to perform the following operation and/or procedure at the [facility], [facility]:    Cortisone injection in Right knee   _______________________________________________________________________________________________      _______________________________________________________________________________________________    2. My physician has explained the nature and purpose of the operation or other procedure, possible alternative methods of treatment, the risks involved, and the possibility of complication to me. I acknowledge that no guarantee has been made as to the result that may be obtained. 3.  I recognize that, during the course of this operation, or other procedure, unforseen conditions may necessitate additional or different procedure than those listed above. I, therefore, further authorize and request that the above named physician, his/her physician assistants or designees perform such procedures as are, in his/her professional opinion, necessary and desirable. 4.  Any tissue or organs removed in the operation or other procedure may be disposed of by and at the discretion of the [facility], [facility] and [facility] AT [facility]. 5.  I understand that in the event of a medical emergency, I will be transported by local paramedics to [facility] or other hospital emergency department. 6.  I certify that I have read and fully understand the above consent to operation and/or other procedure. 7.  I acknowledge that my physician has explained sedation/analgesia administration to me including the risks and benefits. I consent to the administration of sedation/analgesia as may be necessary or desirable in the judgement of my physician.     Witness signature: ___________________________________________________ Date: ______/______/_____                    Time:  ________ A. M.  P.M. Patient Name:  ______________________________________________________  (please print)      Patient signature:  ___________________________________________________             Relationship to Patient:           []  Parent    Responsible person                          []  Spouse  In case of minor or                    [] Other  _____________   Incompetent name:  __________________________________________________                               (please print)      _____________      Responsible person  In case of minor or  Incompetent signature:  _______________________________________________    Statement of Physician  My signature below affirms that prior to the time of the procedure, I have explained to the patient and/or his/her guardian, the risks and benefits involved in the proposed treatment and any reasonable alternative to the proposed treatment. I have also explained the risks and benefits involved in the refusal of the proposed treatment and have answered the patient's questions.                         Date:  ______/______/_______  Provider                      Signature:  __________________________________________________________       Time:  ___________ A.M    P.M.

## (undated) NOTE — LETTER
12/4/2024          Estelle Dubose    [address] [place] [address] [address] [facility] [address]         Dear Estelle,    Our records indicate that the tests ordered for you by Abel Sewell MD  have not been done.  If you have, in fact, already completed the tests or you do not wish to have the tests done, please contact our office at THE NUMBER LISTED BELOW.  Otherwise, please proceed with the testing.  Enclosed is a duplicate order for your convenience.    Hepatic Function Panel (7) (Order #[id number]) on 10/9/24       Sincerely,    Abel Sewell MD  [facility]  [address] [facility] [address]  [facility] [id number] [phone]

## (undated) NOTE — LETTER
05/20/22        Governor Fearing  [address] [place] [address]      Dear Ginna Arteaga,    [address] [facility] records indicate that you have outstanding lab work and or testing that was ordered for you and has not yet been completed:    US LIVER   Please call and schedule an appointment for above test: [phone]    To provide you with the best possible care, please complete these orders at your earliest convenience. If you have recently completed these orders please disregard this letter. If you have any questions please call the office at [phone].      Thank you,     The Staff at [facility] MD

## (undated) NOTE — LETTER
[place]  [address] [facility] [address], [place]       11/15/18        Patient: Franky Valencia   YOB: 1955   Date of Visit: 11/15/2018       Dear  Dr. Martha Rabago MD,      Thank you for referring Franky Valencia to my practice.

## (undated) NOTE — LETTER
[facility]  Administration of Anesthesia  I, Estelle Dubose agree to be cared for by a physician anesthesiologist alone and/or with a nurse anesthetist, who is specially trained to monitor me and give me medicine to put me to sleep or keep me comfortable during my procedure    I understand that my anesthesiologist and/or anesthetist is not an employee or agent of [facility] or [employer]. He or she works for [facility]    As the patient asking for anesthesia services, I agree to:  Allow the anesthesiologist (anesthesia doctor) to give me medicine and do additional procedures as necessary. Some examples are: Starting or using an “IV” to give me medicine, fluids or blood during my procedure, and having a breathing tube placed to help me breathe when I’m asleep (intubation). In the event that my heart stops working properly, I understand that my anesthesiologist will make every effort to sustain my life, unless otherwise directed by [facility] Do Not Resuscitate documents.  Tell my anesthesia doctor before my procedure:  If I am pregnant.  The last time that I ate or drank.  iii. All of the medicines I take (including prescriptions, herbal supplements, and pills I can buy without a prescription (including street drugs/illegal medications). Failure to inform my anesthesiologist about these medicines may increase my risk of anesthetic complications.  iv.If I am allergic to anything or have had a reaction to anesthesia before.  I understand how the anesthesia medicine will help me (benefits).  I understand that with any type of anesthesia medicine there are risks:  The most common risks are: nausea, vomiting, sore throat, muscle soreness, damage to my eyes, mouth, or teeth (from breathing tube placement).  Rare risks include: remembering what happened during my procedure, allergic reactions to medications, injury to my airway, heart, lungs, vision, nerves, or  muscles and in extremely rare instances death.  My doctor has explained to me other choices available to me for my care (alternatives).  Pregnant Patients (“epidural”):  I understand that the risks of having an epidural (medicine given into my back to help control pain during labor), include itching, low blood pressure, difficulty urinating, headache or slowing of the baby’s heart. Very rare risks include infection, bleeding, seizure, irregular heart rhythms and nerve injury.  Regional Anesthesia (“spinal”, “epidural”, & “nerve blocks”):  I understand that rare but potential complications include headache, bleeding, infection, seizure, irregular heart rhythms, and nerve injury.    _____________________________________________________________________________  Patient (or Representative) Signature/Relationship to Patient  Date   Time    _____________________________________________________________________________   Name (if used)    Language/Organization   Time    _____________________________________________________________________________  Nurse Anesthetist Signature     Date   Time  _____________________________________________________________________________  Anesthesiologist Signature     Date   Time  I have discussed the procedure and information above with the patient (or patient’s representative) and answered their questions. The patient or their representative has agreed to have anesthesia services.    _____________________________________________________________________________  Witness        Date   Time  I have verified that the signature is that of the patient or patient’s representative, and that it was signed before the procedure  Patient Name: Estelle Dubose     : 1955                 Printed: 2025 at 1:33 PM    Medical Record #: [id number]                                            Page 1 of 1  ----------ANESTHESIA CONSENT----------

## (undated) NOTE — LETTER
[facility], [facility][1], [place]  [address] [facility], [address] [place] [address]  Dept: [phone]    7/2/2018        Ruben Peer        [address] [facility]             Dear Abad Cedeno

## (undated) NOTE — LETTER
10/6/2021              Alverta Dural        [address] [facility]         Dear Werner Boone,    This letter is to inform you that you are due for a repeat Hepatic Function Panel October 2021 per Dr. Jack Cardenas.   An order was already debra

## (undated) NOTE — LETTER
5/28/2024              Estelle Dubose        [address] [place] [address] [address] [facility] [address]         Dear Estelle,    Our records indicate that the tests ordered for you by Abel Sewell MD  have not been done.  If you have, in fact, already completed the tests or you do not wish to have the tests done, please contact our office at THE NUMBER LISTED BELOW.  Otherwise, please proceed with the testing.  Enclosed is a duplicate order for your convenience.  Imaging Order:    US LIVER (CPT=76705) (Order #[id number]) on 10/18/23     To schedule  this test call Central Scheduling at [phone], Monday through Friday between 7:30am to 6pm and on Saturday between 8am and 1pm.   Evening and weekend appointments for your exam are available.         Sincerely,    Abel Sewell MD  [facility]  [address] [facility] [address]  [facility] [id number] [phone]

## (undated) NOTE — LETTER
6/18/2025          Estelle Dubose    [address]    [facility] [address]         Dear Estelle,    Our records indicate that the tests ordered for you by Abel Sewell MD  have not been done.  If you have, in fact, already completed the tests or you do not wish to have the tests done, please contact our office at THE NUMBER LISTED BELOW.  Otherwise, please proceed with the testing.  Enclosed is a duplicate order for your convenience.   Imaging order   Orders Placed  on 11/19/2024  US LIVER (CPT=76705)   advise we continue to monitor liver and repeat in 6 months time.     To schedule a test at any [facility] Facility, call Central Scheduling at  [phone] or [phone], Monday through Friday between 7:30am to 6pm and on Saturday between 8am and 1pm.   Evening and weekend appointments for your exam are available.Please schedule your test order through my Chart as for your preference.        Sincerely,    Able Sewell MD  [facility] MEDICAL [facility]  [address] [facility] [address]  [facility] [id number] [phone]

## (undated) NOTE — LETTER
12/20/2023              Matacony Karina        [address] [facility]         Dear Eileen Oliveira records indicate that the tests ordered for you by Abraham Bey MD  have not been done. Hepatic Function Panel (7) (Order #[id number]) on 10/18/23     AFP, Tumor Marker, Serum (Order #[id number]) on 10/18/23       If you have, in fact, already completed the tests or you do not wish to have the tests done, please contact our office at [address] [place] [address]. Otherwise, please proceed with the testing. Enclosed is a duplicate order for your convenience. Sincerely,    Abraham Bey MD  [facility], [address] [address] [phone]

## (undated) NOTE — MR AVS SNAPSHOT
[address] at [address] [facility] [address] [phone]               Thank you for choosing us for your health care visit with Lillian Laguna MD.  We are glad to serve you and happy to provide you with Assoc Dx:  Hypothyroidism, unspecified type [E03.9]                 Scheduling Instructions     Wednesday June 14, 2017     Imaging:  NAKIA SCREENING BILAT (AOX=[id number])    Instructions:   To schedule a test at any Critical access hospital, [facility]    It is the patient's responsibility to check with and follow their insurance company's guidelines for prior authorization for this test.  You may be held responsible for payment in full if [facility] [address]   Phone:  [phone]   Fax:  [phone]         Referral Orders      Normal Orders This Visit    OP REFERRAL TO Sandeep Lindo [[id number] CUSTOM]  Order #:  [id number]         **REFERRAL REQUEST**    Your phy Patient reports not IV dye used during CT angiogram/Cerebral angiograms - had both in the past without reaction. Patient reports it was a dye used in 2005 to trace the lymphatic system during a mastectomy.     Penicillins                 Today's Vital Sig Enjoy your food, but eat less. Fully enjoy your food when eating. Don’t eat while distracted and slow down. Avoid over sized portions. Don’t eat while when you’re bored.      EAT THESE FOODS MORE OFTEN: EAT THESE FOODS LESS OFTEN:   Make half your pl

## (undated) NOTE — LETTER
8/29/2024          Estelle Dubose    [address] [place] [address] [address] [facility] [address]         Dear Estelle,    Our records indicate that the tests ordered for you by Abel Sewell MD  have not been done.  If you have, in fact, already completed the tests or you do not wish to have the tests done, please contact our office at THE NUMBER LISTED BELOW.  Otherwise, please proceed with the testing.  Enclosed is a duplicate order for your convenience.    Labs Order:    AFP, Tumor Marker, Serum  CBC W Differential W Platelet  Comp Metabolic Panel (14)      Sincerely,    Abel Sewell MD  [facility]  [address] [facility] [address]  [facility] [address] [phone]